# Patient Record
Sex: MALE | Race: BLACK OR AFRICAN AMERICAN | NOT HISPANIC OR LATINO | Employment: FULL TIME | URBAN - METROPOLITAN AREA
[De-identification: names, ages, dates, MRNs, and addresses within clinical notes are randomized per-mention and may not be internally consistent; named-entity substitution may affect disease eponyms.]

---

## 2020-02-11 ENCOUNTER — HOSPITAL ENCOUNTER (EMERGENCY)
Facility: HOSPITAL | Age: 40
Discharge: HOME | End: 2020-02-11
Attending: EMERGENCY MEDICINE | Admitting: EMERGENCY MEDICINE

## 2020-02-11 VITALS
DIASTOLIC BLOOD PRESSURE: 78 MMHG | HEIGHT: 68 IN | RESPIRATION RATE: 16 BRPM | TEMPERATURE: 98.7 F | HEART RATE: 100 BPM | OXYGEN SATURATION: 98 % | BODY MASS INDEX: 45.77 KG/M2 | WEIGHT: 302 LBS | SYSTOLIC BLOOD PRESSURE: 151 MMHG

## 2020-02-11 DIAGNOSIS — B02.9 HERPES ZOSTER WITHOUT COMPLICATION: Primary | ICD-10-CM

## 2020-02-11 PROCEDURE — 99282 EMERGENCY DEPT VISIT SF MDM: CPT

## 2020-02-11 RX ORDER — FAMCICLOVIR 500 MG/1
500 TABLET ORAL 3 TIMES DAILY
Qty: 21 TABLET | Refills: 0 | Status: SHIPPED | OUTPATIENT
Start: 2020-02-11 | End: 2021-01-05

## 2020-02-11 RX ORDER — PREDNISONE 20 MG/1
TABLET ORAL
Qty: 10 TABLET | Refills: 0 | Status: SHIPPED | OUTPATIENT
Start: 2020-02-11 | End: 2021-01-05

## 2020-02-11 RX ORDER — GABAPENTIN 300 MG/1
300 CAPSULE ORAL 3 TIMES DAILY
Qty: 30 CAPSULE | Refills: 0 | Status: SHIPPED | OUTPATIENT
Start: 2020-02-11 | End: 2021-01-05

## 2020-02-12 NOTE — ED PROVIDER NOTES
Subjective   Jose Hardin is a 39 y.o. male who presents to the ED with complaints of a rash for the past 2 days. He states that he noticed the rash on his left upper leg 2 days ago and it then spread up his side to his left flank and back. Mr. Hardin reports that a couple days before the rash appeared he had some leg and back pain in the same area as the rash. He states that movement makes the pain worse. He describes the rash as red, blistering, and painful. He describes the pain as a stinging sensation. He denies any associated fever, shortness of breath, or headache. Mr. Hardin denies any significant past medical history. He does not have a PCP. There are no other acute complaints at this time.      History provided by:  Patient  Rash   Location:  Leg and torso  Torso rash location:  Lower back  Leg rash location:  L upper leg  Quality: blistering, painful and redness    Pain details:     Quality:  Stinging    Severity:  Moderate    Onset quality:  Gradual    Duration:  2 days    Timing:  Constant    Progression:  Worsening  Severity:  Moderate  Onset quality:  Gradual  Duration:  2 days  Timing:  Constant  Progression:  Spreading  Chronicity:  New  Relieved by:  None tried  Worsened by:  Nothing  Ineffective treatments:  None tried  Associated symptoms: myalgias (left upper leg)    Associated symptoms: no fever, no headaches and no shortness of breath        Review of Systems   Constitutional: Negative for fever.   Respiratory: Negative for shortness of breath.    Musculoskeletal: Positive for back pain (low) and myalgias (left upper leg).   Skin: Positive for rash.   Neurological: Negative for headaches.   All other systems reviewed and are negative.      No past medical history on file.    No Known Allergies    No past surgical history on file.    No family history on file.    Social History     Socioeconomic History   • Marital status: Single     Spouse name: Not on file   • Number of  children: Not on file   • Years of education: Not on file   • Highest education level: Not on file         Objective   Physical Exam   Constitutional: He is oriented to person, place, and time. He appears well-developed and well-nourished.   HENT:   Head: Normocephalic and atraumatic.   Nose: Nose normal.   Eyes: Conjunctivae are normal. No scleral icterus.   Neck: Normal range of motion.   Pulmonary/Chest: Effort normal. No respiratory distress.   Musculoskeletal: Normal range of motion. He exhibits no deformity.   Neurological: He is alert and oriented to person, place, and time.   Skin: Skin is warm and dry. Rash noted.   There is a blistering red rash that almost completely fills the dermatome over his left thigh extending at the midline over his upper buttock. L3-L4 dermatome.   Psychiatric: He has a normal mood and affect. His behavior is normal.   Nursing note and vitals reviewed.      Procedures         ED Course  ED Course as of Feb 12 0152   Tue Feb 11, 2020 2012 I suspect the patient has shingles. I informed Mr. Hardin on treatment options and provided him discharge and follow up instructions. -DBT    [NP]   2015 NIKI query complete. Treatment plan to include limited course of prescribed  controlled substance. Risks including addiction, benefits, and alternatives presented to patient.     Reference: [id number]     [NP]   2016 I spoke with Mr. Garland about diagnosis.  I spoke with him about the possibility this could indicate immunocompromise possibly from HIV and advised him that he must find a primary care provider for further evaluation and treatment of this.  I talked to him about use of gabapentin.  I spoke with him about precautions such as avoiding exposure to pregnant or immunocompromised people.  He works from home so should be able to continue working    [DT]      ED Course User Index  [DT] Tae Aponte MD  [NP] Fatou Garcia     No results found for this or any previous visit  "(from the past 24 hour(s)).  Note: In addition to lab results from this visit, the labs listed above may include labs taken at another facility or during a different encounter within the last 24 hours. Please correlate lab times with ED admission and discharge times for further clarification of the services performed during this visit.    No orders to display     Vitals:    02/11/20 1716   BP: 151/78   BP Location: Left arm   Patient Position: Sitting   Pulse: 100   Resp: 16   Temp: 98.7 °F (37.1 °C)   TempSrc: Oral   SpO2: 98%   Weight: (!) 137 kg (302 lb)   Height: 172.7 cm (68\")     Medications - No data to display  ECG/EMG Results (last 24 hours)     ** No results found for the last 24 hours. **        No orders to display                       [place] Coma Scale Score: 15                            MDM    Final diagnoses:   Herpes zoster without complication       Documentation assistance provided by mike Garcia.  Information recorded by the scribe was done at my direction and has been verified and validated by me.     Fatou Garcia  02/11/20 2019       Tae Aponte MD  02/12/20 0152    "

## 2020-02-12 NOTE — DISCHARGE INSTRUCTIONS
Do not drive when you begin taking the gabapentin as it may make you sleepy.  Choose a primary care provider and arrange close follow-up with them for further evaluation.  Return if you have severe headache, shortness of breath, or any other concerns.  You may try using Zostrix which is available over-the-counter.  Monitor your blood pressure and report it to your primary care provider upon follow-up.

## 2021-01-05 ENCOUNTER — OFFICE VISIT (OUTPATIENT)
Dept: FAMILY MEDICINE CLINIC | Facility: CLINIC | Age: 41
End: 2021-01-05

## 2021-01-05 ENCOUNTER — LAB (OUTPATIENT)
Dept: LAB | Facility: HOSPITAL | Age: 41
End: 2021-01-05

## 2021-01-05 VITALS
HEIGHT: 68 IN | WEIGHT: 315 LBS | OXYGEN SATURATION: 99 % | SYSTOLIC BLOOD PRESSURE: 126 MMHG | HEART RATE: 80 BPM | BODY MASS INDEX: 47.74 KG/M2 | DIASTOLIC BLOOD PRESSURE: 84 MMHG

## 2021-01-05 DIAGNOSIS — Z00.00 PREVENTATIVE HEALTH CARE: ICD-10-CM

## 2021-01-05 DIAGNOSIS — G47.419 PRIMARY NARCOLEPSY WITHOUT CATAPLEXY: ICD-10-CM

## 2021-01-05 DIAGNOSIS — Z00.00 PREVENTATIVE HEALTH CARE: Primary | ICD-10-CM

## 2021-01-05 DIAGNOSIS — G47.33 OBSTRUCTIVE SLEEP APNEA: ICD-10-CM

## 2021-01-05 DIAGNOSIS — G40.909 SEIZURE DISORDER (HCC): ICD-10-CM

## 2021-01-05 LAB
BILIRUB UR QL STRIP: ABNORMAL
CLARITY UR: CLEAR
COLOR UR: ABNORMAL
GLUCOSE UR STRIP-MCNC: NEGATIVE MG/DL
HGB UR QL STRIP.AUTO: NEGATIVE
KETONES UR QL STRIP: ABNORMAL
LEUKOCYTE ESTERASE UR QL STRIP.AUTO: ABNORMAL
NITRITE UR QL STRIP: NEGATIVE
PH UR STRIP.AUTO: 7.5 [PH] (ref 5–8)
PROT UR QL STRIP: NEGATIVE
SP GR UR STRIP: 1.02 (ref 1–1.03)
UROBILINOGEN UR QL STRIP: ABNORMAL

## 2021-01-05 PROCEDURE — 99385 PREV VISIT NEW AGE 18-39: CPT | Performed by: INTERNAL MEDICINE

## 2021-01-05 PROCEDURE — 81001 URINALYSIS AUTO W/SCOPE: CPT

## 2021-01-05 PROCEDURE — 90686 IIV4 VACC NO PRSV 0.5 ML IM: CPT | Performed by: INTERNAL MEDICINE

## 2021-01-05 PROCEDURE — 80053 COMPREHEN METABOLIC PANEL: CPT

## 2021-01-05 PROCEDURE — 84439 ASSAY OF FREE THYROXINE: CPT

## 2021-01-05 PROCEDURE — 82306 VITAMIN D 25 HYDROXY: CPT

## 2021-01-05 PROCEDURE — 85025 COMPLETE CBC W/AUTO DIFF WBC: CPT

## 2021-01-05 PROCEDURE — 90471 IMMUNIZATION ADMIN: CPT | Performed by: INTERNAL MEDICINE

## 2021-01-05 PROCEDURE — 83036 HEMOGLOBIN GLYCOSYLATED A1C: CPT

## 2021-01-05 PROCEDURE — 80061 LIPID PANEL: CPT

## 2021-01-05 PROCEDURE — 84443 ASSAY THYROID STIM HORMONE: CPT

## 2021-01-05 NOTE — PROGRESS NOTES
Chief Complaint   Patient presents with   • Annual Exam     pt states he has had seizures on and off for 2 years but has been having one once every 2 weeks for the past 3 months   • Establish Care     Flu shot in office today.        HPI:  Jose Hardin is a 40 y.o. male who presents today for establish care and annual physical.  His main concern today is worsening seizure episodes.  He is seen several neurologists in the [place] area.  He has altered mental status states that usually occur in the morning lasting for several minutes at a time.  Patient reports he zones out, mostly talking gibberish, then returns to his normal self a few minutes later.  Most episodes occur after waking from either a nap or in the morning.  He has had one episode that occurred during a lecture in the day.    ROS:  Constitutional: no fevers, night sweats or unexplained weight loss  Eyes: no vision changes  ENT: no runny nose, ear pain, sore throat  Cardio: no chest pain, palpitations  Pulm: no shortness of breath, wheezing, or cough  GI: no abdominal pain or changes in bowel movements  : no difficulty urinating  MSK: no difficulty ambulating, no joint pain  Neuro: no weakness, dizziness or headache  Psych: no trouble sleeping  Endo: no change in appetite      Past Medical History:   Diagnosis Date   • Anxiety    • Seizures (CMS/HCC)     having seizures once every two weeks x 2 years      Family History   Problem Relation Age of Onset   • Hypertension Mother    • Mental illness Mother    • Kidney disease Mother    • Hypertension Father    • Kidney disease Father    • Cancer Father    • Heart disease Paternal Grandfather       Social History     Socioeconomic History   • Marital status: Single     Spouse name: Not on file   • Number of children: Not on file   • Years of education: Not on file   • Highest education level: Not on file   Tobacco Use   • Smoking status: Never Smoker   • Smokeless tobacco: Never Used   Substance and  Sexual Activity   • Alcohol use: Yes     Frequency: 2-3 times a week     Drinks per session: 1 or 2     Binge frequency: Never   • Drug use: Never   • Sexual activity: Defer      No Known Allergies   Immunization History   Administered Date(s) Administered   • Flulaval/Fluarix/Fluzone Quad 01/05/2021   • Tdap 09/02/2020        PE:  Vitals:    01/05/21 0930   BP: 126/84   Pulse: 80   SpO2: 99%      Body mass index is 48.17 kg/m².    Gen Appearance: NAD  HEENT: Normocephalic, PERRLA, no thyromegaly, trache midline  Heart: RRR, normal S1 and S2, no murmur  Lungs: CTA b/l, no wheezing, no crackles  Abdomen: Soft, non-tender, non-distended, no guarding and BSx4  MSK: Moves all extremities well, normal gait, no peripheral edema  Pulses: Palpable and equal b/l  Lymph nodes: No palpable lymphadenopathy   Neuro: No focal deficits      No current outpatient medications on file.     No current facility-administered medications for this visit.         Diagnoses and all orders for this visit:    1. Preventative health care (Primary)  -     CBC & Differential; Future  -     Comprehensive Metabolic Panel; Future  -     Hemoglobin A1c; Future  -     Lipid Panel; Future  -     TSH+Free T4; Future  -     Urinalysis With Culture If Indicated - Urine, Clean Catch; Future  -     Vitamin D 25 Hydroxy; Future  Counseled on healthy weight, nutrition, physical activity, cancer screening, and immunizations.  Recommend checking screening blood work.  Discussed obesity.  Referring to neurology for second opinion as well as sleep medicine to rule out narcolepsy.    2. Seizure disorder (CMS/HCC)    3. Primary narcolepsy without cataplexy    4. Obstructive sleep apnea    Other orders  -     Fluarix Quad >6 Months ([id number])         Return in about 1 year (around 1/5/2022) for Annual.     Please note that portions of this document were completed with a voice recognition program. Efforts were made to edit the dictations, but occasionally words are  mis-transcribed.

## 2021-01-06 LAB
25(OH)D3 SERPL-MCNC: 32.8 NG/ML (ref 30–100)
ALBUMIN SERPL-MCNC: 4.5 G/DL (ref 3.5–5.2)
ALBUMIN/GLOB SERPL: 1.3 G/DL
ALP SERPL-CCNC: 41 U/L (ref 39–117)
ALT SERPL W P-5'-P-CCNC: 19 U/L (ref 1–41)
ANION GAP SERPL CALCULATED.3IONS-SCNC: 8.5 MMOL/L (ref 5–15)
AST SERPL-CCNC: 29 U/L (ref 1–40)
BACTERIA UR QL AUTO: ABNORMAL /HPF
BASOPHILS # BLD AUTO: 0.05 10*3/MM3 (ref 0–0.2)
BASOPHILS NFR BLD AUTO: 1.5 % (ref 0–1.5)
BILIRUB SERPL-MCNC: 0.9 MG/DL (ref 0–1.2)
BUN SERPL-MCNC: 9 MG/DL (ref 6–20)
BUN/CREAT SERPL: 11.5 (ref 7–25)
CALCIUM SPEC-SCNC: 9.5 MG/DL (ref 8.6–10.5)
CHLORIDE SERPL-SCNC: 100 MMOL/L (ref 98–107)
CHOLEST SERPL-MCNC: 166 MG/DL (ref 0–200)
CO2 SERPL-SCNC: 28.5 MMOL/L (ref 22–29)
CREAT SERPL-MCNC: 0.78 MG/DL (ref 0.76–1.27)
DEPRECATED RDW RBC AUTO: 39.6 FL (ref 37–54)
EOSINOPHIL # BLD AUTO: 0.08 10*3/MM3 (ref 0–0.4)
EOSINOPHIL NFR BLD AUTO: 2.4 % (ref 0.3–6.2)
ERYTHROCYTE [DISTWIDTH] IN BLOOD BY AUTOMATED COUNT: 12 % (ref 12.3–15.4)
GFR SERPL CREATININE-BSD FRML MDRD: 135 ML/MIN/1.73
GLOBULIN UR ELPH-MCNC: 3.5 GM/DL
GLUCOSE SERPL-MCNC: 88 MG/DL (ref 65–99)
HBA1C MFR BLD: 4.6 % (ref 4.8–5.6)
HCT VFR BLD AUTO: 42.9 % (ref 37.5–51)
HDLC SERPL-MCNC: 85 MG/DL (ref 40–60)
HGB BLD-MCNC: 14.4 G/DL (ref 13–17.7)
HYALINE CASTS UR QL AUTO: ABNORMAL /LPF
IMM GRANULOCYTES # BLD AUTO: 0 10*3/MM3 (ref 0–0.05)
IMM GRANULOCYTES NFR BLD AUTO: 0 % (ref 0–0.5)
LDLC SERPL CALC-MCNC: 72 MG/DL (ref 0–100)
LDLC/HDLC SERPL: 0.86 {RATIO}
LYMPHOCYTES # BLD AUTO: 1.48 10*3/MM3 (ref 0.7–3.1)
LYMPHOCYTES NFR BLD AUTO: 44.8 % (ref 19.6–45.3)
MCH RBC QN AUTO: 30.6 PG (ref 26.6–33)
MCHC RBC AUTO-ENTMCNC: 33.6 G/DL (ref 31.5–35.7)
MCV RBC AUTO: 91.3 FL (ref 79–97)
MONOCYTES # BLD AUTO: 0.41 10*3/MM3 (ref 0.1–0.9)
MONOCYTES NFR BLD AUTO: 12.4 % (ref 5–12)
NEUTROPHILS NFR BLD AUTO: 1.28 10*3/MM3 (ref 1.7–7)
NEUTROPHILS NFR BLD AUTO: 38.9 % (ref 42.7–76)
NRBC BLD AUTO-RTO: 0 /100 WBC (ref 0–0.2)
PLATELET # BLD AUTO: 253 10*3/MM3 (ref 140–450)
PMV BLD AUTO: 9.6 FL (ref 6–12)
POTASSIUM SERPL-SCNC: 4.2 MMOL/L (ref 3.5–5.2)
PROT SERPL-MCNC: 8 G/DL (ref 6–8.5)
RBC # BLD AUTO: 4.7 10*6/MM3 (ref 4.14–5.8)
RBC # UR: ABNORMAL /HPF
REF LAB TEST METHOD: ABNORMAL
SODIUM SERPL-SCNC: 137 MMOL/L (ref 136–145)
SQUAMOUS #/AREA URNS HPF: ABNORMAL /HPF
T4 FREE SERPL-MCNC: 1.23 NG/DL (ref 0.93–1.7)
TRIGL SERPL-MCNC: 38 MG/DL (ref 0–150)
TSH SERPL DL<=0.05 MIU/L-ACNC: 1.64 UIU/ML (ref 0.27–4.2)
VLDLC SERPL-MCNC: 9 MG/DL (ref 5–40)
WBC # BLD AUTO: 3.3 10*3/MM3 (ref 3.4–10.8)
WBC UR QL AUTO: ABNORMAL /HPF

## 2021-01-26 DIAGNOSIS — Z11.3 SCREENING EXAMINATION FOR STD (SEXUALLY TRANSMITTED DISEASE): ICD-10-CM

## 2021-01-26 DIAGNOSIS — Z20.2 TRICHOMONAS EXPOSURE: Primary | ICD-10-CM

## 2021-01-26 RX ORDER — METRONIDAZOLE 500 MG/1
500 TABLET ORAL 2 TIMES DAILY
Qty: 14 TABLET | Refills: 0 | Status: SHIPPED | OUTPATIENT
Start: 2021-01-26 | End: 2021-02-02

## 2021-02-01 DIAGNOSIS — Z11.3 SCREENING EXAMINATION FOR STD (SEXUALLY TRANSMITTED DISEASE): ICD-10-CM

## 2021-12-09 ENCOUNTER — OFFICE VISIT (OUTPATIENT)
Dept: FAMILY MEDICINE CLINIC | Facility: CLINIC | Age: 41
End: 2021-12-09

## 2021-12-09 ENCOUNTER — HOSPITAL ENCOUNTER (OUTPATIENT)
Dept: GENERAL RADIOLOGY | Facility: HOSPITAL | Age: 41
Discharge: HOME | End: 2021-12-09
Admitting: INTERNAL MEDICINE

## 2021-12-09 VITALS
SYSTOLIC BLOOD PRESSURE: 120 MMHG | DIASTOLIC BLOOD PRESSURE: 78 MMHG | WEIGHT: 315 LBS | HEIGHT: 68 IN | BODY MASS INDEX: 47.74 KG/M2 | OXYGEN SATURATION: 97 % | HEART RATE: 87 BPM

## 2021-12-09 DIAGNOSIS — M25.562 CHRONIC PAIN OF LEFT KNEE: Primary | ICD-10-CM

## 2021-12-09 DIAGNOSIS — E66.01 CLASS 3 SEVERE OBESITY DUE TO EXCESS CALORIES WITHOUT SERIOUS COMORBIDITY WITH BODY MASS INDEX (BMI) OF 45.0 TO 49.9 IN ADULT (HCC): ICD-10-CM

## 2021-12-09 DIAGNOSIS — M25.562 CHRONIC PAIN OF LEFT KNEE: ICD-10-CM

## 2021-12-09 DIAGNOSIS — G89.29 CHRONIC PAIN OF LEFT KNEE: ICD-10-CM

## 2021-12-09 DIAGNOSIS — G89.29 CHRONIC PAIN OF LEFT KNEE: Primary | ICD-10-CM

## 2021-12-09 PROCEDURE — 73560 X-RAY EXAM OF KNEE 1 OR 2: CPT

## 2021-12-09 PROCEDURE — 99214 OFFICE O/P EST MOD 30 MIN: CPT | Performed by: INTERNAL MEDICINE

## 2021-12-09 RX ORDER — OXCARBAZEPINE 300 MG
1200 TABLET ORAL 2 TIMES DAILY
COMMUNITY
Start: 2021-10-17 | End: 2023-01-10

## 2021-12-10 PROBLEM — G89.29 CHRONIC PAIN OF LEFT KNEE: Status: ACTIVE | Noted: 2021-12-10

## 2021-12-10 PROBLEM — E66.813 CLASS 3 SEVERE OBESITY DUE TO EXCESS CALORIES WITHOUT SERIOUS COMORBIDITY WITH BODY MASS INDEX (BMI) OF 45.0 TO 49.9 IN ADULT: Status: ACTIVE | Noted: 2021-12-10

## 2021-12-10 PROBLEM — E66.01 CLASS 3 SEVERE OBESITY DUE TO EXCESS CALORIES WITHOUT SERIOUS COMORBIDITY WITH BODY MASS INDEX (BMI) OF 45.0 TO 49.9 IN ADULT (HCC): Status: ACTIVE | Noted: 2021-12-10

## 2021-12-10 PROBLEM — M25.562 CHRONIC PAIN OF LEFT KNEE: Status: ACTIVE | Noted: 2021-12-10

## 2021-12-10 NOTE — PROGRESS NOTES
Chief Complaint   Patient presents with   • Knee Pain     left side. x 3 months and worsening - no injury noted. hx of football injuries.        HPI:  Jose Hardin is a 41 y.o. male who presents today for acute on chronic left knee pain.  Worsening over the last 3 months.  No identifiable trauma or injury.  He has a history of several injuries while playing football.    ROS:  Constitutional: no fevers, night sweats or unexplained weight loss  Eyes: no vision changes  ENT: no runny nose, ear pain, sore throat  Cardio: no chest pain, palpitations  Pulm: no shortness of breath, wheezing, or cough  GI: no abdominal pain or changes in bowel movements  : no difficulty urinating  MSK: no difficulty ambulating, + joint pain  Neuro: no weakness, dizziness or headache  Psych: no trouble sleeping  Endo: no change in appetite      Past Medical History:   Diagnosis Date   • Anxiety    • Seizures (HCC)     having seizures once every two weeks x 2 years      Family History   Problem Relation Age of Onset   • Hypertension Mother    • Mental illness Mother    • Kidney disease Mother    • Hypertension Father    • Kidney disease Father    • Cancer Father    • Heart disease Paternal Grandfather       Social History     Socioeconomic History   • Marital status: Single   Tobacco Use   • Smoking status: Never Smoker   • Smokeless tobacco: Never Used   Substance and Sexual Activity   • Alcohol use: Yes   • Drug use: Never   • Sexual activity: Defer      No Known Allergies   Immunization History   Administered Date(s) Administered   • COVID-19 (PFIZER) 03/25/2021, 04/16/2021   • FluLaval/Fluarix/Fluzone >6 01/05/2021   • Tdap 09/02/2020        PE:  Vitals:    12/09/21 1151   BP: 120/78   Pulse: 87   SpO2: 97%      Body mass index is 48.35 kg/m².    Gen Appearance: NAD  HEENT: Normocephalic, PERRLA, no thyromegaly, trache midline  Heart: RRR, normal S1 and S2, no murmur  Lungs: CTA b/l, no wheezing, no crackles  Abdomen: Soft,  non-tender, non-distended, no guarding and BSx4  MSK: Moves all extremities well, normal gait, no peripheral edema  Pulses: Palpable and equal b/l  Lymph nodes: No palpable lymphadenopathy   Neuro: No focal deficits      Current Outpatient Medications   Medication Sig Dispense Refill   • OXcarbazepine (Trileptal) 300 MG tablet      • Diclofenac Sodium (VOLTAREN) 1 % gel gel Apply 4 g topically to the appropriate area as directed 4 (Four) Times a Day As Needed (knee pain). 100 g 1     No current facility-administered medications for this visit.        Diagnoses and all orders for this visit:    1. Chronic pain of left knee (Primary)  -     XR Knee 1 or 2 View Left; Future  -     Diclofenac Sodium (VOLTAREN) 1 % gel gel; Apply 4 g topically to the appropriate area as directed 4 (Four) Times a Day As Needed (knee pain).  Dispense: 100 g; Refill: 1  -     Ambulatory Referral to Orthopedic Surgery  Medial knee pain on the left.  Voltaren gel as needed.  Recommend establishing care with orthopedics.  Checking x-ray today.  2. Class 3 severe obesity due to excess calories without serious comorbidity with body mass index (BMI) of 45.0 to 49.9 in adult (HCC)  Encouraged weight loss.       No follow-ups on file.     Dictated Utilizing Dragon Dictation    Please note that portions of this note were completed with a voice recognition program.    Part of this note may be an electronic transcription/translation of spoken language to printed text using the Dragon Dictation System.

## 2021-12-23 ENCOUNTER — OFFICE VISIT (OUTPATIENT)
Dept: ORTHOPEDIC SURGERY | Facility: CLINIC | Age: 41
End: 2021-12-23

## 2021-12-23 VITALS
SYSTOLIC BLOOD PRESSURE: 132 MMHG | HEIGHT: 68 IN | DIASTOLIC BLOOD PRESSURE: 92 MMHG | WEIGHT: 315 LBS | BODY MASS INDEX: 47.74 KG/M2

## 2021-12-23 DIAGNOSIS — M25.562 CHRONIC PAIN OF LEFT KNEE: Primary | ICD-10-CM

## 2021-12-23 DIAGNOSIS — S84.12XS INJURY OF LEFT PERONEAL NERVE, SEQUELA: ICD-10-CM

## 2021-12-23 DIAGNOSIS — M17.12 PRIMARY OSTEOARTHRITIS OF LEFT KNEE: ICD-10-CM

## 2021-12-23 DIAGNOSIS — E66.01 CLASS 3 SEVERE OBESITY DUE TO EXCESS CALORIES WITHOUT SERIOUS COMORBIDITY WITH BODY MASS INDEX (BMI) OF 45.0 TO 49.9 IN ADULT (HCC): ICD-10-CM

## 2021-12-23 DIAGNOSIS — G89.29 CHRONIC PAIN OF LEFT KNEE: Primary | ICD-10-CM

## 2021-12-23 PROCEDURE — 99204 OFFICE O/P NEW MOD 45 MIN: CPT | Performed by: ORTHOPAEDIC SURGERY

## 2021-12-23 NOTE — PROGRESS NOTES
[facility] [place] [facility] Note        Subjective     Pain of the Left Knee      HPI    Jose Hardin is a 41 y.o. male who presents with new problem of: left knee pain.  Onset: atraumatic and gradual in nature. The issue has been ongoing for 3 month(s). Pain is a 7/10 on the pain scale. Pain is described as aching and throbbing. Associated symptoms include pain, swelling and giving way/buckling. The pain is worse with walking, standing, sitting, climbing stairs, sleeping, working and leisure; pain medication and/or NSAID improve the pain. Previous treatments have included: bracing and weight loss.    I have reviewed the following portions of the patient's history:History of Present Illness and review of systems.         Patient is here today at the request of Dr. Coates for evaluation of 3 months of worsening left knee pain.  Patient had a dog bite posterior laterally knee was initially concerned about infection.  He has pain anterior medially and occasional numbness and tingling in his foot..  Patient also has family history of arthritis in the knee.    Past Medical History:   Diagnosis Date   • Acromioclavicular separation Roughly 20 years ago    High school football injury   • Anxiety    • Knee sprain Over 20 years ago    Old injury   • Knee swelling Early Dec.    Recent X-Ray for this visit   • Seizures (HCC)     having seizures once every two weeks x 2 years      History reviewed. No pertinent surgical history.   Family History   Problem Relation Age of Onset   • Hypertension Mother    • Mental illness Mother    • Kidney disease Mother    • Hypertension Father    • Kidney disease Father    • Cancer Father    • Heart disease Paternal Grandfather      Social History     Socioeconomic History   • Marital status: Single   Tobacco Use   • Smoking status: Never Smoker   • Smokeless tobacco: Never Used   Substance and Sexual Activity   • Alcohol use: Yes     Alcohol/week: 4.0 standard drinks     Types:  "2 Cans of beer, 2 Standard drinks or equivalent per week   • Drug use: Never   • Sexual activity: Yes     Partners: Female     Birth control/protection: None      Current Outpatient Medications on File Prior to Visit   Medication Sig Dispense Refill   • Diclofenac Sodium (VOLTAREN) 1 % gel gel Apply 4 g topically to the appropriate area as directed 4 (Four) Times a Day As Needed (knee pain). 100 g 1   • OXcarbazepine (Trileptal) 300 MG tablet        No current facility-administered medications on file prior to visit.      No Known Allergies       Review of Systems   Constitutional: Negative.    HENT: Negative.    Eyes: Negative.    Respiratory: Negative.    Cardiovascular: Negative.    Gastrointestinal: Negative.    Endocrine: Negative.    Genitourinary: Negative.    Musculoskeletal: Positive for arthralgias.   Skin: Negative.    Allergic/Immunologic: Negative.    Neurological: Negative.    Hematological: Negative.    Psychiatric/Behavioral: Negative.         I reviewed the patient's chief complaint, history of present illness, review of systems, past medical history, surgical history, family history, social history, medications and allergy list.        Objective      Physical Exam  /92   Ht 172.7 cm (67.99\")   Wt (!) 144 kg (317 lb 7.4 oz)   BMI 48.28 kg/m²     Body mass index is 48.28 kg/m².    General  Mental Status - alert  General Appearance - cooperative, well groomed, not in acute distress  Orientation - Oriented X3  Build & Nutrition - well developed and well nourished  Posture - normal posture  Gait - normal gait       Ortho Exam  Peripheral Vascular:    Upper Extremity:   Inspection:  Left--no cyanotic nail beds Right--no cyanotic nail beds   Bilateral:  Pink nail beds with brisk capillary refill   Palpation:  Bilateral radial pulse normal    Musculoskeletal:  Global Assessment:  Overall assessment of Lower Extremity Muscle Strength and Tone:  Left quadriceps--5/5   Left hamstrings--5/5       Left " tibialis anterior--5/5  Left gastroc-soleus--5/5  Left EHL --5/5    Lower Extremity:  Knee/Patella:  No digital clubbing or cyanosis.    Examination of left knee reveals:  Normal deep tendon reflexes, coordination, strength, tone, sensation.  No known fractures or deformities.    Inspection and Palpation:  Left knee:  Tenderness:  Over the lateral joint line and moderate severity  Effusion:  1+  Crepitus:  Positive  Pulses:  2+  Ecchymosis:  None  Warmth:  None     ROM:  Left:  Extension:5    Flexion:120  Right:  Extension:5     Flexion:120    Instability:    Left:  Lachman Test:  Negative, Varus stress test negative, Valgus stress test negative    Deformities/Malalignments/Discrepancies:    Left:  Genu valgum    Functional Testing:  Kaleb's test:  Negative  Patella grind test:  Positive  Q-angle:  normal    Imaging/Studies  Imaging Results (Last 24 Hours)     ** No results found for the last 24 hours. **      XR Knee 1 or 2 View Left  Narrative: EXAMINATION: XR KNEE 1 OR 2 VW LEFT-      INDICATION: M25.562-Pain in left knee; G89.29-Other chronic pain.      COMPARISON: None.     FINDINGS: AP and lateral views of the left knee reveal at least moderate  DJD with joint space narrowing in the medial femorotibial compartments  and a slight tibia varus configuration of alignment with osteophytosis  and flattening of the lateral tibial plateau although no depressed or  displaced element for acute fracture is evident. No effusion.         Impression: Moderate DJD with joint space narrowing in the medial  femorotibial compartments and a slight tibia varus configuration of  alignment with osteophytosis and flattening of the lateral tibial  plateau although no depressed or displaced element for acute fracture is  evident.     D:  12/10/2021  E:  12/10/2021     This report was finalized on 12/10/2021 3:58 PM by Dr. Arnel Lomeli.     We reviewed images and report of the x-ray above.  Patient has moderate to early severe  lateral compartment degenerative changes.  More moderate patellofemoral degenerative changes.  Mild genu valgum.      Assessment    Assessment:  1. Chronic pain of left knee    2. Primary osteoarthritis of left knee    3. Class 3 severe obesity due to excess calories without serious comorbidity with body mass index (BMI) of 45.0 to 49.9 in adult (HCC)    4. Injury of left peroneal nerve, sequela        Plan:  1. Continue over-the-counter medication as needed for discomfort  2. Chronic left knee pain in a patient with lateral and patellofemoral compartment arthritis.  Patient is status post dog bite to the posterior lateral aspect of his knee which could have injured the peroneal nerve and cause some of the tingling in his foot from time to time.  The numbness and tingling could also be coming from his low back.  We will keep an eye on things.  He says he is currently okay with regards to treatment and we briefly discussed the possibility of injections down the road and weight control.  Check him back in 3 months we will see how he is doing overall.        Maikel Lam MD  12/23/21  12:40 EST      Dictated Utilizing Dragon Dictation.

## 2022-01-10 ENCOUNTER — TELEPHONE (OUTPATIENT)
Dept: ORTHOPEDIC SURGERY | Facility: CLINIC | Age: 42
End: 2022-01-10

## 2022-01-10 DIAGNOSIS — M17.12 PRIMARY OSTEOARTHRITIS OF LEFT KNEE: Primary | ICD-10-CM

## 2022-01-10 NOTE — TELEPHONE ENCOUNTER
----- Message from Jose Hardin sent at 1/8/2022 11:17 AM EST -----  Regarding: Knee/Leg Pain  Hi Dr. Hamilton,    Hope you're well. Since my initial visit with you my knee and leg (especially inner shin area) pain has been very severe where I can barely walk. I've been taking natural supplements along with Tylenol Arthritis, they're no longer doing the trick. I'm scheduled for a follow-up visit in March, but I was wondering if I can possibly receive a script for a steroid pill and if that would help the pain?     I had a colleague mention he had gout (sp) and it had similar symptoms and they received a steroid pill and that helped a great deal. Maybe I can also be scheduled for a follow-up visit prior to March for an injection, but I was hoping you can call in a steroid script in the meantime for some more immediate relief? Please let me know.    Thanks,    Will Wilfred

## 2022-01-10 NOTE — TELEPHONE ENCOUNTER
From Myrna:  I have placed a preauthorization for viscosupplementation series to the knee.  Once approved, he can follow-up with Dr. Lam to begin the series.     Sent message to patient via [employer].    Shanel Olson

## 2022-01-11 ENCOUNTER — OFFICE VISIT (OUTPATIENT)
Dept: ORTHOPEDIC SURGERY | Facility: CLINIC | Age: 42
End: 2022-01-11

## 2022-01-11 VITALS
BODY MASS INDEX: 47.74 KG/M2 | HEIGHT: 68 IN | WEIGHT: 315 LBS | DIASTOLIC BLOOD PRESSURE: 98 MMHG | SYSTOLIC BLOOD PRESSURE: 132 MMHG

## 2022-01-11 DIAGNOSIS — E66.01 CLASS 3 SEVERE OBESITY DUE TO EXCESS CALORIES WITHOUT SERIOUS COMORBIDITY WITH BODY MASS INDEX (BMI) OF 45.0 TO 49.9 IN ADULT: ICD-10-CM

## 2022-01-11 DIAGNOSIS — M17.12 PRIMARY OSTEOARTHRITIS OF LEFT KNEE: Primary | ICD-10-CM

## 2022-01-11 PROCEDURE — 99213 OFFICE O/P EST LOW 20 MIN: CPT | Performed by: PHYSICIAN ASSISTANT

## 2022-01-11 PROCEDURE — 20610 DRAIN/INJ JOINT/BURSA W/O US: CPT | Performed by: PHYSICIAN ASSISTANT

## 2022-01-11 RX ORDER — LIDOCAINE HYDROCHLORIDE 10 MG/ML
4 INJECTION, SOLUTION EPIDURAL; INFILTRATION; INTRACAUDAL; PERINEURAL
Status: COMPLETED | OUTPATIENT
Start: 2022-01-11 | End: 2022-01-11

## 2022-01-11 RX ORDER — NAPROXEN 500 MG/1
500 TABLET ORAL 2 TIMES DAILY
Qty: 60 TABLET | Refills: 2 | Status: SHIPPED | OUTPATIENT
Start: 2022-01-11 | End: 2022-03-22

## 2022-01-11 RX ORDER — TRIAMCINOLONE ACETONIDE 40 MG/ML
40 INJECTION, SUSPENSION INTRA-ARTICULAR; INTRAMUSCULAR
Status: COMPLETED | OUTPATIENT
Start: 2022-01-11 | End: 2022-01-11

## 2022-01-11 RX ADMIN — LIDOCAINE HYDROCHLORIDE 4 ML: 10 INJECTION, SOLUTION EPIDURAL; INFILTRATION; INTRACAUDAL; PERINEURAL at 13:29

## 2022-01-11 RX ADMIN — TRIAMCINOLONE ACETONIDE 40 MG: 40 INJECTION, SUSPENSION INTRA-ARTICULAR; INTRAMUSCULAR at 13:29

## 2022-01-11 NOTE — PROGRESS NOTES
Procedure   Large Joint Arthrocentesis: L knee  Date/Time: 1/11/2022 1:29 PM  Consent given by: patient  Site marked: site marked  Timeout: Immediately prior to procedure a time out was called to verify the correct patient, procedure, equipment, support staff and site/side marked as required   Supporting Documentation  Indications: pain   Procedure Details  Location: knee - L knee  Preparation: Patient was prepped and draped in the usual sterile fashion  Needle size: 22 G  Approach: anterolateral  Medications administered: 4 mL lidocaine PF 1% 1 %; 40 mg triamcinolone acetonide 40 MG/ML  Patient tolerance: patient tolerated the procedure well with no immediate complications

## 2022-01-11 NOTE — PROGRESS NOTES
"    [facility] [place] [facility] Note        Subjective     CC: Follow-up (2 weeks follow up Chronic pain of left knee )      HPI    Jose Hardin is a 41 y.o. male.  Patient was last seen by Dr. Lam 12/23/2021.  At that time his left knee was okay but since then he has noted increased pain to his knee.  He had contacted the clinic yesterday and a preauthorization was placed for viscosupplementation series.  Then he recontact the clinic again this morning asking for assistance regarding the pain that he is having.  He was brought in for a one-time corticosteroid injection to the left knee.    Overall, patient's symptoms are worsening.    ROS:    Constiutional:Pt denies fever, chills, nausea, or vomiting.  MSK:as above        Objective      Past Medical History  Past Medical History:   Diagnosis Date   • Acromioclavicular separation Roughly 20 years ago    High school football injury   • Anxiety    • Knee sprain Over 20 years ago    Old injury   • Knee swelling Early Dec.    Recent X-Ray for this visit   • Seizures ([facility])     having seizures once every two weeks x 2 years         Physical Exam  /98   Ht 172.7 cm (67.99\")   Wt (!) 144 kg (317 lb 7.4 oz)   BMI 48.28 kg/m²     Body mass index is 48.28 kg/m².    Patient is well nourished and well developed.        Ortho Exam  Left knee  Skin: Grossly intact without any redness or warmth.  Trace effusion noted.  Motor/sensory: Grossly intact L2-S1.      Imaging/Labs/EMG Reviewed:  No new imaging today.      Assessment:  1. Primary osteoarthritis of left knee    2. Class 3 severe obesity due to excess calories without serious comorbidity with body mass index (BMI) of 45.0 to 49.9 in adult ([facility])        Plan:  Left knee osteoarthritis--offered and accepted a one-time corticosteroid injection to his left knee.  Injection was given today.  Earlier today prescribed naproxen.  He will pick that up this afternoon.  Obesity--patient understands with his " BMI of 48.28 he needs to keep working on portion and diet control along with low to nonimpact exercise to help assist with weight loss.  If it anytime he wants a referral to weight management or bariatric surgery he just needs to contact the clinic and I will be happy to place that referral.  Yesterday I did place preauthorization for viscosupplementation series.  If it is approved in our clinic contact him to get those started he needs to wait until 2/15/2022 to start those injections.  If not improved we did discuss self-pay option.  For now until we know if he has been approved for the viscosupplementation he will keep his follow-up appointment with Dr. Lam 3/24/2022.  Questions and concerns answered.    After discussing the risks, benefits, indications of injection, the patient gave consent to proceed.  His left knee was confirmed as the correct joint to be injected with a timeout.  It was then prepped using Hibiclens and injected with a mixture of 4 cc of 1% plain lidocaine and 1 cc of Kenalog (40 mg per mL), without any resistance through the anterior lateral approach, patient in seated position.  Area was cleaned, hemostasis was achieved and a Band-Aid was applied over the injection site.  The patient tolerated procedure well.  I instructed the patient on signs and symptoms of infection.  They should report to the emergency department or return to clinic if any of these develop, for further evaluation and treatment.  Recommended modifying activity for the next 48 hours to include rest, ice, elevation and oral pain medication as needed.        Myrna Manzano PA-C  01/11/22  14:54 EST      Dictated Utilizing Dragon Dictation.

## 2022-01-29 ENCOUNTER — APPOINTMENT (OUTPATIENT)
Dept: GENERAL RADIOLOGY | Facility: HOSPITAL | Age: 42
End: 2022-01-29

## 2022-01-29 ENCOUNTER — APPOINTMENT (OUTPATIENT)
Dept: CT IMAGING | Facility: HOSPITAL | Age: 42
End: 2022-01-29

## 2022-01-29 ENCOUNTER — HOSPITAL ENCOUNTER (OUTPATIENT)
Facility: HOSPITAL | Age: 42
Setting detail: OBSERVATION
Discharge: HOME | End: 2022-01-31
Attending: EMERGENCY MEDICINE | Admitting: INTERNAL MEDICINE

## 2022-01-29 DIAGNOSIS — J98.59 MEDIASTINAL MASS: Primary | ICD-10-CM

## 2022-01-29 DIAGNOSIS — R77.8 ELEVATED TROPONIN: ICD-10-CM

## 2022-01-29 DIAGNOSIS — R07.9 NONSPECIFIC CHEST PAIN: ICD-10-CM

## 2022-01-29 PROBLEM — Z87.898 HISTORY OF SEIZURES: Status: ACTIVE | Noted: 2022-01-29

## 2022-01-29 LAB
ALBUMIN SERPL-MCNC: 4.2 G/DL (ref 3.5–5.2)
ALBUMIN/GLOB SERPL: 1.3 G/DL
ALP SERPL-CCNC: 41 U/L (ref 39–117)
ALT SERPL W P-5'-P-CCNC: 23 U/L (ref 1–41)
ANION GAP SERPL CALCULATED.3IONS-SCNC: 9 MMOL/L (ref 5–15)
AST SERPL-CCNC: 31 U/L (ref 1–40)
BASOPHILS # BLD AUTO: 0.05 10*3/MM3 (ref 0–0.2)
BASOPHILS NFR BLD AUTO: 1 % (ref 0–1.5)
BILIRUB SERPL-MCNC: 0.4 MG/DL (ref 0–1.2)
BUN SERPL-MCNC: 10 MG/DL (ref 6–20)
BUN/CREAT SERPL: 12 (ref 7–25)
CALCIUM SPEC-SCNC: 9.1 MG/DL (ref 8.6–10.5)
CHLORIDE SERPL-SCNC: 97 MMOL/L (ref 98–107)
CO2 SERPL-SCNC: 27 MMOL/L (ref 22–29)
CREAT SERPL-MCNC: 0.83 MG/DL (ref 0.76–1.27)
CRP SERPL-MCNC: 11.43 MG/DL (ref 0–0.5)
DEPRECATED RDW RBC AUTO: 40.8 FL (ref 37–54)
EOSINOPHIL # BLD AUTO: 0.5 10*3/MM3 (ref 0–0.4)
EOSINOPHIL NFR BLD AUTO: 9.9 % (ref 0.3–6.2)
ERYTHROCYTE [DISTWIDTH] IN BLOOD BY AUTOMATED COUNT: 12.2 % (ref 12.3–15.4)
ERYTHROCYTE [SEDIMENTATION RATE] IN BLOOD: 63 MM/HR (ref 0–15)
FLUAV RNA RESP QL NAA+PROBE: NOT DETECTED
FLUBV RNA RESP QL NAA+PROBE: NOT DETECTED
GFR SERPL CREATININE-BSD FRML MDRD: 125 ML/MIN/1.73
GLOBULIN UR ELPH-MCNC: 3.2 GM/DL
GLUCOSE SERPL-MCNC: 90 MG/DL (ref 65–99)
HCT VFR BLD AUTO: 40.9 % (ref 37.5–51)
HGB BLD-MCNC: 14 G/DL (ref 13–17.7)
HOLD SPECIMEN: NORMAL
IMM GRANULOCYTES # BLD AUTO: 0.01 10*3/MM3 (ref 0–0.05)
IMM GRANULOCYTES NFR BLD AUTO: 0.2 % (ref 0–0.5)
LIPASE SERPL-CCNC: 51 U/L (ref 13–60)
LYMPHOCYTES # BLD AUTO: 1.06 10*3/MM3 (ref 0.7–3.1)
LYMPHOCYTES NFR BLD AUTO: 21 % (ref 19.6–45.3)
MCH RBC QN AUTO: 31.2 PG (ref 26.6–33)
MCHC RBC AUTO-ENTMCNC: 34.2 G/DL (ref 31.5–35.7)
MCV RBC AUTO: 91.1 FL (ref 79–97)
MONOCYTES # BLD AUTO: 0.9 10*3/MM3 (ref 0.1–0.9)
MONOCYTES NFR BLD AUTO: 17.9 % (ref 5–12)
NEUTROPHILS NFR BLD AUTO: 2.52 10*3/MM3 (ref 1.7–7)
NEUTROPHILS NFR BLD AUTO: 50 % (ref 42.7–76)
NRBC BLD AUTO-RTO: 0 /100 WBC (ref 0–0.2)
NT-PROBNP SERPL-MCNC: 14.4 PG/ML (ref 0–450)
PLATELET # BLD AUTO: 229 10*3/MM3 (ref 140–450)
PMV BLD AUTO: 9.6 FL (ref 6–12)
POTASSIUM SERPL-SCNC: 4.2 MMOL/L (ref 3.5–5.2)
PROT SERPL-MCNC: 7.4 G/DL (ref 6–8.5)
QT INTERVAL: 354 MS
QTC INTERVAL: 433 MS
RBC # BLD AUTO: 4.49 10*6/MM3 (ref 4.14–5.8)
SARS-COV-2 RNA RESP QL NAA+PROBE: NOT DETECTED
SODIUM SERPL-SCNC: 133 MMOL/L (ref 136–145)
TROPONIN T SERPL-MCNC: 0.04 NG/ML (ref 0–0.03)
TROPONIN T SERPL-MCNC: 0.04 NG/ML (ref 0–0.03)
TROPONIN T SERPL-MCNC: 0.05 NG/ML (ref 0–0.03)
WBC NRBC COR # BLD: 5.04 10*3/MM3 (ref 3.4–10.8)
WHOLE BLOOD HOLD SPECIMEN: NORMAL
WHOLE BLOOD HOLD SPECIMEN: NORMAL

## 2022-01-29 PROCEDURE — 0 IOPAMIDOL PER 1 ML: Performed by: EMERGENCY MEDICINE

## 2022-01-29 PROCEDURE — 25010000002 METHYLPREDNISOLONE PER 40 MG: Performed by: EMERGENCY MEDICINE

## 2022-01-29 PROCEDURE — 99220 PR INITIAL OBSERVATION CARE/DAY 70 MINUTES: CPT | Performed by: PHYSICIAN ASSISTANT

## 2022-01-29 PROCEDURE — 86140 C-REACTIVE PROTEIN: CPT | Performed by: EMERGENCY MEDICINE

## 2022-01-29 PROCEDURE — 87636 SARSCOV2 & INF A&B AMP PRB: CPT | Performed by: EMERGENCY MEDICINE

## 2022-01-29 PROCEDURE — 83690 ASSAY OF LIPASE: CPT | Performed by: EMERGENCY MEDICINE

## 2022-01-29 PROCEDURE — C9803 HOPD COVID-19 SPEC COLLECT: HCPCS

## 2022-01-29 PROCEDURE — G0378 HOSPITAL OBSERVATION PER HR: HCPCS

## 2022-01-29 PROCEDURE — 85652 RBC SED RATE AUTOMATED: CPT | Performed by: EMERGENCY MEDICINE

## 2022-01-29 PROCEDURE — 80053 COMPREHEN METABOLIC PANEL: CPT | Performed by: EMERGENCY MEDICINE

## 2022-01-29 PROCEDURE — 96375 TX/PRO/DX INJ NEW DRUG ADDON: CPT

## 2022-01-29 PROCEDURE — 71045 X-RAY EXAM CHEST 1 VIEW: CPT

## 2022-01-29 PROCEDURE — 84484 ASSAY OF TROPONIN QUANT: CPT | Performed by: PHYSICIAN ASSISTANT

## 2022-01-29 PROCEDURE — 93005 ELECTROCARDIOGRAM TRACING: CPT

## 2022-01-29 PROCEDURE — 71275 CT ANGIOGRAPHY CHEST: CPT

## 2022-01-29 PROCEDURE — 84484 ASSAY OF TROPONIN QUANT: CPT | Performed by: EMERGENCY MEDICINE

## 2022-01-29 PROCEDURE — 96374 THER/PROPH/DIAG INJ IV PUSH: CPT

## 2022-01-29 PROCEDURE — 85025 COMPLETE CBC W/AUTO DIFF WBC: CPT | Performed by: EMERGENCY MEDICINE

## 2022-01-29 PROCEDURE — 83880 ASSAY OF NATRIURETIC PEPTIDE: CPT | Performed by: EMERGENCY MEDICINE

## 2022-01-29 PROCEDURE — 93005 ELECTROCARDIOGRAM TRACING: CPT | Performed by: EMERGENCY MEDICINE

## 2022-01-29 PROCEDURE — 99285 EMERGENCY DEPT VISIT HI MDM: CPT

## 2022-01-29 PROCEDURE — 25010000002 KETOROLAC TROMETHAMINE PER 15 MG: Performed by: EMERGENCY MEDICINE

## 2022-01-29 RX ORDER — CHOLECALCIFEROL (VITAMIN D3) 125 MCG
5 CAPSULE ORAL NIGHTLY PRN
Status: DISCONTINUED | OUTPATIENT
Start: 2022-01-29 | End: 2022-01-31 | Stop reason: HOSPADM

## 2022-01-29 RX ORDER — SODIUM CHLORIDE 0.9 % (FLUSH) 0.9 %
10 SYRINGE (ML) INJECTION EVERY 12 HOURS SCHEDULED
Status: DISCONTINUED | OUTPATIENT
Start: 2022-01-29 | End: 2022-01-31 | Stop reason: HOSPADM

## 2022-01-29 RX ORDER — ONDANSETRON 2 MG/ML
4 INJECTION INTRAMUSCULAR; INTRAVENOUS EVERY 6 HOURS PRN
Status: DISCONTINUED | OUTPATIENT
Start: 2022-01-29 | End: 2022-01-31 | Stop reason: HOSPADM

## 2022-01-29 RX ORDER — KETOROLAC TROMETHAMINE 15 MG/ML
15 INJECTION, SOLUTION INTRAMUSCULAR; INTRAVENOUS ONCE
Status: COMPLETED | OUTPATIENT
Start: 2022-01-29 | End: 2022-01-29

## 2022-01-29 RX ORDER — ASPIRIN 81 MG/1
324 TABLET, CHEWABLE ORAL ONCE
Status: COMPLETED | OUTPATIENT
Start: 2022-01-29 | End: 2022-01-29

## 2022-01-29 RX ORDER — SODIUM CHLORIDE 0.9 % (FLUSH) 0.9 %
10 SYRINGE (ML) INJECTION AS NEEDED
Status: DISCONTINUED | OUTPATIENT
Start: 2022-01-29 | End: 2022-01-31 | Stop reason: HOSPADM

## 2022-01-29 RX ORDER — METHYLPREDNISOLONE SODIUM SUCCINATE 40 MG/ML
80 INJECTION, POWDER, LYOPHILIZED, FOR SOLUTION INTRAMUSCULAR; INTRAVENOUS ONCE
Status: COMPLETED | OUTPATIENT
Start: 2022-01-29 | End: 2022-01-29

## 2022-01-29 RX ORDER — HYDROCODONE BITARTRATE AND ACETAMINOPHEN 5; 325 MG/1; MG/1
1 TABLET ORAL EVERY 6 HOURS PRN
Status: DISCONTINUED | OUTPATIENT
Start: 2022-01-29 | End: 2022-01-31 | Stop reason: HOSPADM

## 2022-01-29 RX ORDER — OXCARBAZEPINE 300 MG/1
300 TABLET, FILM COATED ORAL EVERY 12 HOURS SCHEDULED
Status: DISCONTINUED | OUTPATIENT
Start: 2022-01-29 | End: 2022-01-29

## 2022-01-29 RX ORDER — ACETAMINOPHEN 325 MG/1
650 TABLET ORAL EVERY 4 HOURS PRN
Status: DISCONTINUED | OUTPATIENT
Start: 2022-01-29 | End: 2022-01-31 | Stop reason: HOSPADM

## 2022-01-29 RX ADMIN — OXCARBAZEPINE 1050 MG: 300 TABLET, FILM COATED ORAL at 22:57

## 2022-01-29 RX ADMIN — KETOROLAC TROMETHAMINE 15 MG: 15 INJECTION, SOLUTION INTRAMUSCULAR; INTRAVENOUS at 19:28

## 2022-01-29 RX ADMIN — METHYLPREDNISOLONE SODIUM SUCCINATE 80 MG: 40 INJECTION, POWDER, FOR SOLUTION INTRAMUSCULAR; INTRAVENOUS at 19:28

## 2022-01-29 RX ADMIN — IOPAMIDOL 85 ML: 755 INJECTION, SOLUTION INTRAVENOUS at 16:58

## 2022-01-29 RX ADMIN — HYDROCODONE BITARTRATE AND ACETAMINOPHEN 1 TABLET: 5; 325 TABLET ORAL at 22:58

## 2022-01-29 RX ADMIN — SODIUM CHLORIDE, PRESERVATIVE FREE 10 ML: 5 INJECTION INTRAVENOUS at 22:58

## 2022-01-29 RX ADMIN — ASPIRIN 324 MG: 81 TABLET, CHEWABLE ORAL at 13:54

## 2022-01-30 ENCOUNTER — APPOINTMENT (OUTPATIENT)
Dept: CARDIOLOGY | Facility: HOSPITAL | Age: 42
End: 2022-01-30

## 2022-01-30 ENCOUNTER — APPOINTMENT (OUTPATIENT)
Dept: CT IMAGING | Facility: HOSPITAL | Age: 42
End: 2022-01-30

## 2022-01-30 LAB
ALPHA-FETOPROTEIN: 2.14 NG/ML (ref 0–8.3)
ANION GAP SERPL CALCULATED.3IONS-SCNC: 10 MMOL/L (ref 5–15)
ASCENDING AORTA: 3 CM
BH CV ECHO MEAS - AO MAX PG (FULL): 4.2 MMHG
BH CV ECHO MEAS - AO MAX PG: 6.3 MMHG
BH CV ECHO MEAS - AO MEAN PG (FULL): 2.1 MMHG
BH CV ECHO MEAS - AO MEAN PG: 3.5 MMHG
BH CV ECHO MEAS - AO ROOT AREA (BSA CORRECTED): 1.2
BH CV ECHO MEAS - AO ROOT AREA: 6.8 CM^2
BH CV ECHO MEAS - AO ROOT DIAM: 2.9 CM
BH CV ECHO MEAS - AO V2 MAX: 126 CM/SEC
BH CV ECHO MEAS - AO V2 MEAN: 86.7 CM/SEC
BH CV ECHO MEAS - AO V2 VTI: 25.9 CM
BH CV ECHO MEAS - ASC AORTA: 3 CM
BH CV ECHO MEAS - AVA(I,A): 2 CM^2
BH CV ECHO MEAS - AVA(I,D): 2 CM^2
BH CV ECHO MEAS - AVA(V,A): 1.8 CM^2
BH CV ECHO MEAS - AVA(V,D): 1.8 CM^2
BH CV ECHO MEAS - BSA(HAYCOCK): 2.8 M^2
BH CV ECHO MEAS - BSA: 2.6 M^2
BH CV ECHO MEAS - BZI_BMI: 51.2 KILOGRAMS/M^2
BH CV ECHO MEAS - BZI_METRIC_HEIGHT: 172.7 CM
BH CV ECHO MEAS - BZI_METRIC_WEIGHT: 152.9 KG
BH CV ECHO MEAS - EDV(CUBED): 99.9 ML
BH CV ECHO MEAS - EDV(MOD-SP2): 142 ML
BH CV ECHO MEAS - EDV(MOD-SP4): 166 ML
BH CV ECHO MEAS - EDV(TEICH): 99.3 ML
BH CV ECHO MEAS - EF(CUBED): 72.6 %
BH CV ECHO MEAS - EF(MOD-BP): 63 %
BH CV ECHO MEAS - EF(MOD-SP2): 61.3 %
BH CV ECHO MEAS - EF(MOD-SP4): 65.7 %
BH CV ECHO MEAS - EF(TEICH): 64.4 %
BH CV ECHO MEAS - ESV(CUBED): 27.3 ML
BH CV ECHO MEAS - ESV(MOD-SP2): 55 ML
BH CV ECHO MEAS - ESV(MOD-SP4): 57 ML
BH CV ECHO MEAS - ESV(TEICH): 35.4 ML
BH CV ECHO MEAS - FS: 35.1 %
BH CV ECHO MEAS - IVS/LVPW: 0.83
BH CV ECHO MEAS - IVSD: 0.89 CM
BH CV ECHO MEAS - LA DIMENSION: 3 CM
BH CV ECHO MEAS - LA/AO: 1
BH CV ECHO MEAS - LAD MAJOR: 6.3 CM
BH CV ECHO MEAS - LAT PEAK E' VEL: 13.3 CM/SEC
BH CV ECHO MEAS - LATERAL E/E' RATIO: 7
BH CV ECHO MEAS - LV DIASTOLIC VOL/BSA (35-75): 65.1 ML/M^2
BH CV ECHO MEAS - LV IVRT: 0.06 SEC
BH CV ECHO MEAS - LV MASS(C)D: 157 GRAMS
BH CV ECHO MEAS - LV MASS(C)DI: 61.5 GRAMS/M^2
BH CV ECHO MEAS - LV MAX PG: 2.2 MMHG
BH CV ECHO MEAS - LV MEAN PG: 1.3 MMHG
BH CV ECHO MEAS - LV SYSTOLIC VOL/BSA (12-30): 22.3 ML/M^2
BH CV ECHO MEAS - LV V1 MAX: 73.4 CM/SEC
BH CV ECHO MEAS - LV V1 MEAN: 54.1 CM/SEC
BH CV ECHO MEAS - LV V1 VTI: 16.3 CM
BH CV ECHO MEAS - LVIDD: 4.6 CM
BH CV ECHO MEAS - LVIDS: 3 CM
BH CV ECHO MEAS - LVLD AP2: 9.7 CM
BH CV ECHO MEAS - LVLD AP4: 10.3 CM
BH CV ECHO MEAS - LVLS AP2: 9.1 CM
BH CV ECHO MEAS - LVLS AP4: 8.3 CM
BH CV ECHO MEAS - LVOT AREA (M): 3.1 CM^2
BH CV ECHO MEAS - LVOT AREA: 3.1 CM^2
BH CV ECHO MEAS - LVOT DIAM: 2 CM
BH CV ECHO MEAS - LVPWD: 1.1 CM
BH CV ECHO MEAS - MED PEAK E' VEL: 11.6 CM/SEC
BH CV ECHO MEAS - MEDIAL E/E' RATIO: 8
BH CV ECHO MEAS - MV A MAX VEL: 66.6 CM/SEC
BH CV ECHO MEAS - MV DEC SLOPE: 372.1 CM/SEC^2
BH CV ECHO MEAS - MV DEC TIME: 0.18 SEC
BH CV ECHO MEAS - MV E MAX VEL: 94.3 CM/SEC
BH CV ECHO MEAS - MV E/A: 1.4
BH CV ECHO MEAS - MV P1/2T MAX VEL: 102.5 CM/SEC
BH CV ECHO MEAS - MV P1/2T: 80.7 MSEC
BH CV ECHO MEAS - MVA P1/2T LCG: 2.1 CM^2
BH CV ECHO MEAS - MVA(P1/2T): 2.7 CM^2
BH CV ECHO MEAS - PA ACC SLOPE: 488.8 CM/SEC^2
BH CV ECHO MEAS - PA ACC TIME: 0.1 SEC
BH CV ECHO MEAS - PA PR(ACCEL): 32.7 MMHG
BH CV ECHO MEAS - RAP SYSTOLE: 8 MMHG
BH CV ECHO MEAS - RVDD: 4 CM
BH CV ECHO MEAS - RVSP: 18 MMHG
BH CV ECHO MEAS - SI(AO): 69 ML/M^2
BH CV ECHO MEAS - SI(CUBED): 28.4 ML/M^2
BH CV ECHO MEAS - SI(LVOT): 19.8 ML/M^2
BH CV ECHO MEAS - SI(MOD-SP2): 34.1 ML/M^2
BH CV ECHO MEAS - SI(MOD-SP4): 42.7 ML/M^2
BH CV ECHO MEAS - SI(TEICH): 25.1 ML/M^2
BH CV ECHO MEAS - SV(AO): 176.1 ML
BH CV ECHO MEAS - SV(CUBED): 72.5 ML
BH CV ECHO MEAS - SV(LVOT): 50.5 ML
BH CV ECHO MEAS - SV(MOD-SP2): 87 ML
BH CV ECHO MEAS - SV(MOD-SP4): 109 ML
BH CV ECHO MEAS - SV(TEICH): 63.9 ML
BH CV ECHO MEAS - TAPSE (>1.6): 2.2 CM
BH CV ECHO MEAS - TR MAX PG: 10 MMHG
BH CV ECHO MEAS - TR MAX VEL: 160.7 CM/SEC
BH CV ECHO MEASUREMENTS AVERAGE E/E' RATIO: 7.57
BH CV VAS BP LEFT ARM: NORMAL MMHG
BH CV XLRA - RV BASE: 4.4 CM
BH CV XLRA - RV LENGTH: 8.9 CM
BH CV XLRA - RV MID: 3.4 CM
BH CV XLRA - TDI S': 13.2 CM/SEC
BUN SERPL-MCNC: 13 MG/DL (ref 6–20)
BUN/CREAT SERPL: 17.8 (ref 7–25)
CALCIUM SPEC-SCNC: 9 MG/DL (ref 8.6–10.5)
CHLORIDE SERPL-SCNC: 96 MMOL/L (ref 98–107)
CHOLEST SERPL-MCNC: 152 MG/DL (ref 0–200)
CO2 SERPL-SCNC: 25 MMOL/L (ref 22–29)
CREAT SERPL-MCNC: 0.73 MG/DL (ref 0.76–1.27)
DEPRECATED RDW RBC AUTO: 39.8 FL (ref 37–54)
ERYTHROCYTE [DISTWIDTH] IN BLOOD BY AUTOMATED COUNT: 12.2 % (ref 12.3–15.4)
GFR SERPL CREATININE-BSD FRML MDRD: 145 ML/MIN/1.73
GLUCOSE SERPL-MCNC: 114 MG/DL (ref 65–99)
HBA1C MFR BLD: 4.9 % (ref 4.8–5.6)
HCT VFR BLD AUTO: 36.4 % (ref 37.5–51)
HDLC SERPL-MCNC: 78 MG/DL (ref 40–60)
HGB BLD-MCNC: 12.7 G/DL (ref 13–17.7)
LDH SERPL-CCNC: 373 U/L (ref 135–225)
LDLC SERPL CALC-MCNC: 67 MG/DL (ref 0–100)
LDLC/HDLC SERPL: 0.88 {RATIO}
LV EF 2D ECHO EST: 65 %
MCH RBC QN AUTO: 31.1 PG (ref 26.6–33)
MCHC RBC AUTO-ENTMCNC: 34.9 G/DL (ref 31.5–35.7)
MCV RBC AUTO: 89 FL (ref 79–97)
PLATELET # BLD AUTO: 242 10*3/MM3 (ref 140–450)
PMV BLD AUTO: 9.7 FL (ref 6–12)
POTASSIUM SERPL-SCNC: 4.4 MMOL/L (ref 3.5–5.2)
RBC # BLD AUTO: 4.09 10*6/MM3 (ref 4.14–5.8)
SODIUM SERPL-SCNC: 131 MMOL/L (ref 136–145)
TRIGL SERPL-MCNC: 26 MG/DL (ref 0–150)
TSH SERPL DL<=0.05 MIU/L-ACNC: 0.47 UIU/ML (ref 0.27–4.2)
VLDLC SERPL-MCNC: 7 MG/DL (ref 5–40)
WBC NRBC COR # BLD: 5.64 10*3/MM3 (ref 3.4–10.8)

## 2022-01-30 PROCEDURE — G0378 HOSPITAL OBSERVATION PER HR: HCPCS

## 2022-01-30 PROCEDURE — 25010000002 SULFUR HEXAFLUORIDE MICROSPH 60.7-25 MG RECONSTITUTED SUSPENSION: Performed by: HOSPITALIST

## 2022-01-30 PROCEDURE — 80061 LIPID PANEL: CPT | Performed by: PHYSICIAN ASSISTANT

## 2022-01-30 PROCEDURE — 80048 BASIC METABOLIC PNL TOTAL CA: CPT | Performed by: PHYSICIAN ASSISTANT

## 2022-01-30 PROCEDURE — 84702 CHORIONIC GONADOTROPIN TEST: CPT | Performed by: INTERNAL MEDICINE

## 2022-01-30 PROCEDURE — 93306 TTE W/DOPPLER COMPLETE: CPT | Performed by: INTERNAL MEDICINE

## 2022-01-30 PROCEDURE — 99215 OFFICE O/P EST HI 40 MIN: CPT | Performed by: INTERNAL MEDICINE

## 2022-01-30 PROCEDURE — 25010000002 IOPAMIDOL 61 % SOLUTION: Performed by: INTERNAL MEDICINE

## 2022-01-30 PROCEDURE — 93010 ELECTROCARDIOGRAM REPORT: CPT | Performed by: INTERNAL MEDICINE

## 2022-01-30 PROCEDURE — 85027 COMPLETE CBC AUTOMATED: CPT | Performed by: PHYSICIAN ASSISTANT

## 2022-01-30 PROCEDURE — 93005 ELECTROCARDIOGRAM TRACING: CPT | Performed by: PHYSICIAN ASSISTANT

## 2022-01-30 PROCEDURE — 83615 LACTATE (LD) (LDH) ENZYME: CPT | Performed by: INTERNAL MEDICINE

## 2022-01-30 PROCEDURE — 74178 CT ABD&PLV WO CNTR FLWD CNTR: CPT

## 2022-01-30 PROCEDURE — 82105 ALPHA-FETOPROTEIN SERUM: CPT | Performed by: INTERNAL MEDICINE

## 2022-01-30 PROCEDURE — 99226 PR SBSQ OBSERVATION CARE/DAY 35 MINUTES: CPT | Performed by: INTERNAL MEDICINE

## 2022-01-30 PROCEDURE — 99243 OFF/OP CNSLTJ NEW/EST LOW 30: CPT | Performed by: INTERNAL MEDICINE

## 2022-01-30 PROCEDURE — 93306 TTE W/DOPPLER COMPLETE: CPT

## 2022-01-30 PROCEDURE — 84443 ASSAY THYROID STIM HORMONE: CPT | Performed by: PHYSICIAN ASSISTANT

## 2022-01-30 PROCEDURE — 83036 HEMOGLOBIN GLYCOSYLATED A1C: CPT | Performed by: PHYSICIAN ASSISTANT

## 2022-01-30 RX ORDER — NAPROXEN 250 MG/1
500 TABLET ORAL 2 TIMES DAILY WITH MEALS
Status: DISCONTINUED | OUTPATIENT
Start: 2022-01-30 | End: 2022-01-31 | Stop reason: HOSPADM

## 2022-01-30 RX ADMIN — OXCARBAZEPINE 1050 MG: 300 TABLET, FILM COATED ORAL at 09:20

## 2022-01-30 RX ADMIN — NAPROXEN 500 MG: 250 TABLET ORAL at 13:00

## 2022-01-30 RX ADMIN — HYDROCODONE BITARTRATE AND ACETAMINOPHEN 1 TABLET: 5; 325 TABLET ORAL at 21:31

## 2022-01-30 RX ADMIN — SODIUM CHLORIDE, PRESERVATIVE FREE 10 ML: 5 INJECTION INTRAVENOUS at 21:31

## 2022-01-30 RX ADMIN — OXCARBAZEPINE 1050 MG: 300 TABLET, FILM COATED ORAL at 21:30

## 2022-01-30 RX ADMIN — SULFUR HEXAFLUORIDE 2 ML: KIT at 08:30

## 2022-01-30 RX ADMIN — IOPAMIDOL 100 ML: 612 INJECTION, SOLUTION INTRAVENOUS at 17:13

## 2022-01-30 RX ADMIN — HYDROCODONE BITARTRATE AND ACETAMINOPHEN 1 TABLET: 5; 325 TABLET ORAL at 09:20

## 2022-01-30 RX ADMIN — NAPROXEN 500 MG: 250 TABLET ORAL at 17:29

## 2022-01-31 ENCOUNTER — READMISSION MANAGEMENT (OUTPATIENT)
Dept: CALL CENTER | Facility: HOSPITAL | Age: 42
End: 2022-01-31

## 2022-01-31 ENCOUNTER — TELEPHONE (OUTPATIENT)
Dept: CARDIOLOGY | Facility: CLINIC | Age: 42
End: 2022-01-31

## 2022-01-31 ENCOUNTER — APPOINTMENT (OUTPATIENT)
Dept: CARDIOLOGY | Facility: HOSPITAL | Age: 42
End: 2022-01-31

## 2022-01-31 ENCOUNTER — PREP FOR SURGERY (OUTPATIENT)
Dept: OTHER | Facility: HOSPITAL | Age: 42
End: 2022-01-31

## 2022-01-31 VITALS
DIASTOLIC BLOOD PRESSURE: 82 MMHG | SYSTOLIC BLOOD PRESSURE: 132 MMHG | RESPIRATION RATE: 17 BRPM | WEIGHT: 315 LBS | HEIGHT: 68 IN | BODY MASS INDEX: 47.74 KG/M2 | TEMPERATURE: 98.1 F | HEART RATE: 67 BPM | OXYGEN SATURATION: 99 %

## 2022-01-31 DIAGNOSIS — R07.9 CHEST PAIN, UNSPECIFIED TYPE: Primary | ICD-10-CM

## 2022-01-31 DIAGNOSIS — J98.59 MEDIASTINAL MASS: Primary | ICD-10-CM

## 2022-01-31 LAB
ALBUMIN SERPL-MCNC: 3.8 G/DL (ref 3.5–5.2)
ALBUMIN/GLOB SERPL: 1.4 G/DL
ALP SERPL-CCNC: 37 U/L (ref 39–117)
ALT SERPL W P-5'-P-CCNC: 22 U/L (ref 1–41)
ANION GAP SERPL CALCULATED.3IONS-SCNC: 8 MMOL/L (ref 5–15)
APTT PPP: 37.6 SECONDS (ref 22–39)
AST SERPL-CCNC: 25 U/L (ref 1–40)
BILIRUB SERPL-MCNC: 0.3 MG/DL (ref 0–1.2)
BUN SERPL-MCNC: 13 MG/DL (ref 6–20)
BUN/CREAT SERPL: 16.7 (ref 7–25)
CALCIUM SPEC-SCNC: 8.6 MG/DL (ref 8.6–10.5)
CHLORIDE SERPL-SCNC: 96 MMOL/L (ref 98–107)
CO2 SERPL-SCNC: 27 MMOL/L (ref 22–29)
CREAT SERPL-MCNC: 0.78 MG/DL (ref 0.76–1.27)
DEPRECATED RDW RBC AUTO: 39.3 FL (ref 37–54)
ERYTHROCYTE [DISTWIDTH] IN BLOOD BY AUTOMATED COUNT: 12 % (ref 12.3–15.4)
GFR SERPL CREATININE-BSD FRML MDRD: 134 ML/MIN/1.73
GLOBULIN UR ELPH-MCNC: 2.7 GM/DL
GLUCOSE SERPL-MCNC: 91 MG/DL (ref 65–99)
HCT VFR BLD AUTO: 34.6 % (ref 37.5–51)
HGB BLD-MCNC: 11.9 G/DL (ref 13–17.7)
INR PPP: 1.02 (ref 0.85–1.16)
MCH RBC QN AUTO: 30.7 PG (ref 26.6–33)
MCHC RBC AUTO-ENTMCNC: 34.4 G/DL (ref 31.5–35.7)
MCV RBC AUTO: 89.4 FL (ref 79–97)
PLATELET # BLD AUTO: 232 10*3/MM3 (ref 140–450)
PMV BLD AUTO: 9.5 FL (ref 6–12)
POTASSIUM SERPL-SCNC: 4.2 MMOL/L (ref 3.5–5.2)
PROT SERPL-MCNC: 6.5 G/DL (ref 6–8.5)
PROTHROMBIN TIME: 13.1 SECONDS (ref 11.4–14.4)
QT INTERVAL: 384 MS
QTC INTERVAL: 440 MS
RBC # BLD AUTO: 3.87 10*6/MM3 (ref 4.14–5.8)
SODIUM SERPL-SCNC: 131 MMOL/L (ref 136–145)
WBC NRBC COR # BLD: 3.82 10*3/MM3 (ref 3.4–10.8)

## 2022-01-31 PROCEDURE — 85027 COMPLETE CBC AUTOMATED: CPT | Performed by: INTERNAL MEDICINE

## 2022-01-31 PROCEDURE — 25010000002 INFLUENZA VAC SPLIT QUAD 0.5 ML SUSPENSION PREFILLED SYRINGE: Performed by: HOSPITALIST

## 2022-01-31 PROCEDURE — 90686 IIV4 VACC NO PRSV 0.5 ML IM: CPT | Performed by: HOSPITALIST

## 2022-01-31 PROCEDURE — 80053 COMPREHEN METABOLIC PANEL: CPT | Performed by: INTERNAL MEDICINE

## 2022-01-31 PROCEDURE — G0378 HOSPITAL OBSERVATION PER HR: HCPCS

## 2022-01-31 PROCEDURE — 85730 THROMBOPLASTIN TIME PARTIAL: CPT | Performed by: INTERNAL MEDICINE

## 2022-01-31 PROCEDURE — 85610 PROTHROMBIN TIME: CPT | Performed by: INTERNAL MEDICINE

## 2022-01-31 PROCEDURE — 99217 PR OBSERVATION CARE DISCHARGE MANAGEMENT: CPT | Performed by: INTERNAL MEDICINE

## 2022-01-31 PROCEDURE — G0008 ADMIN INFLUENZA VIRUS VAC: HCPCS | Performed by: HOSPITALIST

## 2022-01-31 RX ORDER — PANTOPRAZOLE SODIUM 40 MG/1
40 TABLET, DELAYED RELEASE ORAL
Qty: 30 TABLET | Refills: 0 | Status: SHIPPED | OUTPATIENT
Start: 2022-01-31 | End: 2022-03-22

## 2022-01-31 RX ORDER — PANTOPRAZOLE SODIUM 40 MG/1
40 TABLET, DELAYED RELEASE ORAL
Status: DISCONTINUED | OUTPATIENT
Start: 2022-01-31 | End: 2022-01-31 | Stop reason: HOSPADM

## 2022-01-31 RX ADMIN — PANTOPRAZOLE SODIUM 40 MG: 40 TABLET, DELAYED RELEASE ORAL at 10:24

## 2022-01-31 RX ADMIN — SODIUM CHLORIDE, PRESERVATIVE FREE 10 ML: 5 INJECTION INTRAVENOUS at 10:24

## 2022-01-31 RX ADMIN — NAPROXEN 500 MG: 250 TABLET ORAL at 09:43

## 2022-01-31 RX ADMIN — INFLUENZA VIRUS VACCINE 0.5 ML: 15; 15; 15; 15 SUSPENSION INTRAMUSCULAR at 12:10

## 2022-01-31 RX ADMIN — OXCARBAZEPINE 1050 MG: 300 TABLET, FILM COATED ORAL at 09:45

## 2022-01-31 NOTE — OUTREACH NOTE
Prep Survey      Responses   [facility] facility patient discharged from? [facility]   Is LACE score < 7 ? Yes   Emergency Room discharge w/ pulse ox? No   Eligibility [facility]   Date of Admission 01/29/22   Date of Discharge 01/31/22   Discharge Disposition Home or Self Care   Discharge diagnosis atypical chest pain, mediastinal mass   Does the patient have one of the following disease processes/diagnoses(primary or secondary)? Other   Does the patient have Home health ordered? No   Is there a DME ordered? No   Prep survey completed? Yes          Holley Quintana RN

## 2022-01-31 NOTE — TELEPHONE ENCOUNTER
----- Message from Bob Martinez MD sent at 1/30/2022 10:11 AM EST -----  Patient was in the hospital for chest pain and troponin elevation, please schedule him for a cardiac PET scan ASA and give him an appointment to follow-up with me 4-6 weeks after the PET scan.

## 2022-02-01 ENCOUNTER — HOSPITAL ENCOUNTER (OUTPATIENT)
Dept: CT IMAGING | Facility: HOSPITAL | Age: 42
Discharge: HOME | End: 2022-02-01
Admitting: INTERNAL MEDICINE

## 2022-02-01 ENCOUNTER — TRANSITIONAL CARE MANAGEMENT TELEPHONE ENCOUNTER (OUTPATIENT)
Dept: CALL CENTER | Facility: HOSPITAL | Age: 42
End: 2022-02-01

## 2022-02-01 VITALS
HEART RATE: 84 BPM | SYSTOLIC BLOOD PRESSURE: 132 MMHG | OXYGEN SATURATION: 98 % | DIASTOLIC BLOOD PRESSURE: 74 MMHG | TEMPERATURE: 97.5 F | RESPIRATION RATE: 20 BRPM

## 2022-02-01 DIAGNOSIS — J98.59 MEDIASTINAL MASS: ICD-10-CM

## 2022-02-01 LAB — HCG INTACT+B SERPL-ACNC: <1 MIU/ML (ref 0–3)

## 2022-02-01 PROCEDURE — 88341 IMHCHEM/IMCYTCHM EA ADD ANTB: CPT | Performed by: INTERNAL MEDICINE

## 2022-02-01 PROCEDURE — 25010000002 FENTANYL CITRATE (PF) 50 MCG/ML SOLUTION: Performed by: RADIOLOGY

## 2022-02-01 PROCEDURE — 0 LIDOCAINE 1 % SOLUTION: Performed by: RADIOLOGY

## 2022-02-01 PROCEDURE — 25010000002 MIDAZOLAM PER 1 MG: Performed by: RADIOLOGY

## 2022-02-01 PROCEDURE — 88307 TISSUE EXAM BY PATHOLOGIST: CPT | Performed by: INTERNAL MEDICINE

## 2022-02-01 PROCEDURE — 88342 IMHCHEM/IMCYTCHM 1ST ANTB: CPT | Performed by: INTERNAL MEDICINE

## 2022-02-01 PROCEDURE — 88360 TUMOR IMMUNOHISTOCHEM/MANUAL: CPT | Performed by: INTERNAL MEDICINE

## 2022-02-01 RX ORDER — MIDAZOLAM HYDROCHLORIDE 1 MG/ML
INJECTION INTRAMUSCULAR; INTRAVENOUS
Status: COMPLETED | OUTPATIENT
Start: 2022-02-01 | End: 2022-02-01

## 2022-02-01 RX ORDER — LIDOCAINE HYDROCHLORIDE 10 MG/ML
10 INJECTION, SOLUTION INFILTRATION; PERINEURAL ONCE
Status: COMPLETED | OUTPATIENT
Start: 2022-02-01 | End: 2022-02-01

## 2022-02-01 RX ORDER — SODIUM CHLORIDE 0.9 % (FLUSH) 0.9 %
10 SYRINGE (ML) INJECTION AS NEEDED
Status: DISCONTINUED | OUTPATIENT
Start: 2022-02-01 | End: 2022-02-03 | Stop reason: HOSPADM

## 2022-02-01 RX ORDER — SODIUM CHLORIDE 0.9 % (FLUSH) 0.9 %
3 SYRINGE (ML) INJECTION EVERY 12 HOURS SCHEDULED
Status: DISCONTINUED | OUTPATIENT
Start: 2022-02-01 | End: 2022-02-03 | Stop reason: HOSPADM

## 2022-02-01 RX ORDER — MIDAZOLAM HYDROCHLORIDE 1 MG/ML
INJECTION INTRAMUSCULAR; INTRAVENOUS
Status: DISPENSED
Start: 2022-02-01 | End: 2022-02-01

## 2022-02-01 RX ORDER — FENTANYL CITRATE 50 UG/ML
INJECTION, SOLUTION INTRAMUSCULAR; INTRAVENOUS
Status: COMPLETED | OUTPATIENT
Start: 2022-02-01 | End: 2022-02-01

## 2022-02-01 RX ORDER — FENTANYL CITRATE 50 UG/ML
INJECTION, SOLUTION INTRAMUSCULAR; INTRAVENOUS
Status: DISPENSED
Start: 2022-02-01 | End: 2022-02-01

## 2022-02-01 RX ORDER — HYDROCODONE BITARTRATE AND ACETAMINOPHEN 5; 325 MG/1; MG/1
1 TABLET ORAL EVERY 4 HOURS PRN
Status: DISCONTINUED | OUTPATIENT
Start: 2022-02-01 | End: 2022-02-03 | Stop reason: HOSPADM

## 2022-02-01 RX ADMIN — LIDOCAINE HYDROCHLORIDE 10 ML: 10 INJECTION, SOLUTION INFILTRATION; PERINEURAL at 12:22

## 2022-02-01 RX ADMIN — MIDAZOLAM HYDROCHLORIDE 1 MG: 1 INJECTION, SOLUTION INTRAMUSCULAR; INTRAVENOUS at 12:05

## 2022-02-01 RX ADMIN — FENTANYL CITRATE 50 MCG: 50 INJECTION, SOLUTION INTRAMUSCULAR; INTRAVENOUS at 12:05

## 2022-02-01 NOTE — OUTREACH NOTE
Call Center TCM Note      Responses   [facility] [place], operated by [facility] patient discharged from? [place]   Does the patient have one of the following disease processes/diagnoses(primary or secondary)? Other   TCM attempt successful? Yes   Discharge diagnosis atypical chest pain, mediastinal mass   Meds reviewed with patient/caregiver? Yes   Is the patient having any side effects they believe may be caused by any medication additions or changes? No   Does the patient have all medications ordered at discharge? Yes   Is the patient taking all medications as directed (includes completed medication regime)? Yes   Does the patient have a primary care provider?  Yes   Does the patient have an appointment with their PCP within 7 days of discharge? Yes   Comments regarding PCP TCM FWP with PCP Dr Coates is 02/03/2022. Pt completed lung bx today for mass noted on scans while admitted.    Has the patient kept scheduled appointments due by today? N/A   Has home health visited the patient within 72 hours of discharge? N/A   Psychosocial issues? No   Did the patient receive a copy of their discharge instructions? Yes   Nursing interventions Reviewed instructions with patient   What is the patient's perception of their health status since discharge? Improving   Is the patient/caregiver able to teach back signs and symptoms related to disease process for when to call PCP? Yes   Is the patient/caregiver able to teach back signs and symptoms related to disease process for when to call 911? Yes   Is the patient/caregiver able to teach back the hierarchy of who to call/visit for symptoms/problems? PCP, Specialist, Home health nurse, Urgent Care, ED, 911 Yes   If the patient is a current smoker, are they able to teach back resources for cessation? Not a smoker   TCM call completed? Yes   Wrap up additional comments Pt doing ok, no issues since home. New med Protonix in place. No questions at this time. TCM FWP with PCP Dr Coates is 02/03/2022. Pt  completed lung bx today for mass noted on scans while admitted.           Estefany Esparza MA    2/1/2022, 13:17 EST

## 2022-02-01 NOTE — NURSING NOTE
Image guided right lung biopsy performed by Jarett Coe MD. Four samples obtained and sent to the lab for testing. Patient was sedated for 20 minutes, and was given 1 mg Versed and 50 mcg fentanyl. DLP 1108. Patient tolerated well. Patient placed right side down with bed rest orders. Report called the nurse.

## 2022-02-01 NOTE — PRE-PROCEDURE NOTE
[facility]   Vascular Interventional Radiology  History & Physicial    Patient Name:Jose Hardin    : 1980    MRN: [id number]    Primary Care Physician: Giovanny Coates DO    Referring Physician: Marian Wiggins MD     Date of admission: 2022    Subjective     Reason for Consult: Biopsy Mediastinal Mass    History of Present Illness     Jose Hardin is a 41 y.o. male referred to IR as noted above.      Active Hospital Problems:  There are no active hospital problems to display for this patient.      Personal History     Past Medical History:   Diagnosis Date   • Acromioclavicular separation Roughly 20 years ago    High school football injury   • Anxiety    • Knee sprain Over 20 years ago    Old injury   • Knee swelling Early Dec.    Recent X-Ray for this visit   • Seizures ([facility])     having seizures once every two weeks x 2 years       History reviewed. No pertinent surgical history.    Family History: His family history includes Cancer in his father; Heart disease in his paternal grandfather; Hypertension in his father and mother; Kidney disease in his father and mother; Mental illness in his mother.     Social History: He  reports that he has never smoked. He has never used smokeless tobacco. He reports current alcohol use of about 4.0 standard drinks of alcohol per week. He reports that he does not use drugs.    Home Medications:  Diclofenac Sodium, OXcarbazepine, PHARMACY MEDS TO BED CONSULT, naproxen, and pantoprazole    Current Medications:  •  fentaNYL citrate (PF)  •  midazolam  •  sodium chloride  •  sodium chloride     Allergies:  He has No Known Allergies.    Review of Systems    Objective     Visit Vitals  /86 (BP Location: Right arm)   Pulse 85   Temp 97.5 °F (36.4 °C) (Temporal)   Resp 16   SpO2 96%        Physical Exam    A&Ox3. Able to communicate  No Apparent Distress  Average physique      Result Review      I have personally reviewed the  results from the time of this admission to 2/1/2022 11:45 EST and agree with these findings.  [x]  Laboratory  []  Microbiology  [x]  Radiology  []  EKG/Telemetry   []  Cardiology/Vascular   []  Pathology  []  Old records  []  Other:    Most notable findings include: As noted:    Results from last 7 days   Lab Units 01/31/22  0454 01/30/22  0420 01/29/22  1400   INR  1.02  --   --    APTT seconds 37.6  --   --    HEMOGLOBIN g/dL 11.9* 12.7* 14.0   HEMATOCRIT % 34.6* 36.4* 40.9   PLATELETS 10*3/mm3 232 242 229       Estimated Creatinine Clearance: 183.4 mL/min (by C-G formula based on SCr of 0.78 mg/dL).   Creatinine   Date Value Ref Range Status   01/31/2022 0.78 0.76 - 1.27 mg/dL Final   01/30/2022 0.73 (L) 0.76 - 1.27 mg/dL Final   01/29/2022 0.83 0.76 - 1.27 mg/dL Final       COVID19   Date Value Ref Range Status   01/29/2022 Not Detected Not Detected - Ref. Range Final          Assessment / Plan     Jose Hardin is a 41 y.o. male referred to the IR service with above problem.    Plan:   As above.    Jarett oCe MD   Vascular Interventional Radiology  02/01/22   11:45 AM EST

## 2022-02-02 ENCOUNTER — TELEPHONE (OUTPATIENT)
Dept: INFUSION THERAPY | Facility: HOSPITAL | Age: 42
End: 2022-02-02

## 2022-02-02 LAB
QT INTERVAL: 366 MS
QTC INTERVAL: 435 MS

## 2022-02-04 DIAGNOSIS — D49.89 THYMOMA: Primary | ICD-10-CM

## 2022-02-04 LAB
CYTO UR: NORMAL
LAB AP CASE REPORT: NORMAL
LAB AP CLINICAL INFORMATION: NORMAL
LAB AP DIAGNOSIS COMMENT: NORMAL
LAB AP SPECIAL STAINS: NORMAL
PATH REPORT.FINAL DX SPEC: NORMAL
PATH REPORT.GROSS SPEC: NORMAL

## 2022-02-07 ENCOUNTER — OFFICE VISIT (OUTPATIENT)
Dept: ONCOLOGY | Facility: CLINIC | Age: 42
End: 2022-02-07

## 2022-02-07 VITALS
RESPIRATION RATE: 18 BRPM | SYSTOLIC BLOOD PRESSURE: 145 MMHG | OXYGEN SATURATION: 98 % | WEIGHT: 315 LBS | DIASTOLIC BLOOD PRESSURE: 86 MMHG | HEIGHT: 68 IN | BODY MASS INDEX: 47.74 KG/M2 | HEART RATE: 83 BPM | TEMPERATURE: 98.4 F

## 2022-02-07 DIAGNOSIS — D49.89 THYMOMA: Primary | ICD-10-CM

## 2022-02-07 PROCEDURE — 99214 OFFICE O/P EST MOD 30 MIN: CPT | Performed by: INTERNAL MEDICINE

## 2022-02-07 NOTE — PROGRESS NOTES
DATE OF VISIT: 2/7/2022    REASON FOR VISIT: Followup for thymoma     HISTORY OF PRESENT ILLNESS: The patient is a very pleasant 41 y.o. 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 The  patient is here today for scheduled follow-up visit.    SUBJECTIVE: The patient has been doing fairly well. he was able to tolerate  his treatment without any serious side effects. he denied any fever or  chills, no night sweats, denied any headaches    PAST MEDICAL HISTORY/SOCIAL HISTORY/FAMILY HISTORY: Reviewed by me and unchanged from my documentation done on 02/07/22.    Review of Systems   Constitutional: Negative for activity change, appetite change, chills, fatigue, fever and unexpected weight change.   HENT: Negative for hearing loss, mouth sores, nosebleeds, sore throat and trouble swallowing.    Eyes: Negative for visual disturbance.   Respiratory: Negative for cough, chest tightness, shortness of breath and wheezing.    Cardiovascular: Negative for chest pain, palpitations and leg swelling.   Gastrointestinal: Negative for abdominal distention, abdominal pain, blood in stool, constipation, diarrhea, nausea, rectal pain and vomiting.   Endocrine: Negative for cold intolerance and heat intolerance.   Genitourinary: Negative for difficulty urinating, dysuria, frequency and urgency.   Musculoskeletal: Negative for arthralgias, back pain, gait problem, joint swelling and myalgias.   Skin: Negative for rash.   Neurological: Negative for dizziness, tremors, syncope, weakness, light-headedness, numbness and headaches.   Hematological: Negative for adenopathy. Does not bruise/bleed easily.   Psychiatric/Behavioral: Negative for confusion, sleep disturbance and suicidal ideas. The patient is not nervous/anxious.          Current Outpatient Medications:   •  Diclofenac Sodium (VOLTAREN) 1 % gel gel, Apply 4 g topically to the appropriate area as directed 4 (Four) Times a Day As Needed (knee  "pain)., Disp: 100 g, Rfl: 1  •  naproxen (NAPROSYN) 500 MG tablet, Take 1 tablet by mouth 2 (Two) Times a Day., Disp: 60 tablet, Rfl: 2  •  OXcarbazepine (Trileptal) 300 MG tablet, 1,050 mg 2 (Two) Times a Day., Disp: , Rfl:   •  pantoprazole (PROTONIX) 40 MG EC tablet, Take 1 tablet by mouth Every Morning., Disp: 30 tablet, Rfl: 0  •  PHARMACY MEDS TO BED CONSULT, Daily., Disp: , Rfl:     PHYSICAL EXAMINATION:   /86   Pulse 83   Temp 98.4 °F (36.9 °C) (Temporal)   Resp 18   Ht 172.7 cm (67.99\")   Wt (!) 155 kg (341 lb)   SpO2 98%   BMI 51.86 kg/m²    Pain Score    02/07/22 1248   PainSc:   5        ECOG score: 0            ECOG Performance Status: 1 - Symptomatic but completely ambulatory  General Appearance:  alert, cooperative, no apparent distress and appears stated age   Neurologic/Psychiatric: A&O x 3, gait steady, appropriate affect, strength 5/5 in all muscle groups   HEENT:  Normocephalic, without obvious abnormality, mucous membranes moist   Neck: Supple, symmetrical, trachea midline, no adenopathy;  No thyromegaly, masses, or tenderness   Lungs:   Clear to auscultation bilaterally; respirations regular, even, and unlabored bilaterally   Heart:  Regular rate and rhythm, no murmurs appreciated   Abdomen:   Soft, non-tender, non-distended and no organomegaly   Lymph nodes: No cervical, supraclavicular, inguinal or axillary adenopathy noted   Extremities: Normal, atraumatic; no clubbing, cyanosis, or edema    Skin: No rashes, ulcers, or suspicious lesions noted     Hospital Outpatient Visit on 02/01/2022   Component Date Value Ref Range Status   • Case Report 02/01/2022    Final                    Value:Surgical Pathology Report                         Case: [id number]                                  Authorizing Provider:  Marian Wiggins MD         Collected:           02/01/2022 12:16 PM          Ordering Location:     [facility]   Received:            02/01/2022 12:54 PM        "                          CT                                                                           Pathologist:           Dez Serrano MD                                                            Specimen:    Mediastinum, right mediastinal mass                                                       • Clinical Information 02/01/2022    Final                    Value:This result contains rich text formatting which cannot be displayed here.   • Final Diagnosis 02/01/2022    Final                    Value:This result contains rich text formatting which cannot be displayed here.   • Comment 02/01/2022    Final                    Value:This result contains rich text formatting which cannot be displayed here.   • Gross Description 02/01/2022    Final                    Value:This result contains rich text formatting which cannot be displayed here.   • Special Stains 02/01/2022    Final                    Value:This result contains rich text formatting which cannot be displayed here.   • Microscopic Description 02/01/2022    Final                    Value:This result contains rich text formatting which cannot be displayed here.   No results displayed because visit has over 200 results.           Adult Transthoracic Echo Complete W/ Cont if Necessary Per Protocol    Result Date: 1/30/2022  Narrative: · Mild right atrial and right ventricular enlargement. · Normal left ventricular systolic function, estimated EF 65%. · Trace mitral regurgitation. · Trace tricuspid regurgitation with normal RVSP.      CT Abdomen Pelvis With & Without Contrast    Result Date: 1/31/2022  Narrative: EXAMINATION: CT ABDOMEN PELVIS W WO CONTRAST-  INDICATION: mass in chest, staging; J98.59-Other diseases of mediastinum, not elsewhere classified; R07.9-Chest pain, unspecified; R77.8-Other specified abnormalities of plasma proteins  TECHNIQUE: Axial CT of the abdomen and pelvis performed without and with IV contrast, with multiplanar  reconstruction  The radiation dose reduction device was turned on for each scan per the ALARA (As Low as Reasonably Achievable) protocol.  COMPARISON: Images correlated with CT of the chest performed one day prior  FINDINGS: The lung bases are grossly clear. Evaluation of the body wall soft tissues demonstrates no acute finding. Evaluation of the osseous structures demonstrates no evidence of fracture or suspicious osseous lesion. The liver, spleen, pancreas and bilateral adrenal glands demonstrate homogeneous enhancement without evidence of suspicious focal lesion. The kidneys demonstrate symmetric nephrogram and contrast excretion without evidence of hydronephrosis or contour deforming mass. Normal gallbladder. Small and large bowel loops are nondilated. Moderate colonic stool burden is present. The appendix is normal. No free fluid or pneumoperitoneum. No suspicious mesenteric nodularity or retroperitoneal lymphadenopathy. Nonaneurysmal abdominal aorta. The pelvic viscera are unremarkable. Redemonstrated and partially imaged heterogeneous mass noted within the partially imaged anterior mediastinum.      Impression: No acute findings or evidence of metastatic involvement in the abdomen and pelvis.  Moderate colonic stool burden is present without evidence of mechanical obstruction.  Please see separately reported CT chest for description of partially imaged heterogeneous large mediastinal mass.   This report was finalized on 1/31/2022 8:33 AM by Jose Post.      XR Chest 1 View    Result Date: 1/29/2022  Narrative: EXAMINATION: XR CHEST 1 VW-  INDICATION: Chest Pain triage protocol  COMPARISON: NONE  FINDINGS: There are no previous studies for comparison. There is elevation of the right hemidiaphragm and there appears to be a sharply defined 10 cm right perihilar/midlung mass. This would be an unusual pattern for lobar collapse. Lungs otherwise appear clear. The heart shadow itself is normal in size and the  vasculature appears normal.       Impression: Approximately 10 cm right midlung mass and elevation of the right hemidiaphragm. CT scan evaluation is suggested.    This report was finalized on 1/29/2022 2:55 PM by Dr. Norberto Martinez MD.      CT Angiogram Chest    Result Date: 1/29/2022  Narrative: EXAMINATION: CT ANGIOGRAM CHEST-  INDICATION: Mass.  Question PE  TECHNIQUE: CTA of the chest (PE protocol)  COMPARISON: Same-day chest x-ray  FINDINGS:  Normal appearance of the pulmonary arteries without evidence of pulmonary embolism. Unremarkable appearance of the cardiac chambers. No pericardial effusion. Unremarkable appearance of the thoracic aorta.  Corresponding to the mass noted on today's chest x-ray is a large homogeneous anterior mediastinal mass with lobulated margins measuring approximately 7.1 x 8.7 x 8.8 cm. There is no evidence of a cystic or calcific component of this mass. This does not appear to invade the adjacent pleura, sternum, or anterior right ribs. This exerts minimal if any mass effect on the adjacent right atrium. There are no discrete associated separate pleural soft tissue lesions. This appears separate from the thyroid gland. A thin vessel is seen entering the posterior aspect of this mass (series 4 image 45). There is no evidence of separate mediastinal adenopathy.  The trachea and mainstem bronchi are patent. Mild dependent bibasilar atelectasis. Otherwise the lungs are clear without acute infectious or inflammatory process. No evidence of lung mass or suspicious nodule. No pneumothorax. No pleural effusion. Unremarkable appearance of the partially imaged upper abdomen. No acute or suspicious bony findings.       Impression:  No evidence of pulmonary embolism.  7.1 x 8.7 x 8.8 cm homogeneous lobulated circumscribed soft tissue mass of the anterior mediastinum. No evidence of pleural/chest wall or pericardial invasion. This appears separate from the thyroid gland. The appearance is nonspecific  but differential considerations include lymphoma and thymoma or less likely teratoma. No definite evidence of separate mediastinal or hilar adenopathy or separate discrete pleural soft tissue lesions. This lesion will likely need biopsy and recommend referral to appropriate service. Further evaluation with PET/CT scan may be of value.   This report was finalized on 1/29/2022 5:40 PM by Saulo Leger MD.      CT Biopsy Lung Mediastinum    Result Date: 2/1/2022  Narrative: CT BIOPSY LUNG MEDIASTINUM-                                                         History: Right mediastinal mass; J98.59-Other diseases of mediastinum, not elsewhere classified    : Jarett Coe MD.                                                                            Modality: Computed tomography  DOSE REDUCTION: The examination was performed according to departmental dose-optimization program which includes automated exposure control, adjustment of the mA and/or kV according to patient size and/or use of iterative reconstruction technique.  Radiation Dose:  1108 mGy-cm.                                                                                                   SEDATION: Moderate sedation was administered. 1 Milligram of Versed and 50 micrograms of fentanyl IV was used for moderate sedation monitored under my direction. Total intra service time of sedation was minutes. The patient's vital signs were monitored throughout the procedure and recorded in the patient's medical record by the nurse.  Anesthesia: Lidocaine local infiltration.  Estimated blood loss:  < 5 cc.          Technique: A thorough discussion of the risks, benefits, and alternatives of the procedure, and if applicable, moderate sedation was carried out with the patient or the patient's next of kin. Any questions were answered. They verbalized understanding. A written informed consent was then signed.   A timeout was performed prior to starting the  procedure.  The procedure room personnel used personal protective equipment. The operators used sterile gowns and gloves in addition. The surgical site was prepped with chlorhexidine gluconate and draped in the maximal sterile fashion.  The patient was positioned supine on the CT table. A preliminary CT was performed to assess the target and determine a safe access site. It showed the anterior mediastinal mass. An anterior intercostal access site was selected. A repeat limited CT was performed with a radiopaque marker in place to determine the exact access site, distance and angle to the target. The access site was sterilely prepped with chlorhexidine gluconate and draped. After local anesthesia infiltration, a dermatotomy was performed.  Using aseptic precautions, under CT guidance, the target lesion was accessed with a 17-gauge guide.  Using 18-gauge coaxial biopsy device, multiple core biopsies of the target tissue were performed. The specimen was submitted in formalin for further processing.  At the end of the procedure, the needle guide was withdrawn and an aseptic dressing applied. The patient tolerated the procedure well. After recovery, the patient was discharged from the department in stable condition.           Complications: None immediate.  Specimen: 4 cores                                                                         Impression: Impression:                                                              Successful CT guided core biopsy of an anterior mediastinal mass as described above.  Thank you for the opportunity to assist in the care of your patient.  This report was finalized on 2/1/2022 12:32 PM by Jarett Coe MD.        ASSESSMENT: The patient is a very pleasant 41 y.o. male  with thymoma    PROBLEM LIST:  1.  Thymoma:  A. fib presented with shortness of breath and chest tightness  B.  CT chest done January 29 2022 revealed 8.8 cm mass right anterior mediastinum  C.  Diagnosed after  CT-guided biopsy done February 1, 2022  2.  Obesity  3.  Heartburn    PLAN:  1.  I did go over the biopsy result with the patient.  I explained to him its most consistent with thymoma.  There was no enough tissue and thymic carcinoma could not be ruled out.  2.  I discussed the case with Dr. Fu from cardiothoracic surgery to coordinate patient care.  I will refer the patient Dr. Fu for surgical resection per  3.  The patient follow with me in 1 month to go over the final pathology report.  If the final pathology confirmed thymoma he will not require adjuvant treatment unless patient has evidence of positive margins.      Marian Wiggins MD  2/7/2022

## 2022-02-10 ENCOUNTER — HOSPITAL ENCOUNTER (OUTPATIENT)
Dept: CARDIOLOGY | Facility: HOSPITAL | Age: 42
Discharge: HOME | End: 2022-02-10
Admitting: INTERNAL MEDICINE

## 2022-02-10 VITALS
BODY MASS INDEX: 47.74 KG/M2 | OXYGEN SATURATION: 100 % | HEART RATE: 105 BPM | WEIGHT: 315 LBS | HEIGHT: 68 IN | SYSTOLIC BLOOD PRESSURE: 139 MMHG | DIASTOLIC BLOOD PRESSURE: 89 MMHG

## 2022-02-10 DIAGNOSIS — R07.9 CHEST PAIN, UNSPECIFIED TYPE: ICD-10-CM

## 2022-02-10 LAB
BH CV REST NUCLEAR ISOTOPE DOSE: 21.3 MCI
BH CV STRESS BP STAGE 1: NORMAL
BH CV STRESS BP STAGE 3: NORMAL
BH CV STRESS COMMENTS STAGE 1: NORMAL
BH CV STRESS DOSE REGADENOSON STAGE 1: 0.4
BH CV STRESS DURATION MIN STAGE 1: 1
BH CV STRESS DURATION MIN STAGE 2: 1
BH CV STRESS DURATION MIN STAGE 3: 1
BH CV STRESS DURATION MIN STAGE 4: 1
BH CV STRESS DURATION SEC STAGE 1: 0
BH CV STRESS DURATION SEC STAGE 2: 0
BH CV STRESS DURATION SEC STAGE 3: 0
BH CV STRESS DURATION SEC STAGE 4: 0
BH CV STRESS HR STAGE 1: 113
BH CV STRESS HR STAGE 2: 107
BH CV STRESS HR STAGE 3: 105
BH CV STRESS HR STAGE 4: 100
BH CV STRESS NUCLEAR ISOTOPE DOSE: 21.5 MCI
BH CV STRESS O2 STAGE 1: 98
BH CV STRESS O2 STAGE 2: 100
BH CV STRESS O2 STAGE 3: 100
BH CV STRESS O2 STAGE 4: 100
BH CV STRESS PROTOCOL 1: NORMAL
BH CV STRESS RECOVERY BP: NORMAL MMHG
BH CV STRESS RECOVERY HR: 97 BPM
BH CV STRESS RECOVERY O2: 99 %
BH CV STRESS STAGE 1: 1
BH CV STRESS STAGE 2: 2
BH CV STRESS STAGE 3: 3
BH CV STRESS STAGE 4: 4
LV EF NUC BP: 61 %
MAXIMAL PREDICTED HEART RATE: 181 BPM
PERCENT MAX PREDICTED HR: 64.09 %
STRESS BASELINE BP: NORMAL MMHG
STRESS BASELINE HR: 105 BPM
STRESS O2 SAT REST: 100 %
STRESS PERCENT HR: 75 %
STRESS POST ESTIMATED WORKLOAD: 1 METS
STRESS POST EXERCISE DUR MIN: 4 MIN
STRESS POST EXERCISE DUR SEC: 0 SEC
STRESS POST O2 SAT PEAK: 100 %
STRESS POST PEAK BP: NORMAL MMHG
STRESS POST PEAK HR: 116 BPM
STRESS TARGET HR: 154 BPM

## 2022-02-10 PROCEDURE — 78431 MYOCRD IMG PET RST&STRS CT: CPT | Performed by: INTERNAL MEDICINE

## 2022-02-10 PROCEDURE — 25010000002 REGADENOSON 0.4 MG/5ML SOLUTION: Performed by: INTERNAL MEDICINE

## 2022-02-10 PROCEDURE — 78431 MYOCRD IMG PET RST&STRS CT: CPT

## 2022-02-10 PROCEDURE — 0 RUBIDIUM CHLORIDE: Performed by: INTERNAL MEDICINE

## 2022-02-10 PROCEDURE — A9555 RB82 RUBIDIUM: HCPCS | Performed by: INTERNAL MEDICINE

## 2022-02-10 PROCEDURE — 93018 CV STRESS TEST I&R ONLY: CPT | Performed by: INTERNAL MEDICINE

## 2022-02-10 PROCEDURE — 93017 CV STRESS TEST TRACING ONLY: CPT

## 2022-02-10 RX ORDER — ASPIRIN 81 MG/1
81 TABLET, CHEWABLE ORAL DAILY
COMMUNITY
End: 2022-04-08

## 2022-02-10 RX ADMIN — RUBIDIUM CHLORIDE RB-82 1 DOSE: 150 INJECTION, SOLUTION INTRAVENOUS at 10:35

## 2022-02-10 RX ADMIN — REGADENOSON 0.4 MG: 0.08 INJECTION, SOLUTION INTRAVENOUS at 10:44

## 2022-02-10 RX ADMIN — RUBIDIUM CHLORIDE RB-82 1 DOSE: 150 INJECTION, SOLUTION INTRAVENOUS at 10:46

## 2022-02-11 ENCOUNTER — APPOINTMENT (OUTPATIENT)
Dept: PREADMISSION TESTING | Facility: HOSPITAL | Age: 42
End: 2022-02-11

## 2022-02-14 ENCOUNTER — HOSPITAL ENCOUNTER (OUTPATIENT)
Dept: CT IMAGING | Facility: HOSPITAL | Age: 42
End: 2022-02-14

## 2022-02-15 ENCOUNTER — OFFICE VISIT (OUTPATIENT)
Dept: CARDIAC SURGERY | Facility: CLINIC | Age: 42
End: 2022-02-15

## 2022-02-15 VITALS
BODY MASS INDEX: 47.74 KG/M2 | DIASTOLIC BLOOD PRESSURE: 76 MMHG | WEIGHT: 315 LBS | OXYGEN SATURATION: 98 % | HEART RATE: 103 BPM | HEIGHT: 68 IN | TEMPERATURE: 98.6 F | SYSTOLIC BLOOD PRESSURE: 120 MMHG

## 2022-02-15 DIAGNOSIS — J98.59 MEDIASTINAL MASS: Primary | ICD-10-CM

## 2022-02-15 DIAGNOSIS — C37 THYMIC CARCINOMA: ICD-10-CM

## 2022-02-15 PROCEDURE — 99204 OFFICE O/P NEW MOD 45 MIN: CPT | Performed by: THORACIC SURGERY (CARDIOTHORACIC VASCULAR SURGERY)

## 2022-02-15 NOTE — PROGRESS NOTES
02/15/2022  Patient Information  [address]    [facility] [id number]   1980  'PCP/Referring Physician'  Giovanny Coates DO  [phone]  Marian Wiggins MD  [phone]  Chief Complaint   Patient presents with   • Consult     n/p per Dr. Wiggins for thymoma. Pt states that he is still having SOB, some pain in the upper left back that is sporatic, with the back having the most pain. Some fatigue as well.        History of Present Illness: 41-year-old -American male with a history of seizure disorder and e-cigarette use who presents with a newly diagnosed thymic neoplasm.  Three weeks ago, the patient presented to the emergency department with chest and back pain.  The patient awoke with significant pain that persisted for several days.  This has slowly spontaneously improved with time.  The patient does note some shortness of breath with exertion.  He denies cough, hoarseness, weakness or double vision.      Patient Active Problem List   Diagnosis   • Class 3 severe obesity due to excess calories without serious comorbidity with body mass index (BMI) of 45.0 to 49.9 in adult (HCC)   • Chronic pain of left knee   • Mediastinal mass   • History of seizures   • Chest pain   • Thymoma     Past Medical History:   Diagnosis Date   • Acromioclavicular separation Roughly 20 years ago    High school football injury   • Anxiety    • Knee sprain Over 20 years ago    Old injury   • Knee swelling Early Dec.    Recent X-Ray for this visit   • Seizures (HCC)     having seizures once every two weeks x 2 years     History reviewed. No pertinent surgical history.    Current Outpatient Medications:   •  aspirin 81 MG chewable tablet, Chew 81 mg Daily., Disp: , Rfl:   •  OXcarbazepine (Trileptal) 300 MG tablet, 1,050 mg 2 (Two) Times a Day., Disp: , Rfl:   •  Diclofenac Sodium (VOLTAREN) 1 %  gel gel, Apply 4 g topically to the appropriate area as directed 4 (Four) Times a Day As Needed (knee pain)., Disp: 100 g, Rfl: 1  •  naproxen (NAPROSYN) 500 MG tablet, Take 1 tablet by mouth 2 (Two) Times a Day., Disp: 60 tablet, Rfl: 2  •  pantoprazole (PROTONIX) 40 MG EC tablet, Take 1 tablet by mouth Every Morning., Disp: 30 tablet, Rfl: 0  •  PHARMACY MEDS TO BED CONSULT, Daily., Disp: , Rfl:   No Known Allergies  Social History     Socioeconomic History   • Marital status: Single   Tobacco Use   • Smoking status: Never Smoker   • Smokeless tobacco: Never Used   Vaping Use   • Vaping Use: Some days   • Substances: CBD   • Devices: Pre-filled or refillable cartridge   Substance and Sexual Activity   • Alcohol use: Yes     Alcohol/week: 4.0 standard drinks     Types: 2 Cans of beer, 2 Standard drinks or equivalent per week   • Drug use: Never   • Sexual activity: Yes     Partners: Female     Birth control/protection: None     Family History   Problem Relation Age of Onset   • Hypertension Mother    • Mental illness Mother    • Kidney disease Mother    • Hypertension Father    • Kidney disease Father    • Cancer Father    • Heart disease Paternal Grandfather      Review of Systems   Constitutional: Positive for malaise/fatigue. Negative for chills, decreased appetite, fever, weight gain and weight loss.   HENT: Negative for hearing loss.    Cardiovascular: Positive for chest pain (sternal pain ) and dyspnea on exertion. Negative for claudication, cyanosis, irregular heartbeat, leg swelling, near-syncope, orthopnea, palpitations, paroxysmal nocturnal dyspnea and syncope.   Endocrine: Negative for polydipsia, polyphagia and polyuria.   Hematologic/Lymphatic: Negative for adenopathy. Bruises/bleeds easily.   Musculoskeletal: Positive for arthritis and joint pain (left knee). Negative for falls, gout, joint swelling, muscle weakness and myalgias.   Gastrointestinal: Negative for abdominal pain, anorexia, dysphagia,  "heartburn, nausea and vomiting.   Genitourinary: Negative for dysuria and hematuria.   Neurological: Negative for dizziness, focal weakness, headaches, loss of balance, numbness, seizures, vertigo and weakness.   Psychiatric/Behavioral: Negative for altered mental status, depression, substance abuse and suicidal ideas. The patient has insomnia and is nervous/anxious.    Allergic/Immunologic: Negative for HIV exposure and persistent infections.     Vitals:    02/15/22 1225   BP: 120/76   Pulse: 103   Temp: 98.6 °F (37 °C)   SpO2: 98%   Weight: (!) 160 kg (352 lb 3.2 oz)   Height: 172.7 cm (68\")      Physical Exam  Vitals reviewed.   Constitutional:       General: He is not in acute distress.     Appearance: Normal appearance.      Comments: -American male who appears stated age   HENT:      Head: Normocephalic and atraumatic.      Nose: Nose normal.   Eyes:      General: No scleral icterus.  Cardiovascular:      Rate and Rhythm: Normal rate and regular rhythm.      Heart sounds: No murmur heard.  No friction rub. No gallop.    Pulmonary:      Effort: Pulmonary effort is normal. No respiratory distress.      Breath sounds: Normal breath sounds. No rhonchi.   Chest:   Breasts:      Right: No axillary adenopathy or supraclavicular adenopathy.      Left: No axillary adenopathy or supraclavicular adenopathy.       Abdominal:      General: There is no distension.      Palpations: Abdomen is soft. There is no mass.      Tenderness: There is no abdominal tenderness. There is no guarding or rebound.   Musculoskeletal:         General: No deformity.      Cervical back: Neck supple.      Right lower leg: No edema.      Left lower leg: No edema.   Lymphadenopathy:      Cervical: No cervical adenopathy.      Upper Body:      Right upper body: No supraclavicular or axillary adenopathy.      Left upper body: No supraclavicular or axillary adenopathy.   Skin:     General: Skin is warm and dry.      Coloration: Skin is not " jaundiced.   Neurological:      Mental Status: He is alert and oriented to person, place, and time.      Gait: Gait normal.   Psychiatric:         Mood and Affect: Mood normal.         Behavior: Behavior normal.         Thought Content: Thought content normal.         Judgment: Judgment normal.         The ROS, past medical history, surgical history, family history, social history and vitals were reviewed by myself and corrected as needed.      Labs/Imaging:  -Mediastinal core biopsies performed 2/1/2022, consistent with spindle cell lesion favoring thymic neoplasm.  -CTA of the chest performed 1/29/2022, personally reviewed, demonstrates a 8.8 x 8.7 x 7.1 cm lobulated anterior mediastinal mass.  This appears separate from the thyroid.  No evidence of mediastinal lymphadenopathy.    Assessment/Plan:   41-year-old -American male with a history of seizure disorder and e-cigarette use who presents with a newly diagnosed thymic neoplasm.  I discussed with the patient performing a thymectomy via sternotomy.  The risks and benefits of surgery were discussed with the patient including pain, bleeding, infection, phrenic nerve injury, shortness of breath, myocardial infarction and death.  The patient understood these risks and wished to proceed with surgery.  A preoperative sniff test will be obtained to ensure adequate diaphragm function given his shortness of breath.    Patient Active Problem List   Diagnosis   • Class 3 severe obesity due to excess calories without serious comorbidity with body mass index (BMI) of 45.0 to 49.9 in adult (HCC)   • Chronic pain of left knee   • Mediastinal mass   • History of seizures   • Chest pain   • Thymoma

## 2022-02-17 ENCOUNTER — PREP FOR SURGERY (OUTPATIENT)
Dept: OTHER | Facility: HOSPITAL | Age: 42
End: 2022-02-17

## 2022-02-17 DIAGNOSIS — C37 THYMIC CARCINOMA: Primary | ICD-10-CM

## 2022-02-17 RX ORDER — CHLORHEXIDINE GLUCONATE 500 MG/1
1 CLOTH TOPICAL EVERY 12 HOURS PRN
Status: CANCELLED | OUTPATIENT
Start: 2022-02-21

## 2022-02-21 ENCOUNTER — HOSPITAL ENCOUNTER (OUTPATIENT)
Dept: PULMONOLOGY | Facility: HOSPITAL | Age: 42
Discharge: HOME | End: 2022-02-21

## 2022-02-21 ENCOUNTER — HOSPITAL ENCOUNTER (OUTPATIENT)
Dept: GENERAL RADIOLOGY | Facility: HOSPITAL | Age: 42
Discharge: HOME | End: 2022-02-21

## 2022-02-21 ENCOUNTER — PRE-ADMISSION TESTING (OUTPATIENT)
Dept: PREADMISSION TESTING | Facility: HOSPITAL | Age: 42
End: 2022-02-21

## 2022-02-21 VITALS — WEIGHT: 315 LBS | OXYGEN SATURATION: 97 % | HEIGHT: 68 IN | BODY MASS INDEX: 47.74 KG/M2

## 2022-02-21 DIAGNOSIS — C37 THYMIC CARCINOMA: ICD-10-CM

## 2022-02-21 LAB
ABO GROUP BLD: NORMAL
ALBUMIN SERPL-MCNC: 4.3 G/DL (ref 3.5–5.2)
ALP SERPL-CCNC: 45 U/L (ref 39–117)
ALT SERPL W P-5'-P-CCNC: 30 U/L (ref 1–41)
ANION GAP SERPL CALCULATED.3IONS-SCNC: 8 MMOL/L (ref 5–15)
AST SERPL-CCNC: 29 U/L (ref 1–40)
BASOPHILS # BLD AUTO: 0.04 10*3/MM3 (ref 0–0.2)
BASOPHILS NFR BLD AUTO: 0.9 % (ref 0–1.5)
BILIRUB CONJ SERPL-MCNC: <0.2 MG/DL (ref 0–0.3)
BILIRUB INDIRECT SERPL-MCNC: NORMAL MG/DL
BILIRUB SERPL-MCNC: 0.4 MG/DL (ref 0–1.2)
BLD GP AB SCN SERPL QL: NEGATIVE
BUN SERPL-MCNC: 12 MG/DL (ref 6–20)
BUN/CREAT SERPL: 13 (ref 7–25)
CALCIUM SPEC-SCNC: 9.4 MG/DL (ref 8.6–10.5)
CHLORIDE SERPL-SCNC: 101 MMOL/L (ref 98–107)
CO2 SERPL-SCNC: 28 MMOL/L (ref 22–29)
CREAT SERPL-MCNC: 0.92 MG/DL (ref 0.76–1.27)
DEPRECATED RDW RBC AUTO: 43.2 FL (ref 37–54)
EOSINOPHIL # BLD AUTO: 0.9 10*3/MM3 (ref 0–0.4)
EOSINOPHIL NFR BLD AUTO: 20.9 % (ref 0.3–6.2)
ERYTHROCYTE [DISTWIDTH] IN BLOOD BY AUTOMATED COUNT: 13.1 % (ref 12.3–15.4)
FLUAV RNA RESP QL NAA+PROBE: NOT DETECTED
FLUBV RNA RESP QL NAA+PROBE: NOT DETECTED
GFR SERPL CREATININE-BSD FRML MDRD: 111 ML/MIN/1.73
GLUCOSE SERPL-MCNC: 97 MG/DL (ref 65–99)
HBA1C MFR BLD: 4.7 % (ref 4.8–5.6)
HCT VFR BLD AUTO: 36.1 % (ref 37.5–51)
HGB BLD-MCNC: 12.1 G/DL (ref 13–17.7)
IMM GRANULOCYTES # BLD AUTO: 0.02 10*3/MM3 (ref 0–0.05)
IMM GRANULOCYTES NFR BLD AUTO: 0.5 % (ref 0–0.5)
INR PPP: 1.03 (ref 0.85–1.16)
LYMPHOCYTES # BLD AUTO: 1.34 10*3/MM3 (ref 0.7–3.1)
LYMPHOCYTES NFR BLD AUTO: 31.2 % (ref 19.6–45.3)
MCH RBC QN AUTO: 30.5 PG (ref 26.6–33)
MCHC RBC AUTO-ENTMCNC: 33.5 G/DL (ref 31.5–35.7)
MCV RBC AUTO: 90.9 FL (ref 79–97)
MONOCYTES # BLD AUTO: 0.44 10*3/MM3 (ref 0.1–0.9)
MONOCYTES NFR BLD AUTO: 10.2 % (ref 5–12)
NEUTROPHILS NFR BLD AUTO: 1.56 10*3/MM3 (ref 1.7–7)
NEUTROPHILS NFR BLD AUTO: 36.3 % (ref 42.7–76)
NRBC BLD AUTO-RTO: 0 /100 WBC (ref 0–0.2)
PLATELET # BLD AUTO: 266 10*3/MM3 (ref 140–450)
PMV BLD AUTO: 9 FL (ref 6–12)
POTASSIUM SERPL-SCNC: 4.1 MMOL/L (ref 3.5–5.2)
PROT SERPL-MCNC: 6.9 G/DL (ref 6–8.5)
PROTHROMBIN TIME: 13.2 SECONDS (ref 11.4–14.4)
RBC # BLD AUTO: 3.97 10*6/MM3 (ref 4.14–5.8)
RH BLD: POSITIVE
SARS-COV-2 RNA RESP QL NAA+PROBE: NOT DETECTED
SODIUM SERPL-SCNC: 137 MMOL/L (ref 136–145)
T&S EXPIRATION DATE: NORMAL
WBC NRBC COR # BLD: 4.3 10*3/MM3 (ref 3.4–10.8)

## 2022-02-21 PROCEDURE — 85025 COMPLETE CBC W/AUTO DIFF WBC: CPT

## 2022-02-21 PROCEDURE — 94010 BREATHING CAPACITY TEST: CPT | Performed by: INTERNAL MEDICINE

## 2022-02-21 PROCEDURE — 80076 HEPATIC FUNCTION PANEL: CPT

## 2022-02-21 PROCEDURE — 83036 HEMOGLOBIN GLYCOSYLATED A1C: CPT

## 2022-02-21 PROCEDURE — 86901 BLOOD TYPING SEROLOGIC RH(D): CPT

## 2022-02-21 PROCEDURE — 87636 SARSCOV2 & INF A&B AMP PRB: CPT

## 2022-02-21 PROCEDURE — 80048 BASIC METABOLIC PNL TOTAL CA: CPT

## 2022-02-21 PROCEDURE — 86850 RBC ANTIBODY SCREEN: CPT

## 2022-02-21 PROCEDURE — 86923 COMPATIBILITY TEST ELECTRIC: CPT

## 2022-02-21 PROCEDURE — 71046 X-RAY EXAM CHEST 2 VIEWS: CPT

## 2022-02-21 PROCEDURE — C9803 HOPD COVID-19 SPEC COLLECT: HCPCS

## 2022-02-21 PROCEDURE — 94010 BREATHING CAPACITY TEST: CPT

## 2022-02-21 PROCEDURE — 86900 BLOOD TYPING SEROLOGIC ABO: CPT

## 2022-02-21 PROCEDURE — 85610 PROTHROMBIN TIME: CPT

## 2022-02-21 PROCEDURE — 36415 COLL VENOUS BLD VENIPUNCTURE: CPT

## 2022-02-21 RX ORDER — CHLORHEXIDINE GLUCONATE 500 MG/1
1 CLOTH TOPICAL EVERY 12 HOURS PRN
Status: DISCONTINUED | OUTPATIENT
Start: 2022-02-21 | End: 2022-02-24

## 2022-02-21 RX ORDER — OXCARBAZEPINE 300 MG/1
TABLET, FILM COATED ORAL
Status: ON HOLD | COMMUNITY
End: 2022-02-24

## 2022-02-21 NOTE — PAT
An arrival time for procedure was not given during PAT visit. If patient had any questions or concerns about their arrival time, they were instructed to contact their surgeon/physician.  Additionally, if the patient referred to an arrival time that was acquired from their my chart account, patient was encouraged to verify that time with their surgeon/physician.  NO arrival times given in Pre Admission Testing Department.    Patient to apply Chlorhexadine wipes  to surgical area (as instructed) the night before procedure and the AM of procedure. Wipes provided.    Blood bank bracelet applied to patient during Pre Admission Testing visit.  Patient instructed not to remove from arm until after procedure and they are discharged from the hospital.  Explained to patient that they may shower and get the bracelet wet, but not to immerse under water for longer periods (bathing, swimming, hand dishwashing, etc).  Patient verbalized understanding.    BACTROBAN given to patient with instructions to apply in nostrils the evening before surgery. Pt verbalized understanding.    Patient directed to Radiology Department for CXR after Pre Admission Testing Appointment. Patient also directed to go directly to 4th floor for PFT immediately after CXR completed, verbalized understanding.    COVID (Jodi) test performed in PAT.    EKG from 1/30/22 in [facility] and on chart.

## 2022-02-22 ENCOUNTER — ANESTHESIA EVENT (OUTPATIENT)
Dept: PERIOP | Facility: HOSPITAL | Age: 42
End: 2022-02-22

## 2022-02-22 RX ORDER — FAMOTIDINE 10 MG/ML
20 INJECTION, SOLUTION INTRAVENOUS ONCE
Status: CANCELLED | OUTPATIENT
Start: 2022-02-22 | End: 2022-02-22

## 2022-02-23 ENCOUNTER — ANESTHESIA (OUTPATIENT)
Dept: PERIOP | Facility: HOSPITAL | Age: 42
End: 2022-02-23

## 2022-02-23 ENCOUNTER — APPOINTMENT (OUTPATIENT)
Dept: GENERAL RADIOLOGY | Facility: HOSPITAL | Age: 42
End: 2022-02-23

## 2022-02-23 ENCOUNTER — ANESTHESIA EVENT CONVERTED (OUTPATIENT)
Dept: ANESTHESIOLOGY | Facility: HOSPITAL | Age: 42
End: 2022-02-23

## 2022-02-23 ENCOUNTER — HOSPITAL ENCOUNTER (INPATIENT)
Facility: HOSPITAL | Age: 42
LOS: 5 days | Discharge: HOME HEALTH | End: 2022-02-28
Attending: THORACIC SURGERY (CARDIOTHORACIC VASCULAR SURGERY) | Admitting: THORACIC SURGERY (CARDIOTHORACIC VASCULAR SURGERY)

## 2022-02-23 DIAGNOSIS — C37 THYMIC CARCINOMA: ICD-10-CM

## 2022-02-23 DIAGNOSIS — J98.59 MEDIASTINAL MASS: ICD-10-CM

## 2022-02-23 DIAGNOSIS — D49.89 THYMOMA: Primary | ICD-10-CM

## 2022-02-23 PROBLEM — F10.90 ALCOHOL USE: Chronic | Status: ACTIVE | Noted: 2022-02-23

## 2022-02-23 PROBLEM — F10.90 ALCOHOL USE: Status: ACTIVE | Noted: 2022-02-23

## 2022-02-23 PROBLEM — Z78.9 ALCOHOL USE: Chronic | Status: ACTIVE | Noted: 2022-02-23

## 2022-02-23 PROBLEM — Z87.898 HISTORY OF SEIZURES: Chronic | Status: ACTIVE | Noted: 2022-01-29

## 2022-02-23 PROBLEM — Z78.9 ELECTRONIC CIGARETTE USE: Chronic | Status: ACTIVE | Noted: 2022-02-23

## 2022-02-23 PROBLEM — Z78.9 ALCOHOL USE: Status: ACTIVE | Noted: 2022-02-23

## 2022-02-23 PROBLEM — E66.813 CLASS 3 SEVERE OBESITY IN ADULT: Chronic | Status: ACTIVE | Noted: 2021-12-10

## 2022-02-23 PROBLEM — Z78.9 ELECTRONIC CIGARETTE USE: Status: ACTIVE | Noted: 2022-02-23

## 2022-02-23 PROBLEM — E66.01 CLASS 3 SEVERE OBESITY IN ADULT (HCC): Chronic | Status: ACTIVE | Noted: 2021-12-10

## 2022-02-23 LAB
ABO GROUP BLD: NORMAL
ANION GAP SERPL CALCULATED.3IONS-SCNC: 11 MMOL/L (ref 5–15)
BUN SERPL-MCNC: 15 MG/DL (ref 6–20)
BUN/CREAT SERPL: 15.5 (ref 7–25)
CALCIUM SPEC-SCNC: 8.7 MG/DL (ref 8.6–10.5)
CHLORIDE SERPL-SCNC: 98 MMOL/L (ref 98–107)
CO2 SERPL-SCNC: 22 MMOL/L (ref 22–29)
CREAT SERPL-MCNC: 0.97 MG/DL (ref 0.76–1.27)
DEPRECATED RDW RBC AUTO: 43.3 FL (ref 37–54)
ERYTHROCYTE [DISTWIDTH] IN BLOOD BY AUTOMATED COUNT: 12.9 % (ref 12.3–15.4)
GFR SERPL CREATININE-BSD FRML MDRD: 104 ML/MIN/1.73
GLUCOSE SERPL-MCNC: 168 MG/DL (ref 65–99)
HCT VFR BLD AUTO: 41.1 % (ref 37.5–51)
HGB BLD-MCNC: 13.7 G/DL (ref 13–17.7)
MAGNESIUM SERPL-MCNC: 1.9 MG/DL (ref 1.6–2.6)
MCH RBC QN AUTO: 30.4 PG (ref 26.6–33)
MCHC RBC AUTO-ENTMCNC: 33.3 G/DL (ref 31.5–35.7)
MCV RBC AUTO: 91.3 FL (ref 79–97)
PLATELET # BLD AUTO: 315 10*3/MM3 (ref 140–450)
PMV BLD AUTO: 9.2 FL (ref 6–12)
POTASSIUM SERPL-SCNC: 5.2 MMOL/L (ref 3.5–5.2)
RBC # BLD AUTO: 4.5 10*6/MM3 (ref 4.14–5.8)
RH BLD: POSITIVE
SODIUM SERPL-SCNC: 131 MMOL/L (ref 136–145)
WBC NRBC COR # BLD: 15.01 10*3/MM3 (ref 3.4–10.8)

## 2022-02-23 PROCEDURE — 88184 FLOWCYTOMETRY/ TC 1 MARKER: CPT

## 2022-02-23 PROCEDURE — 25010000002 ONDANSETRON PER 1 MG: Performed by: NURSE ANESTHETIST, CERTIFIED REGISTERED

## 2022-02-23 PROCEDURE — 88185 FLOWCYTOMETRY/TC ADD-ON: CPT

## 2022-02-23 PROCEDURE — 0 CEFUROXIME SODIUM 1.5 G RECONSTITUTED SOLUTION

## 2022-02-23 PROCEDURE — 25010000002 HYDROMORPHONE PER 4 MG: Performed by: NURSE ANESTHETIST, CERTIFIED REGISTERED

## 2022-02-23 PROCEDURE — 88342 IMHCHEM/IMCYTCHM 1ST ANTB: CPT | Performed by: THORACIC SURGERY (CARDIOTHORACIC VASCULAR SURGERY)

## 2022-02-23 PROCEDURE — 0 CEFUROXIME SODIUM 1.5 G RECONSTITUTED SOLUTION: Performed by: PHYSICIAN ASSISTANT

## 2022-02-23 PROCEDURE — 86901 BLOOD TYPING SEROLOGIC RH(D): CPT

## 2022-02-23 PROCEDURE — 71045 X-RAY EXAM CHEST 1 VIEW: CPT

## 2022-02-23 PROCEDURE — 88307 TISSUE EXAM BY PATHOLOGIST: CPT | Performed by: THORACIC SURGERY (CARDIOTHORACIC VASCULAR SURGERY)

## 2022-02-23 PROCEDURE — 76000 FLUOROSCOPY <1 HR PHYS/QHP: CPT

## 2022-02-23 PROCEDURE — 25010000002 HYDROMORPHONE 1 MG/ML SOLUTION

## 2022-02-23 PROCEDURE — 83735 ASSAY OF MAGNESIUM: CPT | Performed by: THORACIC SURGERY (CARDIOTHORACIC VASCULAR SURGERY)

## 2022-02-23 PROCEDURE — 88360 TUMOR IMMUNOHISTOCHEM/MANUAL: CPT | Performed by: THORACIC SURGERY (CARDIOTHORACIC VASCULAR SURGERY)

## 2022-02-23 PROCEDURE — 25010000002 MIDAZOLAM PER 1 MG: Performed by: NURSE ANESTHETIST, CERTIFIED REGISTERED

## 2022-02-23 PROCEDURE — 25010000002 PROPOFOL 10 MG/ML EMULSION: Performed by: NURSE ANESTHETIST, CERTIFIED REGISTERED

## 2022-02-23 PROCEDURE — 88305 TISSUE EXAM BY PATHOLOGIST: CPT | Performed by: THORACIC SURGERY (CARDIOTHORACIC VASCULAR SURGERY)

## 2022-02-23 PROCEDURE — 85027 COMPLETE CBC AUTOMATED: CPT | Performed by: THORACIC SURGERY (CARDIOTHORACIC VASCULAR SURGERY)

## 2022-02-23 PROCEDURE — 88331 PATH CONSLTJ SURG 1 BLK 1SPC: CPT | Performed by: PATHOLOGY

## 2022-02-23 PROCEDURE — 88341 IMHCHEM/IMCYTCHM EA ADD ANTB: CPT | Performed by: THORACIC SURGERY (CARDIOTHORACIC VASCULAR SURGERY)

## 2022-02-23 PROCEDURE — 07TM0ZZ RESECTION OF THYMUS, OPEN APPROACH: ICD-10-PCS | Performed by: THORACIC SURGERY (CARDIOTHORACIC VASCULAR SURGERY)

## 2022-02-23 PROCEDURE — C1751 CATH, INF, PER/CENT/MIDLINE: HCPCS | Performed by: ANESTHESIOLOGY

## 2022-02-23 PROCEDURE — 25010000002 MORPHINE PER 10 MG: Performed by: THORACIC SURGERY (CARDIOTHORACIC VASCULAR SURGERY)

## 2022-02-23 PROCEDURE — 99233 SBSQ HOSP IP/OBS HIGH 50: CPT | Performed by: INTERNAL MEDICINE

## 2022-02-23 PROCEDURE — 25010000002 FENTANYL CITRATE (PF) 50 MCG/ML SOLUTION: Performed by: NURSE ANESTHETIST, CERTIFIED REGISTERED

## 2022-02-23 PROCEDURE — 86900 BLOOD TYPING SEROLOGIC ABO: CPT

## 2022-02-23 PROCEDURE — 25010000002 DEXAMETHASONE PER 1 MG: Performed by: NURSE ANESTHETIST, CERTIFIED REGISTERED

## 2022-02-23 PROCEDURE — 60522 REMOVAL OF THYMUS GLAND: CPT | Performed by: THORACIC SURGERY (CARDIOTHORACIC VASCULAR SURGERY)

## 2022-02-23 PROCEDURE — 80048 BASIC METABOLIC PNL TOTAL CA: CPT | Performed by: THORACIC SURGERY (CARDIOTHORACIC VASCULAR SURGERY)

## 2022-02-23 RX ORDER — HYDROMORPHONE HYDROCHLORIDE 1 MG/ML
0.5 INJECTION, SOLUTION INTRAMUSCULAR; INTRAVENOUS; SUBCUTANEOUS
Status: DISCONTINUED | OUTPATIENT
Start: 2022-02-23 | End: 2022-02-23 | Stop reason: HOSPADM

## 2022-02-23 RX ORDER — SODIUM CHLORIDE 0.9 % (FLUSH) 0.9 %
3-10 SYRINGE (ML) INJECTION AS NEEDED
Status: DISCONTINUED | OUTPATIENT
Start: 2022-02-23 | End: 2022-02-23 | Stop reason: HOSPADM

## 2022-02-23 RX ORDER — SODIUM CHLORIDE 9 MG/ML
INJECTION, SOLUTION INTRAVENOUS CONTINUOUS PRN
Status: DISCONTINUED | OUTPATIENT
Start: 2022-02-23 | End: 2022-02-23 | Stop reason: SURG

## 2022-02-23 RX ORDER — MEPERIDINE HYDROCHLORIDE 25 MG/ML
12.5 INJECTION INTRAMUSCULAR; INTRAVENOUS; SUBCUTANEOUS
Status: DISCONTINUED | OUTPATIENT
Start: 2022-02-23 | End: 2022-02-23 | Stop reason: HOSPADM

## 2022-02-23 RX ORDER — PROMETHAZINE HYDROCHLORIDE 25 MG/1
25 TABLET ORAL ONCE AS NEEDED
Status: DISCONTINUED | OUTPATIENT
Start: 2022-02-23 | End: 2022-02-23 | Stop reason: HOSPADM

## 2022-02-23 RX ORDER — GABAPENTIN 100 MG/1
100 CAPSULE ORAL 3 TIMES DAILY
Status: DISCONTINUED | OUTPATIENT
Start: 2022-02-23 | End: 2022-02-23

## 2022-02-23 RX ORDER — ASPIRIN 81 MG/1
81 TABLET, CHEWABLE ORAL DAILY
Status: DISCONTINUED | OUTPATIENT
Start: 2022-02-24 | End: 2022-02-28 | Stop reason: HOSPADM

## 2022-02-23 RX ORDER — OXYCODONE HYDROCHLORIDE 5 MG/1
10 TABLET ORAL EVERY 6 HOURS PRN
Status: DISCONTINUED | OUTPATIENT
Start: 2022-02-23 | End: 2022-02-24

## 2022-02-23 RX ORDER — MAGNESIUM HYDROXIDE 1200 MG/15ML
LIQUID ORAL AS NEEDED
Status: DISCONTINUED | OUTPATIENT
Start: 2022-02-23 | End: 2022-02-23 | Stop reason: HOSPADM

## 2022-02-23 RX ORDER — HYDROCODONE BITARTRATE AND ACETAMINOPHEN 5; 325 MG/1; MG/1
1 TABLET ORAL ONCE AS NEEDED
Status: DISCONTINUED | OUTPATIENT
Start: 2022-02-23 | End: 2022-02-23 | Stop reason: HOSPADM

## 2022-02-23 RX ORDER — ONDANSETRON 2 MG/ML
4 INJECTION INTRAMUSCULAR; INTRAVENOUS EVERY 6 HOURS PRN
Status: DISCONTINUED | OUTPATIENT
Start: 2022-02-23 | End: 2022-02-28 | Stop reason: HOSPADM

## 2022-02-23 RX ORDER — SODIUM CHLORIDE 9 MG/ML
30 INJECTION, SOLUTION INTRAVENOUS CONTINUOUS
Status: DISCONTINUED | OUTPATIENT
Start: 2022-02-23 | End: 2022-02-24

## 2022-02-23 RX ORDER — MORPHINE SULFATE 4 MG/ML
2 INJECTION, SOLUTION INTRAMUSCULAR; INTRAVENOUS
Status: DISCONTINUED | OUTPATIENT
Start: 2022-02-23 | End: 2022-02-24

## 2022-02-23 RX ORDER — ONDANSETRON 4 MG/1
4 TABLET, FILM COATED ORAL EVERY 6 HOURS PRN
Status: DISCONTINUED | OUTPATIENT
Start: 2022-02-23 | End: 2022-02-28 | Stop reason: HOSPADM

## 2022-02-23 RX ORDER — MIDAZOLAM HYDROCHLORIDE 1 MG/ML
1 INJECTION INTRAMUSCULAR; INTRAVENOUS
Status: DISCONTINUED | OUTPATIENT
Start: 2022-02-23 | End: 2022-02-23 | Stop reason: HOSPADM

## 2022-02-23 RX ORDER — SODIUM CHLORIDE, SODIUM LACTATE, POTASSIUM CHLORIDE, CALCIUM CHLORIDE 600; 310; 30; 20 MG/100ML; MG/100ML; MG/100ML; MG/100ML
9 INJECTION, SOLUTION INTRAVENOUS CONTINUOUS
Status: DISCONTINUED | OUTPATIENT
Start: 2022-02-23 | End: 2022-02-23

## 2022-02-23 RX ORDER — ONDANSETRON 2 MG/ML
4 INJECTION INTRAMUSCULAR; INTRAVENOUS ONCE AS NEEDED
Status: DISCONTINUED | OUTPATIENT
Start: 2022-02-23 | End: 2022-02-23 | Stop reason: HOSPADM

## 2022-02-23 RX ORDER — SODIUM CHLORIDE 0.9 % (FLUSH) 0.9 %
10 SYRINGE (ML) INJECTION EVERY 12 HOURS SCHEDULED
Status: DISCONTINUED | OUTPATIENT
Start: 2022-02-23 | End: 2022-02-23 | Stop reason: HOSPADM

## 2022-02-23 RX ORDER — SODIUM CHLORIDE 0.9 % (FLUSH) 0.9 %
3 SYRINGE (ML) INJECTION EVERY 12 HOURS SCHEDULED
Status: DISCONTINUED | OUTPATIENT
Start: 2022-02-23 | End: 2022-02-23 | Stop reason: HOSPADM

## 2022-02-23 RX ORDER — CYCLOBENZAPRINE HCL 10 MG
10 TABLET ORAL 3 TIMES DAILY PRN
Status: DISCONTINUED | OUTPATIENT
Start: 2022-02-23 | End: 2022-02-28 | Stop reason: HOSPADM

## 2022-02-23 RX ORDER — MIDAZOLAM HYDROCHLORIDE 1 MG/ML
INJECTION INTRAMUSCULAR; INTRAVENOUS AS NEEDED
Status: DISCONTINUED | OUTPATIENT
Start: 2022-02-23 | End: 2022-02-23 | Stop reason: SURG

## 2022-02-23 RX ORDER — ROCURONIUM BROMIDE 10 MG/ML
INJECTION, SOLUTION INTRAVENOUS AS NEEDED
Status: DISCONTINUED | OUTPATIENT
Start: 2022-02-23 | End: 2022-02-23 | Stop reason: SURG

## 2022-02-23 RX ORDER — DROPERIDOL 2.5 MG/ML
0.62 INJECTION, SOLUTION INTRAMUSCULAR; INTRAVENOUS AS NEEDED
Status: DISCONTINUED | OUTPATIENT
Start: 2022-02-23 | End: 2022-02-23 | Stop reason: HOSPADM

## 2022-02-23 RX ORDER — FENTANYL CITRATE 50 UG/ML
50 INJECTION, SOLUTION INTRAMUSCULAR; INTRAVENOUS
Status: DISCONTINUED | OUTPATIENT
Start: 2022-02-23 | End: 2022-02-23 | Stop reason: HOSPADM

## 2022-02-23 RX ORDER — FAMOTIDINE 20 MG/1
20 TABLET, FILM COATED ORAL ONCE
Status: COMPLETED | OUTPATIENT
Start: 2022-02-23 | End: 2022-02-23

## 2022-02-23 RX ORDER — HYDROCODONE BITARTRATE AND ACETAMINOPHEN 7.5; 325 MG/1; MG/1
1 TABLET ORAL EVERY 6 HOURS PRN
Status: DISCONTINUED | OUTPATIENT
Start: 2022-02-23 | End: 2022-02-23

## 2022-02-23 RX ORDER — FAMOTIDINE 20 MG/1
20 TABLET, FILM COATED ORAL
Status: DISCONTINUED | OUTPATIENT
Start: 2022-02-24 | End: 2022-02-28 | Stop reason: HOSPADM

## 2022-02-23 RX ORDER — LIDOCAINE HYDROCHLORIDE 10 MG/ML
0.5 INJECTION, SOLUTION EPIDURAL; INFILTRATION; INTRACAUDAL; PERINEURAL ONCE AS NEEDED
Status: COMPLETED | OUTPATIENT
Start: 2022-02-23 | End: 2022-02-23

## 2022-02-23 RX ORDER — LIDOCAINE HYDROCHLORIDE 10 MG/ML
INJECTION, SOLUTION EPIDURAL; INFILTRATION; INTRACAUDAL; PERINEURAL AS NEEDED
Status: DISCONTINUED | OUTPATIENT
Start: 2022-02-23 | End: 2022-02-23 | Stop reason: SURG

## 2022-02-23 RX ORDER — DEXAMETHASONE SODIUM PHOSPHATE 4 MG/ML
INJECTION, SOLUTION INTRA-ARTICULAR; INTRALESIONAL; INTRAMUSCULAR; INTRAVENOUS; SOFT TISSUE AS NEEDED
Status: DISCONTINUED | OUTPATIENT
Start: 2022-02-23 | End: 2022-02-23 | Stop reason: SURG

## 2022-02-23 RX ORDER — PROMETHAZINE HYDROCHLORIDE 25 MG/1
25 SUPPOSITORY RECTAL ONCE AS NEEDED
Status: DISCONTINUED | OUTPATIENT
Start: 2022-02-23 | End: 2022-02-23 | Stop reason: HOSPADM

## 2022-02-23 RX ORDER — IPRATROPIUM BROMIDE AND ALBUTEROL SULFATE 2.5; .5 MG/3ML; MG/3ML
3 SOLUTION RESPIRATORY (INHALATION) ONCE AS NEEDED
Status: DISCONTINUED | OUTPATIENT
Start: 2022-02-23 | End: 2022-02-23 | Stop reason: HOSPADM

## 2022-02-23 RX ORDER — NALOXONE HCL 0.4 MG/ML
0.4 VIAL (ML) INJECTION AS NEEDED
Status: DISCONTINUED | OUTPATIENT
Start: 2022-02-23 | End: 2022-02-23 | Stop reason: HOSPADM

## 2022-02-23 RX ORDER — OXCARBAZEPINE 300 MG/1
1200 TABLET, FILM COATED ORAL 2 TIMES DAILY
Status: DISCONTINUED | OUTPATIENT
Start: 2022-02-23 | End: 2022-02-28 | Stop reason: HOSPADM

## 2022-02-23 RX ORDER — PROPOFOL 10 MG/ML
VIAL (ML) INTRAVENOUS AS NEEDED
Status: DISCONTINUED | OUTPATIENT
Start: 2022-02-23 | End: 2022-02-23 | Stop reason: SURG

## 2022-02-23 RX ORDER — FENTANYL CITRATE 50 UG/ML
INJECTION, SOLUTION INTRAMUSCULAR; INTRAVENOUS AS NEEDED
Status: DISCONTINUED | OUTPATIENT
Start: 2022-02-23 | End: 2022-02-23 | Stop reason: SURG

## 2022-02-23 RX ORDER — LABETALOL HYDROCHLORIDE 5 MG/ML
5 INJECTION, SOLUTION INTRAVENOUS
Status: DISCONTINUED | OUTPATIENT
Start: 2022-02-23 | End: 2022-02-23 | Stop reason: HOSPADM

## 2022-02-23 RX ORDER — ACETAMINOPHEN 500 MG
1000 TABLET ORAL EVERY 8 HOURS SCHEDULED
Status: DISCONTINUED | OUTPATIENT
Start: 2022-02-23 | End: 2022-02-28 | Stop reason: HOSPADM

## 2022-02-23 RX ORDER — ONDANSETRON 2 MG/ML
INJECTION INTRAMUSCULAR; INTRAVENOUS AS NEEDED
Status: DISCONTINUED | OUTPATIENT
Start: 2022-02-23 | End: 2022-02-23 | Stop reason: SURG

## 2022-02-23 RX ORDER — SODIUM CHLORIDE 0.9 % (FLUSH) 0.9 %
10 SYRINGE (ML) INJECTION AS NEEDED
Status: DISCONTINUED | OUTPATIENT
Start: 2022-02-23 | End: 2022-02-23 | Stop reason: HOSPADM

## 2022-02-23 RX ORDER — DROPERIDOL 2.5 MG/ML
0.62 INJECTION, SOLUTION INTRAMUSCULAR; INTRAVENOUS ONCE AS NEEDED
Status: DISCONTINUED | OUTPATIENT
Start: 2022-02-23 | End: 2022-02-23 | Stop reason: HOSPADM

## 2022-02-23 RX ADMIN — OXYCODONE HYDROCHLORIDE 10 MG: 5 TABLET ORAL at 23:06

## 2022-02-23 RX ADMIN — HYDROMORPHONE HYDROCHLORIDE 0.5 MG: 1 INJECTION, SOLUTION INTRAMUSCULAR; INTRAVENOUS; SUBCUTANEOUS at 14:43

## 2022-02-23 RX ADMIN — PROPOFOL 300 MG: 10 INJECTION, EMULSION INTRAVENOUS at 10:17

## 2022-02-23 RX ADMIN — NICARDIPINE HYDROCHLORIDE 10 MG/HR: 0.1 INJECTION, SOLUTION INTRAVENOUS at 11:04

## 2022-02-23 RX ADMIN — ROCURONIUM BROMIDE 40 MG: 10 INJECTION, SOLUTION INTRAVENOUS at 10:42

## 2022-02-23 RX ADMIN — CYCLOBENZAPRINE 10 MG: 10 TABLET, FILM COATED ORAL at 17:20

## 2022-02-23 RX ADMIN — OXYCODONE HYDROCHLORIDE 10 MG: 5 TABLET ORAL at 16:47

## 2022-02-23 RX ADMIN — ACETAMINOPHEN 1000 MG: 500 TABLET, FILM COATED ORAL at 16:41

## 2022-02-23 RX ADMIN — METOPROLOL TARTRATE 5 MG: 5 INJECTION INTRAVENOUS at 11:14

## 2022-02-23 RX ADMIN — FENTANYL CITRATE 150 MCG: 50 INJECTION, SOLUTION INTRAMUSCULAR; INTRAVENOUS at 10:32

## 2022-02-23 RX ADMIN — SODIUM CHLORIDE 30 ML/HR: 9 INJECTION, SOLUTION INTRAVENOUS at 16:31

## 2022-02-23 RX ADMIN — FENTANYL CITRATE 100 MCG: 50 INJECTION, SOLUTION INTRAMUSCULAR; INTRAVENOUS at 10:17

## 2022-02-23 RX ADMIN — LIDOCAINE HYDROCHLORIDE 50 MG: 10 INJECTION, SOLUTION EPIDURAL; INFILTRATION; INTRACAUDAL; PERINEURAL at 10:17

## 2022-02-23 RX ADMIN — FENTANYL CITRATE 50 MCG: 50 INJECTION, SOLUTION INTRAMUSCULAR; INTRAVENOUS at 12:37

## 2022-02-23 RX ADMIN — SODIUM CHLORIDE, POTASSIUM CHLORIDE, SODIUM LACTATE AND CALCIUM CHLORIDE 9 ML/HR: 600; 310; 30; 20 INJECTION, SOLUTION INTRAVENOUS at 09:04

## 2022-02-23 RX ADMIN — FAMOTIDINE 20 MG: 20 TABLET ORAL at 09:04

## 2022-02-23 RX ADMIN — LIDOCAINE HYDROCHLORIDE 0.5 ML: 10 INJECTION, SOLUTION EPIDURAL; INFILTRATION; INTRACAUDAL; PERINEURAL at 09:04

## 2022-02-23 RX ADMIN — DEXAMETHASONE SODIUM PHOSPHATE 8 MG: 4 INJECTION, SOLUTION INTRA-ARTICULAR; INTRALESIONAL; INTRAMUSCULAR; INTRAVENOUS; SOFT TISSUE at 10:28

## 2022-02-23 RX ADMIN — OXCARBAZEPINE 1200 MG: 300 TABLET, FILM COATED ORAL at 21:55

## 2022-02-23 RX ADMIN — HYDROMORPHONE HYDROCHLORIDE 0.5 MG: 1 INJECTION, SOLUTION INTRAMUSCULAR; INTRAVENOUS; SUBCUTANEOUS at 14:59

## 2022-02-23 RX ADMIN — FENTANYL CITRATE 100 MCG: 50 INJECTION, SOLUTION INTRAMUSCULAR; INTRAVENOUS at 10:59

## 2022-02-23 RX ADMIN — ONDANSETRON 4 MG: 2 INJECTION INTRAMUSCULAR; INTRAVENOUS at 12:50

## 2022-02-23 RX ADMIN — MIDAZOLAM HYDROCHLORIDE 2 MG: 1 INJECTION, SOLUTION INTRAMUSCULAR; INTRAVENOUS at 10:17

## 2022-02-23 RX ADMIN — SUGAMMADEX 400 MG: 100 INJECTION, SOLUTION INTRAVENOUS at 13:58

## 2022-02-23 RX ADMIN — MORPHINE SULFATE 2 MG: 4 INJECTION, SOLUTION INTRAMUSCULAR; INTRAVENOUS at 20:19

## 2022-02-23 RX ADMIN — SODIUM CHLORIDE 1.5 G: 900 INJECTION INTRAVENOUS at 16:46

## 2022-02-23 RX ADMIN — ROCURONIUM BROMIDE 60 MG: 10 INJECTION, SOLUTION INTRAVENOUS at 10:17

## 2022-02-23 RX ADMIN — FENTANYL CITRATE 100 MCG: 50 INJECTION, SOLUTION INTRAMUSCULAR; INTRAVENOUS at 14:18

## 2022-02-23 RX ADMIN — MORPHINE SULFATE 2 MG: 4 INJECTION, SOLUTION INTRAMUSCULAR; INTRAVENOUS at 16:42

## 2022-02-23 RX ADMIN — MORPHINE SULFATE 2 MG: 4 INJECTION, SOLUTION INTRAMUSCULAR; INTRAVENOUS at 18:54

## 2022-02-23 RX ADMIN — FENTANYL CITRATE 100 MCG: 50 INJECTION, SOLUTION INTRAMUSCULAR; INTRAVENOUS at 11:08

## 2022-02-23 RX ADMIN — SODIUM CHLORIDE: 9 INJECTION, SOLUTION INTRAVENOUS at 10:26

## 2022-02-23 NOTE — ANESTHESIA PROCEDURE NOTES
Airway  Urgency: elective    Date/Time: 2/23/2022 10:18 AM  Airway not difficult    General Information and Staff    Patient location during procedure: OR  CRNA: Margo Perez CRNA    Indications and Patient Condition  Indications for airway management: airway protection    Preoxygenated: yes  MILS not maintained throughout  Mask difficulty assessment: 2 - vent by mask + OA or adjuvant +/- NMBA    Final Airway Details  Final airway type: endotracheal airway      Successful airway: ETT (Taperguard Evac)  Cuffed: yes   Successful intubation technique: video laryngoscopy  Facilitating devices/methods: intubating stylet  Endotracheal tube insertion site: oral  Blade: Stark  Blade size: 4  ETT size (mm): 8.0  Cormack-Lehane Classification: grade IIa - partial view of glottis  Placement verified by: chest auscultation and capnometry   Measured from: lips  ETT/EBT  to lips (cm): 23  Number of attempts at approach: 1  Assessment: lips, teeth, and gum same as pre-op and atraumatic intubation    Additional Comments  Negative epigastric sounds, Breath sound equal bilaterally with symmetric chest rise and fall

## 2022-02-23 NOTE — ANESTHESIA PROCEDURE NOTES
Central Line      Patient reassessed immediately prior to procedure    Patient location during procedure: OR  Indications: vascular access  Staff  Anesthesiologist: Miley Corbin MD  Preanesthetic Checklist  Completed: patient identified, IV checked, site marked, risks and benefits discussed, surgical consent, monitors and equipment checked, pre-op evaluation and timeout performed  Central Line Prep  Sterile Tech:cap, gloves, gown, mask and sterile barriers  Prep: chloraprep  Patient monitoring: blood pressure monitoring, continuous pulse oximetry and EKG  Central Line Procedure  Laterality:right  Location:internal jugular  Catheter Type:MAC  Catheter Size:9 Fr  Guidance:ultrasound guided  PROCEDURE NOTE/ULTRASOUND INTERPRETATION.  Using ultrasound guidance the potential vascular sites for insertion of the catheter were visualized to determine the patency of the vessel to be used for vascular access.  After selecting the appropriate site for insertion, the needle was visualized under ultrasound being inserted into the internal jugular vein, followed by ultrasound confirmation of wire and catheter placement. There were no abnormalities seen on ultrasound; an image was taken; and the patient tolerated the procedure with no complications. Images: still images obtained, printed/placed on chart  Assessment  Post procedure:biopatch applied, line sutured, occlusive dressing applied and secured with tape  Assessement:blood return through all ports, free fluid flow, chest x-ray ordered and Timothy Test  Complications:no  Patient Tolerance:patient tolerated the procedure well with no apparent complications

## 2022-02-23 NOTE — ANESTHESIA POSTPROCEDURE EVALUATION
Patient: Jose Hardin    Procedure Summary     Date: 02/23/22 Room / Location:  [facility]  /  [facility]    Anesthesia Start: 1013 Anesthesia Stop: 1429    Procedure: STERNOTOMY WITH THYMECTOMY (N/A Chest) Diagnosis:       Mediastinal mass      (Mediastinal mass [J98.59])    Surgeons: Jose Fu MD Provider: Miley Corbin MD    Anesthesia Type: general ASA Status: 3          Anesthesia Type: general    Vitals  Vitals Value Taken Time   /77 02/23/22 1429   Temp 97 °F (36.1 °C) 02/23/22 1429   Pulse 97 02/23/22 1429   Resp 22 02/23/22 1429   SpO2 95 % 02/23/22 1429           Post Anesthesia Care and Evaluation    Patient location during evaluation: PACU  Patient participation: complete - patient participated  Level of consciousness: awake and alert  Pain management: adequate  Airway patency: patent  Anesthetic complications: No anesthetic complications  PONV Status: none  Cardiovascular status: hemodynamically stable and acceptable  Respiratory status: nonlabored ventilation, acceptable and nasal cannula  Hydration status: acceptable

## 2022-02-23 NOTE — ANESTHESIA PROCEDURE NOTES
Arterial Line      Patient reassessed immediately prior to procedure    Patient location during procedure: OR  Start time: 2/23/2022 10:10 AM   Line placed for hemodynamic monitoring, ABGs/Labs/ISTAT and MD/Surgeon request.  Performed By   Anesthesiologist: Miley Corbin MD  Preanesthetic Checklist  Completed: patient identified, IV checked, site marked, risks and benefits discussed, surgical consent, monitors and equipment checked, pre-op evaluation and timeout performed  Arterial Line Prep   Sterile Tech: cap, gloves, mask and sterile barriers  Prep: ChloraPrep  Patient monitoring: blood pressure monitoring, continuous pulse oximetry and EKG  Arterial Line Procedure   Laterality:right  Location:  radial artery  Catheter size: 20 G   Guidance: landmark technique and palpation technique  Number of attempts: 1  Successful placement: yes  Post Assessment   Dressing Type: biopatch applied, line sutured, occlusive dressing applied, secured with tape and wrist guard applied.   Complications no  Circ/Move/Sens Assessment: normal.   Patient Tolerance: patient tolerated the procedure well with no apparent complications

## 2022-02-23 NOTE — ADDENDUM NOTE
Addendum  created 02/23/22 1526 by Shagufta Mcgee CRNA    Child order released for a procedure order, Clinical Note Signed, Intraprocedure Blocks edited, Pend clinical note

## 2022-02-24 ENCOUNTER — APPOINTMENT (OUTPATIENT)
Dept: GENERAL RADIOLOGY | Facility: HOSPITAL | Age: 42
End: 2022-02-24

## 2022-02-24 LAB
ANION GAP SERPL CALCULATED.3IONS-SCNC: 9 MMOL/L (ref 5–15)
BASOPHILS # BLD AUTO: 0.02 10*3/MM3 (ref 0–0.2)
BASOPHILS NFR BLD AUTO: 0.2 % (ref 0–1.5)
BUN SERPL-MCNC: 12 MG/DL (ref 6–20)
BUN/CREAT SERPL: 15.4 (ref 7–25)
CALCIUM SPEC-SCNC: 8.5 MG/DL (ref 8.6–10.5)
CHLORIDE SERPL-SCNC: 99 MMOL/L (ref 98–107)
CO2 SERPL-SCNC: 25 MMOL/L (ref 22–29)
CREAT SERPL-MCNC: 0.78 MG/DL (ref 0.76–1.27)
DEPRECATED RDW RBC AUTO: 43.1 FL (ref 37–54)
EOSINOPHIL # BLD AUTO: 0 10*3/MM3 (ref 0–0.4)
EOSINOPHIL NFR BLD AUTO: 0 % (ref 0.3–6.2)
ERYTHROCYTE [DISTWIDTH] IN BLOOD BY AUTOMATED COUNT: 12.9 % (ref 12.3–15.4)
GFR SERPL CREATININE-BSD FRML MDRD: 134 ML/MIN/1.73
GLUCOSE SERPL-MCNC: 132 MG/DL (ref 65–99)
HCT VFR BLD AUTO: 36.1 % (ref 37.5–51)
HGB BLD-MCNC: 12.1 G/DL (ref 13–17.7)
IMM GRANULOCYTES # BLD AUTO: 0.04 10*3/MM3 (ref 0–0.05)
IMM GRANULOCYTES NFR BLD AUTO: 0.4 % (ref 0–0.5)
LYMPHOCYTES # BLD AUTO: 0.85 10*3/MM3 (ref 0.7–3.1)
LYMPHOCYTES NFR BLD AUTO: 7.4 % (ref 19.6–45.3)
MAGNESIUM SERPL-MCNC: 2 MG/DL (ref 1.6–2.6)
MCH RBC QN AUTO: 30.7 PG (ref 26.6–33)
MCHC RBC AUTO-ENTMCNC: 33.5 G/DL (ref 31.5–35.7)
MCV RBC AUTO: 91.6 FL (ref 79–97)
MONOCYTES # BLD AUTO: 1.5 10*3/MM3 (ref 0.1–0.9)
MONOCYTES NFR BLD AUTO: 13.1 % (ref 5–12)
NEUTROPHILS NFR BLD AUTO: 78.9 % (ref 42.7–76)
NEUTROPHILS NFR BLD AUTO: 9.01 10*3/MM3 (ref 1.7–7)
NRBC BLD AUTO-RTO: 0 /100 WBC (ref 0–0.2)
PHOSPHATE SERPL-MCNC: 3.3 MG/DL (ref 2.5–4.5)
PLATELET # BLD AUTO: 274 10*3/MM3 (ref 140–450)
PMV BLD AUTO: 9.6 FL (ref 6–12)
POTASSIUM SERPL-SCNC: 4.4 MMOL/L (ref 3.5–5.2)
RBC # BLD AUTO: 3.94 10*6/MM3 (ref 4.14–5.8)
SODIUM SERPL-SCNC: 133 MMOL/L (ref 136–145)
WBC NRBC COR # BLD: 11.42 10*3/MM3 (ref 3.4–10.8)

## 2022-02-24 PROCEDURE — 94799 UNLISTED PULMONARY SVC/PX: CPT

## 2022-02-24 PROCEDURE — 25010000002 MORPHINE PER 10 MG: Performed by: INTERNAL MEDICINE

## 2022-02-24 PROCEDURE — 80048 BASIC METABOLIC PNL TOTAL CA: CPT | Performed by: PHYSICIAN ASSISTANT

## 2022-02-24 PROCEDURE — 83735 ASSAY OF MAGNESIUM: CPT

## 2022-02-24 PROCEDURE — 99233 SBSQ HOSP IP/OBS HIGH 50: CPT | Performed by: INTERNAL MEDICINE

## 2022-02-24 PROCEDURE — 25010000002 KETOROLAC TROMETHAMINE PER 15 MG: Performed by: THORACIC SURGERY (CARDIOTHORACIC VASCULAR SURGERY)

## 2022-02-24 PROCEDURE — 25010000002 MORPHINE PER 10 MG: Performed by: THORACIC SURGERY (CARDIOTHORACIC VASCULAR SURGERY)

## 2022-02-24 PROCEDURE — 25010000002 KETOROLAC TROMETHAMINE PER 15 MG: Performed by: INTERNAL MEDICINE

## 2022-02-24 PROCEDURE — 99024 POSTOP FOLLOW-UP VISIT: CPT | Performed by: STUDENT IN AN ORGANIZED HEALTH CARE EDUCATION/TRAINING PROGRAM

## 2022-02-24 PROCEDURE — 0 CEFUROXIME SODIUM 1.5 G RECONSTITUTED SOLUTION: Performed by: PHYSICIAN ASSISTANT

## 2022-02-24 PROCEDURE — 71045 X-RAY EXAM CHEST 1 VIEW: CPT

## 2022-02-24 PROCEDURE — 85025 COMPLETE CBC W/AUTO DIFF WBC: CPT | Performed by: PHYSICIAN ASSISTANT

## 2022-02-24 PROCEDURE — 84100 ASSAY OF PHOSPHORUS: CPT

## 2022-02-24 RX ORDER — OXYCODONE HYDROCHLORIDE 5 MG/1
10 TABLET ORAL EVERY 4 HOURS PRN
Status: DISCONTINUED | OUTPATIENT
Start: 2022-02-24 | End: 2022-02-28 | Stop reason: HOSPADM

## 2022-02-24 RX ORDER — MORPHINE SULFATE 4 MG/ML
2 INJECTION, SOLUTION INTRAMUSCULAR; INTRAVENOUS
Status: DISCONTINUED | OUTPATIENT
Start: 2022-02-24 | End: 2022-02-28 | Stop reason: HOSPADM

## 2022-02-24 RX ORDER — NICOTINE 21 MG/24HR
1 PATCH, TRANSDERMAL 24 HOURS TRANSDERMAL
Status: DISCONTINUED | OUTPATIENT
Start: 2022-02-24 | End: 2022-02-28 | Stop reason: HOSPADM

## 2022-02-24 RX ORDER — AMOXICILLIN 250 MG
2 CAPSULE ORAL 2 TIMES DAILY
Status: DISCONTINUED | OUTPATIENT
Start: 2022-02-24 | End: 2022-02-28 | Stop reason: HOSPADM

## 2022-02-24 RX ORDER — BISACODYL 5 MG/1
5 TABLET, DELAYED RELEASE ORAL DAILY PRN
Status: DISCONTINUED | OUTPATIENT
Start: 2022-02-24 | End: 2022-02-28 | Stop reason: HOSPADM

## 2022-02-24 RX ORDER — SODIUM CHLORIDE, SODIUM LACTATE, POTASSIUM CHLORIDE, CALCIUM CHLORIDE 600; 310; 30; 20 MG/100ML; MG/100ML; MG/100ML; MG/100ML
100 INJECTION, SOLUTION INTRAVENOUS CONTINUOUS
Status: ACTIVE | OUTPATIENT
Start: 2022-02-24 | End: 2022-02-24

## 2022-02-24 RX ORDER — HYDRALAZINE HYDROCHLORIDE 10 MG/1
10 TABLET, FILM COATED ORAL ONCE
Status: COMPLETED | OUTPATIENT
Start: 2022-02-24 | End: 2022-02-24

## 2022-02-24 RX ORDER — BISACODYL 10 MG
10 SUPPOSITORY, RECTAL RECTAL DAILY PRN
Status: DISCONTINUED | OUTPATIENT
Start: 2022-02-24 | End: 2022-02-28 | Stop reason: HOSPADM

## 2022-02-24 RX ORDER — KETOROLAC TROMETHAMINE 30 MG/ML
30 INJECTION, SOLUTION INTRAMUSCULAR; INTRAVENOUS ONCE AS NEEDED
Status: COMPLETED | OUTPATIENT
Start: 2022-02-24 | End: 2022-02-24

## 2022-02-24 RX ORDER — KETOROLAC TROMETHAMINE 30 MG/ML
30 INJECTION, SOLUTION INTRAMUSCULAR; INTRAVENOUS EVERY 8 HOURS PRN
Status: DISCONTINUED | OUTPATIENT
Start: 2022-02-24 | End: 2022-02-25

## 2022-02-24 RX ORDER — POLYETHYLENE GLYCOL 3350 17 G/17G
17 POWDER, FOR SOLUTION ORAL DAILY PRN
Status: DISCONTINUED | OUTPATIENT
Start: 2022-02-24 | End: 2022-02-28 | Stop reason: HOSPADM

## 2022-02-24 RX ADMIN — OXYCODONE HYDROCHLORIDE 10 MG: 5 TABLET ORAL at 05:16

## 2022-02-24 RX ADMIN — MORPHINE SULFATE 2 MG: 4 INJECTION, SOLUTION INTRAMUSCULAR; INTRAVENOUS at 08:23

## 2022-02-24 RX ADMIN — MORPHINE SULFATE 2 MG: 4 INJECTION, SOLUTION INTRAMUSCULAR; INTRAVENOUS at 05:16

## 2022-02-24 RX ADMIN — OXYCODONE HYDROCHLORIDE 10 MG: 5 TABLET ORAL at 11:19

## 2022-02-24 RX ADMIN — KETOROLAC TROMETHAMINE 30 MG: 30 INJECTION, SOLUTION INTRAMUSCULAR at 15:03

## 2022-02-24 RX ADMIN — MORPHINE SULFATE 2 MG: 4 INJECTION, SOLUTION INTRAMUSCULAR; INTRAVENOUS at 11:19

## 2022-02-24 RX ADMIN — KETOROLAC TROMETHAMINE 30 MG: 30 INJECTION, SOLUTION INTRAMUSCULAR at 08:24

## 2022-02-24 RX ADMIN — OXCARBAZEPINE 1200 MG: 300 TABLET, FILM COATED ORAL at 08:23

## 2022-02-24 RX ADMIN — ASPIRIN 81 MG 81 MG: 81 TABLET ORAL at 08:24

## 2022-02-24 RX ADMIN — MORPHINE SULFATE 2 MG: 4 INJECTION, SOLUTION INTRAMUSCULAR; INTRAVENOUS at 03:10

## 2022-02-24 RX ADMIN — ACETAMINOPHEN 1000 MG: 500 TABLET, FILM COATED ORAL at 22:23

## 2022-02-24 RX ADMIN — SENNOSIDES AND DOCUSATE SODIUM 2 TABLET: 50; 8.6 TABLET ORAL at 20:27

## 2022-02-24 RX ADMIN — SODIUM CHLORIDE, POTASSIUM CHLORIDE, SODIUM LACTATE AND CALCIUM CHLORIDE 100 ML/HR: 600; 310; 30; 20 INJECTION, SOLUTION INTRAVENOUS at 11:22

## 2022-02-24 RX ADMIN — HYDRALAZINE HYDROCHLORIDE 10 MG: 10 TABLET ORAL at 01:02

## 2022-02-24 RX ADMIN — CYCLOBENZAPRINE 10 MG: 10 TABLET, FILM COATED ORAL at 18:35

## 2022-02-24 RX ADMIN — ACETAMINOPHEN 1000 MG: 500 TABLET, FILM COATED ORAL at 15:03

## 2022-02-24 RX ADMIN — OXCARBAZEPINE 1200 MG: 300 TABLET, FILM COATED ORAL at 20:27

## 2022-02-24 RX ADMIN — MORPHINE SULFATE 2 MG: 4 INJECTION, SOLUTION INTRAMUSCULAR; INTRAVENOUS at 06:24

## 2022-02-24 RX ADMIN — OXYCODONE HYDROCHLORIDE 10 MG: 5 TABLET ORAL at 15:04

## 2022-02-24 RX ADMIN — OXYCODONE HYDROCHLORIDE 10 MG: 5 TABLET ORAL at 20:27

## 2022-02-24 RX ADMIN — FAMOTIDINE 20 MG: 20 TABLET, FILM COATED ORAL at 08:24

## 2022-02-24 RX ADMIN — MORPHINE SULFATE 2 MG: 4 INJECTION, SOLUTION INTRAMUSCULAR; INTRAVENOUS at 18:35

## 2022-02-24 RX ADMIN — ACETAMINOPHEN 1000 MG: 500 TABLET, FILM COATED ORAL at 05:16

## 2022-02-24 RX ADMIN — CYCLOBENZAPRINE 10 MG: 10 TABLET, FILM COATED ORAL at 08:24

## 2022-02-24 RX ADMIN — SODIUM CHLORIDE 1.5 G: 900 INJECTION INTRAVENOUS at 01:02

## 2022-02-24 RX ADMIN — FAMOTIDINE 20 MG: 20 TABLET, FILM COATED ORAL at 18:35

## 2022-02-25 ENCOUNTER — APPOINTMENT (OUTPATIENT)
Dept: GENERAL RADIOLOGY | Facility: HOSPITAL | Age: 42
End: 2022-02-25

## 2022-02-25 PROBLEM — E87.1 HYPONATREMIA: Status: ACTIVE | Noted: 2022-02-25

## 2022-02-25 PROBLEM — R79.89 ELEVATED SERUM CREATININE: Status: ACTIVE | Noted: 2022-02-25

## 2022-02-25 LAB
ANION GAP SERPL CALCULATED.3IONS-SCNC: 9 MMOL/L (ref 5–15)
BH BB BLOOD EXPIRATION DATE: NORMAL
BH BB BLOOD EXPIRATION DATE: NORMAL
BH BB BLOOD TYPE BARCODE: 5100
BH BB BLOOD TYPE BARCODE: 5100
BH BB DISPENSE STATUS: NORMAL
BH BB DISPENSE STATUS: NORMAL
BH BB PRODUCT CODE: NORMAL
BH BB PRODUCT CODE: NORMAL
BH BB UNIT NUMBER: NORMAL
BH BB UNIT NUMBER: NORMAL
BUN SERPL-MCNC: 23 MG/DL (ref 6–20)
BUN/CREAT SERPL: 17.7 (ref 7–25)
CALCIUM SPEC-SCNC: 9 MG/DL (ref 8.6–10.5)
CHLORIDE SERPL-SCNC: 93 MMOL/L (ref 98–107)
CO2 SERPL-SCNC: 25 MMOL/L (ref 22–29)
CREAT SERPL-MCNC: 1.3 MG/DL (ref 0.76–1.27)
CREAT UR-MCNC: 374 MG/DL
CROSSMATCH INTERPRETATION: NORMAL
CROSSMATCH INTERPRETATION: NORMAL
DEPRECATED RDW RBC AUTO: 45.7 FL (ref 37–54)
ERYTHROCYTE [DISTWIDTH] IN BLOOD BY AUTOMATED COUNT: 13.2 % (ref 12.3–15.4)
GFR SERPL CREATININE-BSD FRML MDRD: 74 ML/MIN/1.73
GLUCOSE SERPL-MCNC: 142 MG/DL (ref 65–99)
HCT VFR BLD AUTO: 34.2 % (ref 37.5–51)
HGB BLD-MCNC: 11.5 G/DL (ref 13–17.7)
MAGNESIUM SERPL-MCNC: 2.6 MG/DL (ref 1.6–2.6)
MCH RBC QN AUTO: 31.6 PG (ref 26.6–33)
MCHC RBC AUTO-ENTMCNC: 33.6 G/DL (ref 31.5–35.7)
MCV RBC AUTO: 94 FL (ref 79–97)
OSMOLALITY SERPL: 273 MOSM/KG (ref 275–295)
OSMOLALITY UR: 904 MOSM/KG (ref 300–1100)
PLATELET # BLD AUTO: 229 10*3/MM3 (ref 140–450)
PMV BLD AUTO: 9.7 FL (ref 6–12)
POTASSIUM SERPL-SCNC: 4.7 MMOL/L (ref 3.5–5.2)
RBC # BLD AUTO: 3.64 10*6/MM3 (ref 4.14–5.8)
SODIUM SERPL-SCNC: 127 MMOL/L (ref 136–145)
SODIUM UR-SCNC: <20 MMOL/L
UNIT  ABO: NORMAL
UNIT  ABO: NORMAL
UNIT  RH: NORMAL
UNIT  RH: NORMAL
UUN 24H UR-MCNC: 1484 MG/DL
WBC NRBC COR # BLD: 7.54 10*3/MM3 (ref 3.4–10.8)

## 2022-02-25 PROCEDURE — 84540 ASSAY OF URINE/UREA-N: CPT | Performed by: INTERNAL MEDICINE

## 2022-02-25 PROCEDURE — 84300 ASSAY OF URINE SODIUM: CPT | Performed by: INTERNAL MEDICINE

## 2022-02-25 PROCEDURE — 83930 ASSAY OF BLOOD OSMOLALITY: CPT | Performed by: INTERNAL MEDICINE

## 2022-02-25 PROCEDURE — 97116 GAIT TRAINING THERAPY: CPT

## 2022-02-25 PROCEDURE — 83935 ASSAY OF URINE OSMOLALITY: CPT | Performed by: INTERNAL MEDICINE

## 2022-02-25 PROCEDURE — 97161 PT EVAL LOW COMPLEX 20 MIN: CPT

## 2022-02-25 PROCEDURE — 80048 BASIC METABOLIC PNL TOTAL CA: CPT | Performed by: THORACIC SURGERY (CARDIOTHORACIC VASCULAR SURGERY)

## 2022-02-25 PROCEDURE — 71045 X-RAY EXAM CHEST 1 VIEW: CPT

## 2022-02-25 PROCEDURE — 83735 ASSAY OF MAGNESIUM: CPT | Performed by: THORACIC SURGERY (CARDIOTHORACIC VASCULAR SURGERY)

## 2022-02-25 PROCEDURE — 25010000002 KETOROLAC TROMETHAMINE PER 15 MG: Performed by: INTERNAL MEDICINE

## 2022-02-25 PROCEDURE — 99233 SBSQ HOSP IP/OBS HIGH 50: CPT | Performed by: INTERNAL MEDICINE

## 2022-02-25 PROCEDURE — 0 MORPHINE SULFATE 4 MG/ML SOLUTION: Performed by: INTERNAL MEDICINE

## 2022-02-25 PROCEDURE — 85027 COMPLETE CBC AUTOMATED: CPT | Performed by: THORACIC SURGERY (CARDIOTHORACIC VASCULAR SURGERY)

## 2022-02-25 PROCEDURE — 82570 ASSAY OF URINE CREATININE: CPT | Performed by: INTERNAL MEDICINE

## 2022-02-25 PROCEDURE — 99024 POSTOP FOLLOW-UP VISIT: CPT

## 2022-02-25 RX ORDER — SODIUM CHLORIDE 0.9 % (FLUSH) 0.9 %
10 SYRINGE (ML) INJECTION EVERY 12 HOURS SCHEDULED
Status: DISCONTINUED | OUTPATIENT
Start: 2022-02-25 | End: 2022-02-28 | Stop reason: HOSPADM

## 2022-02-25 RX ORDER — DOCUSATE SODIUM 100 MG/1
100 CAPSULE, LIQUID FILLED ORAL 2 TIMES DAILY PRN
Status: DISCONTINUED | OUTPATIENT
Start: 2022-02-25 | End: 2022-02-28 | Stop reason: HOSPADM

## 2022-02-25 RX ORDER — SODIUM CHLORIDE 0.9 % (FLUSH) 0.9 %
10 SYRINGE (ML) INJECTION EVERY 8 HOURS SCHEDULED
Status: DISCONTINUED | OUTPATIENT
Start: 2022-02-25 | End: 2022-02-28 | Stop reason: HOSPADM

## 2022-02-25 RX ORDER — BISACODYL 5 MG/1
10 TABLET, DELAYED RELEASE ORAL DAILY PRN
Status: DISCONTINUED | OUTPATIENT
Start: 2022-02-25 | End: 2022-02-25 | Stop reason: SDUPTHER

## 2022-02-25 RX ORDER — AMOXICILLIN 250 MG
2 CAPSULE ORAL 2 TIMES DAILY PRN
Status: DISCONTINUED | OUTPATIENT
Start: 2022-02-25 | End: 2022-02-25 | Stop reason: SDUPTHER

## 2022-02-25 RX ORDER — BISACODYL 10 MG
10 SUPPOSITORY, RECTAL RECTAL DAILY PRN
Status: DISCONTINUED | OUTPATIENT
Start: 2022-02-25 | End: 2022-02-25 | Stop reason: SDUPTHER

## 2022-02-25 RX ADMIN — OXCARBAZEPINE 1200 MG: 300 TABLET, FILM COATED ORAL at 09:22

## 2022-02-25 RX ADMIN — SENNOSIDES AND DOCUSATE SODIUM 2 TABLET: 50; 8.6 TABLET ORAL at 22:02

## 2022-02-25 RX ADMIN — Medication 10 ML: at 14:00

## 2022-02-25 RX ADMIN — OXYCODONE HYDROCHLORIDE 10 MG: 5 TABLET ORAL at 22:02

## 2022-02-25 RX ADMIN — CYCLOBENZAPRINE 10 MG: 10 TABLET, FILM COATED ORAL at 04:57

## 2022-02-25 RX ADMIN — SENNOSIDES AND DOCUSATE SODIUM 2 TABLET: 50; 8.6 TABLET ORAL at 09:22

## 2022-02-25 RX ADMIN — Medication 10 ML: at 09:21

## 2022-02-25 RX ADMIN — ASPIRIN 81 MG 81 MG: 81 TABLET ORAL at 09:22

## 2022-02-25 RX ADMIN — ACETAMINOPHEN 1000 MG: 500 TABLET, FILM COATED ORAL at 14:00

## 2022-02-25 RX ADMIN — KETOROLAC TROMETHAMINE 30 MG: 30 INJECTION, SOLUTION INTRAMUSCULAR at 00:24

## 2022-02-25 RX ADMIN — FAMOTIDINE 20 MG: 20 TABLET, FILM COATED ORAL at 09:21

## 2022-02-25 RX ADMIN — ACETAMINOPHEN 1000 MG: 500 TABLET, FILM COATED ORAL at 22:02

## 2022-02-25 RX ADMIN — FAMOTIDINE 20 MG: 20 TABLET, FILM COATED ORAL at 16:30

## 2022-02-25 RX ADMIN — OXCARBAZEPINE 1200 MG: 300 TABLET, FILM COATED ORAL at 22:03

## 2022-02-25 RX ADMIN — OXYCODONE HYDROCHLORIDE 10 MG: 5 TABLET ORAL at 16:41

## 2022-02-25 RX ADMIN — MORPHINE SULFATE 2 MG: 4 INJECTION, SOLUTION INTRAMUSCULAR; INTRAVENOUS at 04:57

## 2022-02-26 ENCOUNTER — APPOINTMENT (OUTPATIENT)
Dept: GENERAL RADIOLOGY | Facility: HOSPITAL | Age: 42
End: 2022-02-26

## 2022-02-26 LAB
ANION GAP SERPL CALCULATED.3IONS-SCNC: 8 MMOL/L (ref 5–15)
BASOPHILS # BLD AUTO: 0.04 10*3/MM3 (ref 0–0.2)
BASOPHILS NFR BLD AUTO: 0.7 % (ref 0–1.5)
BUN SERPL-MCNC: 14 MG/DL (ref 6–20)
BUN/CREAT SERPL: 16.7 (ref 7–25)
CALCIUM SPEC-SCNC: 9.1 MG/DL (ref 8.6–10.5)
CHLORIDE SERPL-SCNC: 93 MMOL/L (ref 98–107)
CO2 SERPL-SCNC: 25 MMOL/L (ref 22–29)
CREAT SERPL-MCNC: 0.84 MG/DL (ref 0.76–1.27)
DEPRECATED RDW RBC AUTO: 43.3 FL (ref 37–54)
EOSINOPHIL # BLD AUTO: 0.69 10*3/MM3 (ref 0–0.4)
EOSINOPHIL NFR BLD AUTO: 12.5 % (ref 0.3–6.2)
ERYTHROCYTE [DISTWIDTH] IN BLOOD BY AUTOMATED COUNT: 13 % (ref 12.3–15.4)
GFR SERPL CREATININE-BSD FRML MDRD: 123 ML/MIN/1.73
GLUCOSE SERPL-MCNC: 115 MG/DL (ref 65–99)
HCT VFR BLD AUTO: 32.4 % (ref 37.5–51)
HGB BLD-MCNC: 11.1 G/DL (ref 13–17.7)
IMM GRANULOCYTES # BLD AUTO: 0.01 10*3/MM3 (ref 0–0.05)
IMM GRANULOCYTES NFR BLD AUTO: 0.2 % (ref 0–0.5)
LYMPHOCYTES # BLD AUTO: 1.19 10*3/MM3 (ref 0.7–3.1)
LYMPHOCYTES NFR BLD AUTO: 21.6 % (ref 19.6–45.3)
MCH RBC QN AUTO: 31.1 PG (ref 26.6–33)
MCHC RBC AUTO-ENTMCNC: 34.3 G/DL (ref 31.5–35.7)
MCV RBC AUTO: 90.8 FL (ref 79–97)
MONOCYTES # BLD AUTO: 1.08 10*3/MM3 (ref 0.1–0.9)
MONOCYTES NFR BLD AUTO: 19.6 % (ref 5–12)
NEUTROPHILS NFR BLD AUTO: 2.49 10*3/MM3 (ref 1.7–7)
NEUTROPHILS NFR BLD AUTO: 45.4 % (ref 42.7–76)
NRBC BLD AUTO-RTO: 0 /100 WBC (ref 0–0.2)
PLATELET # BLD AUTO: 240 10*3/MM3 (ref 140–450)
PMV BLD AUTO: 9.7 FL (ref 6–12)
POTASSIUM SERPL-SCNC: 4.6 MMOL/L (ref 3.5–5.2)
RBC # BLD AUTO: 3.57 10*6/MM3 (ref 4.14–5.8)
SODIUM SERPL-SCNC: 126 MMOL/L (ref 136–145)
WBC NRBC COR # BLD: 5.5 10*3/MM3 (ref 3.4–10.8)

## 2022-02-26 PROCEDURE — 71045 X-RAY EXAM CHEST 1 VIEW: CPT

## 2022-02-26 PROCEDURE — 99233 SBSQ HOSP IP/OBS HIGH 50: CPT | Performed by: INTERNAL MEDICINE

## 2022-02-26 PROCEDURE — 80048 BASIC METABOLIC PNL TOTAL CA: CPT | Performed by: STUDENT IN AN ORGANIZED HEALTH CARE EDUCATION/TRAINING PROGRAM

## 2022-02-26 PROCEDURE — 85025 COMPLETE CBC W/AUTO DIFF WBC: CPT | Performed by: INTERNAL MEDICINE

## 2022-02-26 RX ORDER — LACTULOSE 10 G/15ML
30 SOLUTION ORAL ONCE
Status: COMPLETED | OUTPATIENT
Start: 2022-02-26 | End: 2022-02-26

## 2022-02-26 RX ADMIN — OXYCODONE HYDROCHLORIDE 10 MG: 5 TABLET ORAL at 12:49

## 2022-02-26 RX ADMIN — ACETAMINOPHEN 1000 MG: 500 TABLET, FILM COATED ORAL at 14:41

## 2022-02-26 RX ADMIN — OXCARBAZEPINE 1200 MG: 300 TABLET, FILM COATED ORAL at 08:06

## 2022-02-26 RX ADMIN — FAMOTIDINE 20 MG: 20 TABLET, FILM COATED ORAL at 07:56

## 2022-02-26 RX ADMIN — Medication 10 ML: at 21:00

## 2022-02-26 RX ADMIN — ACETAMINOPHEN 1000 MG: 500 TABLET, FILM COATED ORAL at 05:15

## 2022-02-26 RX ADMIN — OXYCODONE HYDROCHLORIDE 10 MG: 5 TABLET ORAL at 06:56

## 2022-02-26 RX ADMIN — Medication 10 ML: at 14:41

## 2022-02-26 RX ADMIN — FAMOTIDINE 20 MG: 20 TABLET, FILM COATED ORAL at 16:36

## 2022-02-26 RX ADMIN — SENNOSIDES AND DOCUSATE SODIUM 2 TABLET: 50; 8.6 TABLET ORAL at 20:59

## 2022-02-26 RX ADMIN — OXCARBAZEPINE 1200 MG: 300 TABLET, FILM COATED ORAL at 21:00

## 2022-02-26 RX ADMIN — Medication 10 ML: at 08:07

## 2022-02-26 RX ADMIN — ACETAMINOPHEN 1000 MG: 500 TABLET, FILM COATED ORAL at 20:59

## 2022-02-26 RX ADMIN — LACTULOSE 30 G: 20 SOLUTION ORAL at 12:45

## 2022-02-26 RX ADMIN — ASPIRIN 81 MG 81 MG: 81 TABLET ORAL at 08:07

## 2022-02-26 RX ADMIN — SENNOSIDES AND DOCUSATE SODIUM 2 TABLET: 50; 8.6 TABLET ORAL at 08:07

## 2022-02-27 ENCOUNTER — APPOINTMENT (OUTPATIENT)
Dept: GENERAL RADIOLOGY | Facility: HOSPITAL | Age: 42
End: 2022-02-27

## 2022-02-27 LAB
ANION GAP SERPL CALCULATED.3IONS-SCNC: 9 MMOL/L (ref 5–15)
BASOPHILS # BLD AUTO: 0.05 10*3/MM3 (ref 0–0.2)
BASOPHILS NFR BLD AUTO: 1.1 % (ref 0–1.5)
BUN SERPL-MCNC: 15 MG/DL (ref 6–20)
BUN/CREAT SERPL: 17.4 (ref 7–25)
CALCIUM SPEC-SCNC: 9.4 MG/DL (ref 8.6–10.5)
CHLORIDE SERPL-SCNC: 95 MMOL/L (ref 98–107)
CO2 SERPL-SCNC: 26 MMOL/L (ref 22–29)
CREAT SERPL-MCNC: 0.86 MG/DL (ref 0.76–1.27)
DEPRECATED RDW RBC AUTO: 44.2 FL (ref 37–54)
EOSINOPHIL # BLD AUTO: 0.63 10*3/MM3 (ref 0–0.4)
EOSINOPHIL NFR BLD AUTO: 13.3 % (ref 0.3–6.2)
ERYTHROCYTE [DISTWIDTH] IN BLOOD BY AUTOMATED COUNT: 13.1 % (ref 12.3–15.4)
GFR SERPL CREATININE-BSD FRML MDRD: 120 ML/MIN/1.73
GLUCOSE SERPL-MCNC: 113 MG/DL (ref 65–99)
HCT VFR BLD AUTO: 32.8 % (ref 37.5–51)
HGB BLD-MCNC: 11 G/DL (ref 13–17.7)
IMM GRANULOCYTES # BLD AUTO: 0.02 10*3/MM3 (ref 0–0.05)
IMM GRANULOCYTES NFR BLD AUTO: 0.4 % (ref 0–0.5)
LYMPHOCYTES # BLD AUTO: 0.89 10*3/MM3 (ref 0.7–3.1)
LYMPHOCYTES NFR BLD AUTO: 18.8 % (ref 19.6–45.3)
MCH RBC QN AUTO: 31 PG (ref 26.6–33)
MCHC RBC AUTO-ENTMCNC: 33.5 G/DL (ref 31.5–35.7)
MCV RBC AUTO: 92.4 FL (ref 79–97)
MONOCYTES # BLD AUTO: 0.79 10*3/MM3 (ref 0.1–0.9)
MONOCYTES NFR BLD AUTO: 16.7 % (ref 5–12)
NEUTROPHILS NFR BLD AUTO: 2.35 10*3/MM3 (ref 1.7–7)
NEUTROPHILS NFR BLD AUTO: 49.7 % (ref 42.7–76)
NRBC BLD AUTO-RTO: 0 /100 WBC (ref 0–0.2)
PLATELET # BLD AUTO: 248 10*3/MM3 (ref 140–450)
PMV BLD AUTO: 9.4 FL (ref 6–12)
POTASSIUM SERPL-SCNC: 4.5 MMOL/L (ref 3.5–5.2)
RBC # BLD AUTO: 3.55 10*6/MM3 (ref 4.14–5.8)
SODIUM SERPL-SCNC: 130 MMOL/L (ref 136–145)
WBC NRBC COR # BLD: 4.73 10*3/MM3 (ref 3.4–10.8)

## 2022-02-27 PROCEDURE — 85025 COMPLETE CBC W/AUTO DIFF WBC: CPT | Performed by: INTERNAL MEDICINE

## 2022-02-27 PROCEDURE — 71045 X-RAY EXAM CHEST 1 VIEW: CPT

## 2022-02-27 PROCEDURE — 80048 BASIC METABOLIC PNL TOTAL CA: CPT | Performed by: STUDENT IN AN ORGANIZED HEALTH CARE EDUCATION/TRAINING PROGRAM

## 2022-02-27 PROCEDURE — 99232 SBSQ HOSP IP/OBS MODERATE 35: CPT | Performed by: INTERNAL MEDICINE

## 2022-02-27 RX ORDER — LACTULOSE 10 G/15ML
30 SOLUTION ORAL 2 TIMES DAILY
Status: DISCONTINUED | OUTPATIENT
Start: 2022-02-27 | End: 2022-02-28

## 2022-02-27 RX ADMIN — ACETAMINOPHEN 1000 MG: 500 TABLET, FILM COATED ORAL at 14:45

## 2022-02-27 RX ADMIN — OXCARBAZEPINE 1200 MG: 300 TABLET, FILM COATED ORAL at 20:32

## 2022-02-27 RX ADMIN — ACETAMINOPHEN 1000 MG: 500 TABLET, FILM COATED ORAL at 20:32

## 2022-02-27 RX ADMIN — Medication 10 ML: at 14:46

## 2022-02-27 RX ADMIN — ACETAMINOPHEN 1000 MG: 500 TABLET, FILM COATED ORAL at 05:40

## 2022-02-27 RX ADMIN — LACTULOSE 30 G: 20 SOLUTION ORAL at 11:23

## 2022-02-27 RX ADMIN — ASPIRIN 81 MG 81 MG: 81 TABLET ORAL at 08:07

## 2022-02-27 RX ADMIN — OXYCODONE HYDROCHLORIDE 10 MG: 5 TABLET ORAL at 10:25

## 2022-02-27 RX ADMIN — OXYCODONE HYDROCHLORIDE 10 MG: 5 TABLET ORAL at 04:32

## 2022-02-27 RX ADMIN — SENNOSIDES AND DOCUSATE SODIUM 2 TABLET: 50; 8.6 TABLET ORAL at 08:07

## 2022-02-27 RX ADMIN — FAMOTIDINE 20 MG: 20 TABLET, FILM COATED ORAL at 17:24

## 2022-02-27 RX ADMIN — Medication 10 ML: at 20:33

## 2022-02-27 RX ADMIN — FAMOTIDINE 20 MG: 20 TABLET, FILM COATED ORAL at 08:06

## 2022-02-27 RX ADMIN — Medication 10 ML: at 05:41

## 2022-02-27 RX ADMIN — Medication 10 ML: at 08:07

## 2022-02-27 RX ADMIN — OXCARBAZEPINE 1200 MG: 300 TABLET, FILM COATED ORAL at 08:06

## 2022-02-28 ENCOUNTER — READMISSION MANAGEMENT (OUTPATIENT)
Dept: CALL CENTER | Facility: HOSPITAL | Age: 42
End: 2022-02-28

## 2022-02-28 ENCOUNTER — APPOINTMENT (OUTPATIENT)
Dept: GENERAL RADIOLOGY | Facility: HOSPITAL | Age: 42
End: 2022-02-28

## 2022-02-28 VITALS
DIASTOLIC BLOOD PRESSURE: 90 MMHG | RESPIRATION RATE: 18 BRPM | SYSTOLIC BLOOD PRESSURE: 138 MMHG | BODY MASS INDEX: 47.74 KG/M2 | OXYGEN SATURATION: 93 % | TEMPERATURE: 97.6 F | HEIGHT: 68 IN | WEIGHT: 315 LBS | HEART RATE: 100 BPM

## 2022-02-28 LAB
ANION GAP SERPL CALCULATED.3IONS-SCNC: 8 MMOL/L (ref 5–15)
BASOPHILS # BLD AUTO: 0.05 10*3/MM3 (ref 0–0.2)
BASOPHILS NFR BLD AUTO: 1.1 % (ref 0–1.5)
BUN SERPL-MCNC: 13 MG/DL (ref 6–20)
BUN/CREAT SERPL: 15.7 (ref 7–25)
CALCIUM SPEC-SCNC: 9.1 MG/DL (ref 8.6–10.5)
CHLORIDE SERPL-SCNC: 99 MMOL/L (ref 98–107)
CO2 SERPL-SCNC: 27 MMOL/L (ref 22–29)
CREAT SERPL-MCNC: 0.83 MG/DL (ref 0.76–1.27)
DEPRECATED RDW RBC AUTO: 43.6 FL (ref 37–54)
EOSINOPHIL # BLD AUTO: 0.8 10*3/MM3 (ref 0–0.4)
EOSINOPHIL NFR BLD AUTO: 17 % (ref 0.3–6.2)
ERYTHROCYTE [DISTWIDTH] IN BLOOD BY AUTOMATED COUNT: 13.1 % (ref 12.3–15.4)
GFR SERPL CREATININE-BSD FRML MDRD: 125 ML/MIN/1.73
GLUCOSE SERPL-MCNC: 118 MG/DL (ref 65–99)
HCT VFR BLD AUTO: 30.2 % (ref 37.5–51)
HGB BLD-MCNC: 10 G/DL (ref 13–17.7)
IMM GRANULOCYTES # BLD AUTO: 0.03 10*3/MM3 (ref 0–0.05)
IMM GRANULOCYTES NFR BLD AUTO: 0.6 % (ref 0–0.5)
LYMPHOCYTES # BLD AUTO: 0.92 10*3/MM3 (ref 0.7–3.1)
LYMPHOCYTES NFR BLD AUTO: 19.5 % (ref 19.6–45.3)
MCH RBC QN AUTO: 30.3 PG (ref 26.6–33)
MCHC RBC AUTO-ENTMCNC: 33.1 G/DL (ref 31.5–35.7)
MCV RBC AUTO: 91.5 FL (ref 79–97)
MONOCYTES # BLD AUTO: 0.69 10*3/MM3 (ref 0.1–0.9)
MONOCYTES NFR BLD AUTO: 14.6 % (ref 5–12)
NEUTROPHILS NFR BLD AUTO: 2.22 10*3/MM3 (ref 1.7–7)
NEUTROPHILS NFR BLD AUTO: 47.2 % (ref 42.7–76)
NRBC BLD AUTO-RTO: 0 /100 WBC (ref 0–0.2)
PLATELET # BLD AUTO: 251 10*3/MM3 (ref 140–450)
PMV BLD AUTO: 8.9 FL (ref 6–12)
POTASSIUM SERPL-SCNC: 4.3 MMOL/L (ref 3.5–5.2)
RBC # BLD AUTO: 3.3 10*6/MM3 (ref 4.14–5.8)
SODIUM SERPL-SCNC: 134 MMOL/L (ref 136–145)
WBC NRBC COR # BLD: 4.71 10*3/MM3 (ref 3.4–10.8)

## 2022-02-28 PROCEDURE — 80048 BASIC METABOLIC PNL TOTAL CA: CPT | Performed by: INTERNAL MEDICINE

## 2022-02-28 PROCEDURE — 71045 X-RAY EXAM CHEST 1 VIEW: CPT

## 2022-02-28 PROCEDURE — 99024 POSTOP FOLLOW-UP VISIT: CPT

## 2022-02-28 PROCEDURE — 99233 SBSQ HOSP IP/OBS HIGH 50: CPT | Performed by: INTERNAL MEDICINE

## 2022-02-28 PROCEDURE — 85025 COMPLETE CBC W/AUTO DIFF WBC: CPT | Performed by: INTERNAL MEDICINE

## 2022-02-28 RX ORDER — LACTULOSE 10 G/15ML
30 SOLUTION ORAL 3 TIMES DAILY
Status: DISCONTINUED | OUTPATIENT
Start: 2022-02-28 | End: 2022-02-28 | Stop reason: HOSPADM

## 2022-02-28 RX ORDER — NICOTINE 21 MG/24HR
1 PATCH, TRANSDERMAL 24 HOURS TRANSDERMAL
Qty: 30 EACH | Refills: 1 | Status: SHIPPED | OUTPATIENT
Start: 2022-02-28 | End: 2022-03-04

## 2022-02-28 RX ORDER — HYDROCODONE BITARTRATE AND ACETAMINOPHEN 7.5; 325 MG/1; MG/1
1 TABLET ORAL EVERY 6 HOURS PRN
Qty: 25 TABLET | Refills: 0 | Status: SHIPPED | OUTPATIENT
Start: 2022-02-28 | End: 2022-03-22

## 2022-02-28 RX ORDER — CYCLOBENZAPRINE HCL 10 MG
10 TABLET ORAL 3 TIMES DAILY PRN
Qty: 12 TABLET | Refills: 0 | Status: SHIPPED | OUTPATIENT
Start: 2022-02-28 | End: 2022-04-08

## 2022-02-28 RX ADMIN — ACETAMINOPHEN 1000 MG: 500 TABLET, FILM COATED ORAL at 05:24

## 2022-02-28 RX ADMIN — OXCARBAZEPINE 1200 MG: 300 TABLET, FILM COATED ORAL at 08:19

## 2022-02-28 RX ADMIN — ASPIRIN 81 MG 81 MG: 81 TABLET ORAL at 08:19

## 2022-02-28 RX ADMIN — Medication 10 ML: at 05:24

## 2022-02-28 RX ADMIN — OXYCODONE HYDROCHLORIDE 10 MG: 5 TABLET ORAL at 03:05

## 2022-02-28 RX ADMIN — FAMOTIDINE 20 MG: 20 TABLET, FILM COATED ORAL at 08:18

## 2022-02-28 RX ADMIN — Medication 10 ML: at 08:20

## 2022-03-01 ENCOUNTER — TRANSITIONAL CARE MANAGEMENT TELEPHONE ENCOUNTER (OUTPATIENT)
Dept: CALL CENTER | Facility: HOSPITAL | Age: 42
End: 2022-03-01

## 2022-03-01 ENCOUNTER — HOME HEALTH ADMISSION (OUTPATIENT)
Dept: HOME HEALTH SERVICES | Facility: HOME HEALTHCARE | Age: 42
End: 2022-03-01

## 2022-03-01 NOTE — PROGRESS NOTES
Informed this am that we are not in network with his Insurance plan by [facility]. Will decline pt.  notified. Home Health notified.

## 2022-03-01 NOTE — CASE MANAGEMENT/SOCIAL WORK
Continued Stay Note   Sandi     Patient Name: Jose Hardin  MRN: [id number]  Today's Date: 3/1/2022    Admit Date: 2/23/2022     Discharge Plan     Row Name 03/01/22 1118       Plan    Plan home with home health care    Patient/Family in Agreement with Plan yes    Plan Comments Recieved a call from Trudy with [facility] stating that they were not in network with Mr. Hardin's insurance and would not be able to follow him at home with physical therapy. I will reach out to another home health company.    Final Discharge Disposition Code 06 - home with home health care               Discharge Codes    No documentation.               Expected Discharge Date and Time     Expected Discharge Date Expected Discharge Time    Feb 28, 2022             Chintan Morataya RN

## 2022-03-02 ENCOUNTER — TELEPHONE (OUTPATIENT)
Dept: FAMILY MEDICINE CLINIC | Facility: CLINIC | Age: 42
End: 2022-03-02

## 2022-03-02 NOTE — CASE MANAGEMENT/SOCIAL WORK
Case Management Discharge Note      Final Note: Received a phone call from April, [facility], whom states that they will be able to follow Mr. Hardin with physical therapy.         Selected Continued Care - Discharged on 2/28/2022 Admission date: 2/23/2022 - Discharge disposition: Home or Self Care    Destination    No services have been selected for the patient.              Durable Medical Equipment    No services have been selected for the patient.              Dialysis/Infusion    No services have been selected for the patient.              Home Medical Care Coordination complete.    Service Provider Selected Services Address Phone Fax Patient Preferred    [facility] [address] [facility] [address] [phone] --          Therapy    No services have been selected for the patient.              Community Resources    No services have been selected for the patient.              Community & DME    No services have been selected for the patient.                       Final Discharge Disposition Code: 06 - home with home health care

## 2022-03-02 NOTE — TELEPHONE ENCOUNTER
Ester from [facility] called to request a verbal from Aminata to follow up with patient for his physical therapy treatment. Stated they can be called at [phone]

## 2022-03-03 ENCOUNTER — TELEPHONE (OUTPATIENT)
Dept: CARDIAC SURGERY | Facility: CLINIC | Age: 42
End: 2022-03-03

## 2022-03-03 LAB
CYTO UR: NORMAL
LAB AP CASE REPORT: NORMAL
LAB AP CLINICAL INFORMATION: NORMAL
LAB AP DIAGNOSIS COMMENT: NORMAL
LAB AP FLOW CYTOMETRY SUMMARY: NORMAL
Lab: NORMAL
PATH REPORT.FINAL DX SPEC: NORMAL
PATH REPORT.GROSS SPEC: NORMAL

## 2022-03-03 NOTE — TELEPHONE ENCOUNTER
Patient called to report mild, brown drainage from sternotomy incision. He reports he is doing well otherwise and has no other signs of infection.     Discussed with Dr. Fu's APRN, Ester. She asked to have patient come in today or tomorrow to have incision area evaluated.     Relayed information to patient. He stated that he would not be able to make it to appt today and that he has an appt with his PCP tomorrow. He asked if PCP could evaluate and determine if he needs to come in and see us. Advised that he could just see PCP and to call us with PCP recommendations. If he needs to come in, we will have him follow up with our office early next week. Advised to wash incision area gently with antibacterial soap and pat dry. Cover with 4x4 gauze and tape or large bandaid to keep drainage off of clothing. He verbalized understanding and agreed. He will call us to let us know PCP recommendations.

## 2022-03-04 ENCOUNTER — TELEPHONE (OUTPATIENT)
Dept: FAMILY MEDICINE CLINIC | Facility: CLINIC | Age: 42
End: 2022-03-04

## 2022-03-04 ENCOUNTER — LAB (OUTPATIENT)
Dept: LAB | Facility: HOSPITAL | Age: 42
End: 2022-03-04

## 2022-03-04 ENCOUNTER — OFFICE VISIT (OUTPATIENT)
Dept: FAMILY MEDICINE CLINIC | Facility: CLINIC | Age: 42
End: 2022-03-04

## 2022-03-04 VITALS
DIASTOLIC BLOOD PRESSURE: 90 MMHG | OXYGEN SATURATION: 97 % | WEIGHT: 315 LBS | SYSTOLIC BLOOD PRESSURE: 148 MMHG | HEART RATE: 118 BPM | HEIGHT: 68 IN | BODY MASS INDEX: 47.74 KG/M2

## 2022-03-04 DIAGNOSIS — E87.1 HYPONATREMIA: ICD-10-CM

## 2022-03-04 DIAGNOSIS — I10 PRIMARY HYPERTENSION: ICD-10-CM

## 2022-03-04 DIAGNOSIS — D49.89 THYMOMA: ICD-10-CM

## 2022-03-04 DIAGNOSIS — Z87.898 HISTORY OF SEIZURES: Chronic | ICD-10-CM

## 2022-03-04 DIAGNOSIS — M79.89 SWELLING OF LOWER EXTREMITY: Primary | ICD-10-CM

## 2022-03-04 LAB
ALBUMIN SERPL-MCNC: 4 G/DL (ref 3.5–5.2)
ALBUMIN/GLOB SERPL: 1.4 G/DL
ALP SERPL-CCNC: 50 U/L (ref 39–117)
ALT SERPL W P-5'-P-CCNC: 35 U/L (ref 1–41)
ANION GAP SERPL CALCULATED.3IONS-SCNC: 10 MMOL/L (ref 5–15)
AST SERPL-CCNC: 35 U/L (ref 1–40)
BASOPHILS # BLD AUTO: 0.03 10*3/MM3 (ref 0–0.2)
BASOPHILS NFR BLD AUTO: 0.5 % (ref 0–1.5)
BILIRUB SERPL-MCNC: 0.2 MG/DL (ref 0–1.2)
BUN SERPL-MCNC: 13 MG/DL (ref 6–20)
BUN/CREAT SERPL: 15.3 (ref 7–25)
CALCIUM SPEC-SCNC: 9.1 MG/DL (ref 8.6–10.5)
CHLORIDE SERPL-SCNC: 102 MMOL/L (ref 98–107)
CO2 SERPL-SCNC: 27 MMOL/L (ref 22–29)
CREAT SERPL-MCNC: 0.85 MG/DL (ref 0.76–1.27)
DEPRECATED RDW RBC AUTO: 43.5 FL (ref 37–54)
EGFRCR SERPLBLD CKD-EPI 2021: 113.4 ML/MIN/1.73
EOSINOPHIL # BLD AUTO: 0.71 10*3/MM3 (ref 0–0.4)
EOSINOPHIL NFR BLD AUTO: 12.5 % (ref 0.3–6.2)
ERYTHROCYTE [DISTWIDTH] IN BLOOD BY AUTOMATED COUNT: 12.6 % (ref 12.3–15.4)
GLOBULIN UR ELPH-MCNC: 2.9 GM/DL
GLUCOSE SERPL-MCNC: 100 MG/DL (ref 65–99)
HCT VFR BLD AUTO: 31.5 % (ref 37.5–51)
HGB BLD-MCNC: 10.2 G/DL (ref 13–17.7)
IMM GRANULOCYTES # BLD AUTO: 0.03 10*3/MM3 (ref 0–0.05)
IMM GRANULOCYTES NFR BLD AUTO: 0.5 % (ref 0–0.5)
LYMPHOCYTES # BLD AUTO: 1.42 10*3/MM3 (ref 0.7–3.1)
LYMPHOCYTES NFR BLD AUTO: 24.9 % (ref 19.6–45.3)
MCH RBC QN AUTO: 30.4 PG (ref 26.6–33)
MCHC RBC AUTO-ENTMCNC: 32.4 G/DL (ref 31.5–35.7)
MCV RBC AUTO: 93.8 FL (ref 79–97)
MONOCYTES # BLD AUTO: 0.65 10*3/MM3 (ref 0.1–0.9)
MONOCYTES NFR BLD AUTO: 11.4 % (ref 5–12)
NEUTROPHILS NFR BLD AUTO: 2.86 10*3/MM3 (ref 1.7–7)
NEUTROPHILS NFR BLD AUTO: 50.2 % (ref 42.7–76)
NRBC BLD AUTO-RTO: 0 /100 WBC (ref 0–0.2)
PLATELET # BLD AUTO: 349 10*3/MM3 (ref 140–450)
PMV BLD AUTO: 9.1 FL (ref 6–12)
POTASSIUM SERPL-SCNC: 4.1 MMOL/L (ref 3.5–5.2)
PROT SERPL-MCNC: 6.9 G/DL (ref 6–8.5)
RBC # BLD AUTO: 3.36 10*6/MM3 (ref 4.14–5.8)
SODIUM SERPL-SCNC: 139 MMOL/L (ref 136–145)
WBC NRBC COR # BLD: 5.7 10*3/MM3 (ref 3.4–10.8)

## 2022-03-04 PROCEDURE — 80053 COMPREHEN METABOLIC PANEL: CPT

## 2022-03-04 PROCEDURE — 85025 COMPLETE CBC W/AUTO DIFF WBC: CPT

## 2022-03-04 PROCEDURE — 99495 TRANSJ CARE MGMT MOD F2F 14D: CPT | Performed by: INTERNAL MEDICINE

## 2022-03-04 RX ORDER — FUROSEMIDE 40 MG/1
40 TABLET ORAL DAILY
Qty: 5 TABLET | Refills: 0 | Status: SHIPPED | OUTPATIENT
Start: 2022-03-04 | End: 2022-03-11

## 2022-03-04 RX ORDER — CEPHALEXIN 500 MG/1
500 CAPSULE ORAL 2 TIMES DAILY
Qty: 14 CAPSULE | Refills: 0 | Status: SHIPPED | OUTPATIENT
Start: 2022-03-04 | End: 2022-03-11

## 2022-03-04 RX ORDER — POTASSIUM CHLORIDE 20 MEQ/1
40 TABLET, EXTENDED RELEASE ORAL DAILY
Qty: 10 TABLET | Refills: 0 | Status: SHIPPED | OUTPATIENT
Start: 2022-03-04 | End: 2022-03-09

## 2022-03-04 NOTE — PROGRESS NOTES
Chief Complaint   Patient presents with   • Hospital Follow Up Visit     Thymoma ; [facility] ED f/u admission 2/23/2022 - discharge 2/28/2022   • Scar     discharge around the scar    • Edema     Leg swelling bilateral ; mostly left leg    • Hypertension   Current outpatient and discharge medications have been reconciled for the patient.  Reviewed by: Giovanny Coates DO      HPI:  Jose Hardin is a 41 y.o. male who presents today for hospital follow-up thymectomy.  Has upcoming appointments with CT surgery and oncology.  The lower part of incision has been draining brown fluid.  Somewhat elevated blood pressures since hospital discharge.  Increased lower extremity edema as well.    ROS:  Constitutional: no fevers, night sweats or unexplained weight loss  Eyes: no vision changes  ENT: no runny nose, ear pain, sore throat  Cardio: no chest pain, palpitations  Pulm: no shortness of breath, wheezing, or cough  GI: no abdominal pain or changes in bowel movements  : no difficulty urinating  MSK: no difficulty ambulating, no joint pain  Neuro: no weakness, dizziness or headache  Psych: no trouble sleeping  Endo: no change in appetite      Past Medical History:   Diagnosis Date   • Acromioclavicular separation Roughly 20 years ago    High school football injury   • ADHD (attention deficit hyperactivity disorder)    • Anxiety    • Arthritis    • Benign tumor of thymus    • Depression    • History of medical problems     Psuedotumor cerebri/Intracranial hypertension   • Knee sprain Over 20 years ago    Old injury   • Knee swelling Early Dec.    Recent X-Ray for this visit   • Obesity    • Seizures (HCC)     having seizures once every two weeks x 2 years   • Stroke (HCC)     Mini-stroke/TIA   • Visual impairment       Family History   Problem Relation Age of Onset   • Hypertension Mother    • Mental illness Mother    • Kidney disease Mother    • Depression Mother    • Hypertension Father    • Kidney disease  Father    • Cancer Father    • Heart disease Paternal Grandfather       Social History     Socioeconomic History   • Marital status: Single   • Number of children: 0   Tobacco Use   • Smoking status: Never Smoker   • Smokeless tobacco: Never Used   Vaping Use   • Vaping Use: Some days   • Substances: CBD   • Devices: Pre-filled or refillable cartridge   Substance and Sexual Activity   • Alcohol use: Yes     Alcohol/week: 4.0 standard drinks     Types: 2 Cans of beer, 2 Standard drinks or equivalent per week   • Drug use: Never   • Sexual activity: Yes     Partners: Female     Birth control/protection: None      No Known Allergies   Immunization History   Administered Date(s) Administered   • COVID-19 (PFIZER) PURPLE CAP 03/25/2021, 04/16/2021   • FluLaval/Fluarix/Fluzone >6 01/05/2021, 01/31/2022   • Tdap 09/02/2020        PE:  Vitals:    03/04/22 1259   BP: 148/90   Pulse: 118   SpO2: 97%      Body mass index is 54.59 kg/m².    Gen Appearance: NAD  HEENT: Normocephalic, PERRLA, no thyromegaly, trache midline  Heart: RRR, normal S1 and S2, no murmur  Lungs: CTA b/l, no wheezing, no crackles  Abdomen: Soft, non-tender, non-distended, no guarding and BSx4  MSK: Moves all extremities well, normal gait, no peripheral edema  Pulses: Palpable and equal b/l  Lymph nodes: No palpable lymphadenopathy   Neuro: No focal deficits      Current Outpatient Medications   Medication Sig Dispense Refill   • aspirin 81 MG chewable tablet Chew 81 mg Daily.     • cyclobenzaprine (FLEXERIL) 10 MG tablet Take 1 tablet by mouth 3 (Three) Times a Day As Needed for Muscle Spasms. 12 tablet 0   • Diclofenac Sodium (VOLTAREN) 1 % gel gel Apply 4 g topically to the appropriate area as directed 4 (Four) Times a Day As Needed (knee pain). 100 g 1   • HYDROcodone-acetaminophen (Norco) 7.5-325 MG per tablet Take 1 tablet by mouth Every 6 (Six) Hours As Needed for Moderate Pain . 25 tablet 0   • naproxen (NAPROSYN) 500 MG tablet Take 1 tablet by  mouth 2 (Two) Times a Day. 60 tablet 2   • OXcarbazepine (Trileptal) 300 MG tablet Take 1,200 mg by mouth 2 (Two) Times a Day.     • pantoprazole (PROTONIX) 40 MG EC tablet Take 1 tablet by mouth Every Morning. 30 tablet 0   • cephalexin (Keflex) 500 MG capsule Take 1 capsule by mouth 2 (Two) Times a Day for 7 days. 14 capsule 0   • furosemide (Lasix) 40 MG tablet Take 1 tablet by mouth Daily for 5 days. 5 tablet 0   • nicotine (NICODERM CQ) 14 MG/24HR patch Place 1 patch on the skin as directed by provider Daily. 30 each 1   • potassium chloride (K-DUR,KLOR-CON) 20 MEQ CR tablet Take 2 tablets by mouth Daily for 5 days. 10 tablet 0     No current facility-administered medications for this visit.      Trial short course of Lasix.  Continue to monitor blood pressure over the next week.  If persistently elevated 1 week from now would recommend starting on blood pressure medicine.  Will likely start diuretic such as HCTZ.  Low suspicion for infection, but he does have a significant amount of brown drainage at the bottom of his incision with mild erythema.  Top back appears normal.  Will start on Keflex and see back in 1 week.  For any worsening symptoms would recommend seeing CT surgery.  Wash with antibacterial soap and keep covered with gauze and bandage.  Rechecking labs, hyponatremia during hospitalization which has been improving with fluid restriction.    Diagnoses and all orders for this visit:    1. Swelling of lower extremity (Primary)  -     furosemide (Lasix) 40 MG tablet; Take 1 tablet by mouth Daily for 5 days.  Dispense: 5 tablet; Refill: 0  -     potassium chloride (K-DUR,KLOR-CON) 20 MEQ CR tablet; Take 2 tablets by mouth Daily for 5 days.  Dispense: 10 tablet; Refill: 0    2. Hyponatremia  -     Comprehensive Metabolic Panel; Future  -     CBC & Differential; Future    3. Thymoma    4. History of seizures    5. Primary hypertension    Other orders  -     cephalexin (Keflex) 500 MG capsule; Take 1  capsule by mouth 2 (Two) Times a Day for 7 days.  Dispense: 14 capsule; Refill: 0         Return in about 1 week (around 3/11/2022) for swelling, incision drainage.     Dictated Utilizing Dragon Dictation    Please note that portions of this note were completed with a voice recognition program.    Part of this note may be an electronic transcription/translation of spoken language to printed text using the Dragon Dictation System.

## 2022-03-04 NOTE — TELEPHONE ENCOUNTER
Caller: ANISHA WILKERSON    Relationship: PHYSICAL THERAPIST WITH [facility]    Best call back number: [phone]    What test was performed: PHYSICAL THERAPY      Additional notes: PHYSICAL THERAPY WILL SEE PATIENT FOR 3 ADDITIONAL VISITS, WILL WORK ON BUILDING UP STRENGTH AND ENDURANCE FOLLOWING SURGERY.    THERE ARE A FEW NURSING THINGS TO CHECK IN TO TODAY-   SINCE PATIENT'S SURGERY ON 2/23/22 THE PATIENT HAS INCREASED LEG SWELLING AND BILATERAL LEGS WERE VERY TIGHT 3/3/22,   THE BLOOD PRESSURE IS ELEVATED 144/86 ON 3/3/22-   PATIENT REPORTS BLOOD PRESSURE HAS BEEN AS HIGH .   LOWER PART OF CHEST INCISION HAS YELLOWISH DRAINAGE, PLEASE REVIEW FOR INFECTION.    IF ANY QUESTIONS, PLEASE CALL

## 2022-03-06 NOTE — OUTREACH NOTE
Call Center TCM Note      Responses   [facility] patient discharged from? [place]   Does the patient have one of the following disease processes/diagnoses(primary or secondary)? Other   TCM attempt successful? Yes  [Verbal release. Father]   Call start time 1252   Call end time 1300   Discharge diagnosis thymoma  Thymic carcinoma    Meds reviewed with patient/caregiver? Yes   Is the patient having any side effects they believe may be caused by any medication additions or changes? No   Does the patient have all medications ordered at discharge? Yes   Is the patient taking all medications as directed (includes completed medication regime)? Yes   Does the patient have a primary care provider?  Yes   Does the patient have an appointment with their PCP within 7 days of discharge? Yes   Comments regarding PCP HOSP DC FU appt 3/4/22 @ 1:15 pm.    Has the patient kept scheduled appointments due by today? N/A   What is the Home health agency?  [facility] [place]   one  liz pending at d/c   Has home health visited the patient within 72 hours of discharge? Call prior to 72 hours   Psychosocial issues? No   Did the patient receive a copy of their discharge instructions? Yes   Nursing interventions Reviewed instructions with patient   What is the patient's perception of their health status since discharge? Improving   Is the patient/caregiver able to teach back signs and symptoms related to disease process for when to call PCP? Yes   Is the patient/caregiver able to teach back signs and symptoms related to disease process for when to call 911? Yes   Is the patient/caregiver able to teach back the hierarchy of who to call/visit for symptoms/problems? PCP, Specialist, Home health nurse, Urgent Care, ED, 911 Yes   If the patient is a current smoker, are they able to teach back resources for cessation? Not a smoker   TCM call completed? Yes   Wrap up additional comments Pt reports he is  plan of care explained/treatment delay explained/wait time explained improving and sites look ok. States ther eis small amt of drainage noted. Pt aware to call surgeon office if any issues,           Christine Zhang RN    3/1/2022, 13:01 EST

## 2022-03-08 ENCOUNTER — OFFICE VISIT (OUTPATIENT)
Dept: ONCOLOGY | Facility: CLINIC | Age: 42
End: 2022-03-08

## 2022-03-08 VITALS
SYSTOLIC BLOOD PRESSURE: 134 MMHG | OXYGEN SATURATION: 98 % | DIASTOLIC BLOOD PRESSURE: 85 MMHG | TEMPERATURE: 98.2 F | HEART RATE: 97 BPM | BODY MASS INDEX: 47.74 KG/M2 | WEIGHT: 315 LBS | RESPIRATION RATE: 18 BRPM | HEIGHT: 68 IN

## 2022-03-08 DIAGNOSIS — D49.89 THYMOMA: Primary | ICD-10-CM

## 2022-03-08 PROCEDURE — 99214 OFFICE O/P EST MOD 30 MIN: CPT | Performed by: INTERNAL MEDICINE

## 2022-03-08 NOTE — PROGRESS NOTES
DATE OF VISIT: 3/8/2022    REASON FOR VISIT: Followup for thymoma     PROBLEM LIST:  1.  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  Presented with shortness of breath and chest tightness  B.  CT chest done January 29 2022 revealed 8.8 cm mass right anterior mediastinum  C.  Diagnosed after CT-guided biopsy done February 1, 2022  D.  Status post thoracotomy with resection of mass done by Dr. Fu 2/23/2022  E.  Final pathology revealed 11.5 cm thymoma, encapsulated, clear surgical margins, 2 - lymph nodes.  2.  Morbid Obesity: BMI 52  3.  Heartburn    HISTORY OF PRESENT ILLNESS: The patient is a very pleasant 41 y.o. male  with past medical history significant for thymoma diagnosed January 2022.  He was treated with surgical resection.  The  patient is here today for scheduled follow-up visit.    SUBJECTIVE: The patient has been doing fairly well. he was able to tolerate  his treatment without any serious side effects. he denied any fever or  chills, no night sweats, denied any headaches    Past History:  Medical History: has a past medical history of Acromioclavicular separation (Roughly 20 years ago), ADHD (attention deficit hyperactivity disorder), Anxiety, Arthritis, Benign tumor of thymus, Depression, History of medical problems, Knee sprain (Over 20 years ago), Knee swelling (Early Dec.), Obesity, Seizures (HCC), Stroke (HCC), and Visual impairment.   Surgical History: has a past surgical history that includes Thymectomy (N/A, 2/23/2022).   Family History: family history includes Cancer in his father; Depression in his mother; Heart disease in his paternal grandfather; Hypertension in his father and mother; Kidney disease in his father and mother; Mental illness in his mother.   Social History: reports that he has never smoked. He has never used smokeless tobacco. He reports current alcohol use of about 4.0 standard drinks of alcohol per week. He reports that he does not use drugs.    (Not in a hospital admission)      Allergies: Patient has no known allergies.     Review of Systems   Constitutional: Positive for fatigue.   Cardiovascular: Positive for chest pain.   All other systems reviewed and are negative.        Current Outpatient Medications:   •  aspirin 81 MG chewable tablet, Chew 81 mg Daily., Disp: , Rfl:   •  cephalexin (Keflex) 500 MG capsule, Take 1 capsule by mouth 2 (Two) Times a Day for 7 days., Disp: 14 capsule, Rfl: 0  •  cyclobenzaprine (FLEXERIL) 10 MG tablet, Take 1 tablet by mouth 3 (Three) Times a Day As Needed for Muscle Spasms., Disp: 12 tablet, Rfl: 0  •  Diclofenac Sodium (VOLTAREN) 1 % gel gel, Apply 4 g topically to the appropriate area as directed 4 (Four) Times a Day As Needed (knee pain)., Disp: 100 g, Rfl: 1  •  furosemide (Lasix) 40 MG tablet, Take 1 tablet by mouth Daily for 5 days., Disp: 5 tablet, Rfl: 0  •  HYDROcodone-acetaminophen (Norco) 7.5-325 MG per tablet, Take 1 tablet by mouth Every 6 (Six) Hours As Needed for Moderate Pain ., Disp: 25 tablet, Rfl: 0  •  naproxen (NAPROSYN) 500 MG tablet, Take 1 tablet by mouth 2 (Two) Times a Day., Disp: 60 tablet, Rfl: 2  •  OXcarbazepine (Trileptal) 300 MG tablet, Take 1,200 mg by mouth 2 (Two) Times a Day., Disp: , Rfl:   •  pantoprazole (PROTONIX) 40 MG EC tablet, Take 1 tablet by mouth Every Morning., Disp: 30 tablet, Rfl: 0  •  potassium chloride (K-DUR,KLOR-CON) 20 MEQ CR tablet, Take 2 tablets by mouth Daily for 5 days., Disp: 10 tablet, Rfl: 0    PHYSICAL EXAMINATION:   There were no vitals taken for this visit.   There were no vitals filed for this visit.                  ECOG Performance Status: 1 - Symptomatic but completely ambulatory  General Appearance:  alert, cooperative, no apparent distress and appears stated age   Neurologic/Psychiatric: A&O x 3, gait steady, appropriate affect, strength 5/5 in all muscle groups   HEENT:  Normocephalic, without obvious abnormality, mucous membranes moist   Neck: Supple, symmetrical, trachea  midline, no adenopathy;  No thyromegaly, masses, or tenderness   Lungs:   Clear to auscultation bilaterally; respirations regular, even, and unlabored bilaterally   Heart:  Regular rate and rhythm, no murmurs appreciated   Abdomen:   Soft, non-tender, non-distended and no organomegaly   Lymph nodes: No cervical, supraclavicular, inguinal or axillary adenopathy noted   Extremities: Normal, atraumatic; no clubbing, cyanosis, or edema    Skin: No rashes, ulcers, or suspicious lesions noted     Lab on 03/04/2022   Component Date Value Ref Range Status   • Glucose 03/04/2022 100 (A) 65 - 99 mg/dL Final   • BUN 03/04/2022 13  6 - 20 mg/dL Final   • Creatinine 03/04/2022 0.85  0.76 - 1.27 mg/dL Final   • Sodium 03/04/2022 139  136 - 145 mmol/L Final   • Potassium 03/04/2022 4.1  3.5 - 5.2 mmol/L Final   • Chloride 03/04/2022 102  98 - 107 mmol/L Final   • CO2 03/04/2022 27.0  22.0 - 29.0 mmol/L Final   • Calcium 03/04/2022 9.1  8.6 - 10.5 mg/dL Final   • Total Protein 03/04/2022 6.9  6.0 - 8.5 g/dL Final   • Albumin 03/04/2022 4.00  3.50 - 5.20 g/dL Final   • ALT (SGPT) 03/04/2022 35  1 - 41 U/L Final   • AST (SGOT) 03/04/2022 35  1 - 40 U/L Final   • Alkaline Phosphatase 03/04/2022 50  39 - 117 U/L Final   • Total Bilirubin 03/04/2022 0.2  0.0 - 1.2 mg/dL Final   • Globulin 03/04/2022 2.9  gm/dL Final   • A/G Ratio 03/04/2022 1.4  g/dL Final   • BUN/Creatinine Ratio 03/04/2022 15.3  7.0 - 25.0 Final   • Anion Gap 03/04/2022 10.0  5.0 - 15.0 mmol/L Final   • eGFR 03/04/2022 113.4  >60.0 mL/min/1.73 Final    National Kidney Foundation and American Society of Nephrology (ASN) Task Force recommended calculation based on the Chronic Kidney Disease Epidemiology Collaboration (CKD-EPI) equation refit without adjustment for race.   • WBC 03/04/2022 5.70  3.40 - 10.80 10*3/mm3 Final   • RBC 03/04/2022 3.36 (A) 4.14 - 5.80 10*6/mm3 Final   • Hemoglobin 03/04/2022 10.2 (A) 13.0 - 17.7 g/dL Final   • Hematocrit 03/04/2022 31.5 (A)  37.5 - 51.0 % Final   • MCV 03/04/2022 93.8  79.0 - 97.0 fL Final   • MCH 03/04/2022 30.4  26.6 - 33.0 pg Final   • MCHC 03/04/2022 32.4  31.5 - 35.7 g/dL Final   • RDW 03/04/2022 12.6  12.3 - 15.4 % Final   • RDW-SD 03/04/2022 43.5  37.0 - 54.0 fl Final   • MPV 03/04/2022 9.1  6.0 - 12.0 fL Final   • Platelets 03/04/2022 349  140 - 450 10*3/mm3 Final   • Neutrophil % 03/04/2022 50.2  42.7 - 76.0 % Final   • Lymphocyte % 03/04/2022 24.9  19.6 - 45.3 % Final   • Monocyte % 03/04/2022 11.4  5.0 - 12.0 % Final   • Eosinophil % 03/04/2022 12.5 (A) 0.3 - 6.2 % Final   • Basophil % 03/04/2022 0.5  0.0 - 1.5 % Final   • Immature Grans % 03/04/2022 0.5  0.0 - 0.5 % Final   • Neutrophils, Absolute 03/04/2022 2.86  1.70 - 7.00 10*3/mm3 Final   • Lymphocytes, Absolute 03/04/2022 1.42  0.70 - 3.10 10*3/mm3 Final   • Monocytes, Absolute 03/04/2022 0.65  0.10 - 0.90 10*3/mm3 Final   • Eosinophils, Absolute 03/04/2022 0.71 (A) 0.00 - 0.40 10*3/mm3 Final   • Basophils, Absolute 03/04/2022 0.03  0.00 - 0.20 10*3/mm3 Final   • Immature Grans, Absolute 03/04/2022 0.03  0.00 - 0.05 10*3/mm3 Final   • nRBC 03/04/2022 0.0  0.0 - 0.2 /100 WBC Final   No results displayed because visit has over 200 results.           Pulmonary Function Test Spirometry    Result Date: 2/23/2022  Narrative: Impression: Normal spirometry. FEV1 is 3.19 L which is 94% of predicted. Electronically signed by Jose Alejandro Syed MD, 02/23/22, 1:17 PM EST.     XR Chest 1 View    Result Date: 2/28/2022  Narrative: EXAMINATION: XR CHEST 1 VW-  INDICATION: postop; X47-Qgbgygjjb neoplasm of thymus; J98.59-Other diseases of mediastinum, not elsewhere classified  TECHNIQUE: Frontal view of the chest  COMPARISON: 2/27/2022  FINDINGS:  The right thoracostomy tubes been removed. No evidence of pneumothorax. Persistent low lung volumes. Stable cardiomediastinal silhouette which appears accentuated in the setting of low lung volumes. Mild streaky left base atelectasis.       Impression:  Removal right thoracostomy tube. No pneumothorax.  Persistent low lung volumes.  This report was finalized on 2/28/2022 8:11 AM by Saulo Leger MD.      XR Chest 1 View    Result Date: 2/27/2022  Narrative:  DATE OF EXAM: 2/27/2022 5:21 AM  PROCEDURE: XR CHEST 1 VW-  INDICATIONS: postop; O07-Vhzdjfbwd neoplasm of thymus; J98.59-Other diseases of mediastinum, not elsewhere classified  COMPARISON: 02/26/2022 and prior  TECHNIQUE: Portable Chest  FINDINGS:  Study limited by overlying support monitoring apparatus. Study also limited by body habitus.  Lung volumes are low. Patient is status post median sternotomy. Heart size is enlarged. Pulmonary vascularity appears stable. Vascular crowding noted at the lung bases.  A right-sided chest tube, drain remains in place. Left basilar chest tube, drain is difficult to visualize but appears grossly stable. Bibasilar atelectasis is increased in comparison. Osseous structures appear stable      Impression: IMPRESSION : Low lung volume exam with bibasilar atelectasis. Chest tubes appear stable.  No definite pneumothorax.  Stable cardiomegaly [  This report was finalized on 2/27/2022 7:01 AM by Foster Ellsworth.      XR Chest 1 View    Result Date: 2/26/2022  Narrative: Examination: XR CHEST 1 VW-  Date of Exam: 2/26/2022 4:25 AM  Indication: postop; X33-Iwogvrgja neoplasm of thymus; J98.59-Other diseases of mediastinum, not elsewhere classified.  Comparison: Radiograph 02/25/2022, 02/24/2022  Technique: 1 view of the chest  Findings: The lung volumes are low. There are no airspace consolidations. No pleural effusions. No pneumothorax. The heart size is enlarged, stable as compared to the previous study. Median sternotomy wires are present.. The pulmonary vasculature appears within normal limits. No acute osseous abnormality identified.      Impression: Low lung volumes. No acute cardiopulmonary process.  This report was finalized on 2/26/2022 7:24 AM by Lizzie  MD Andrei.      XR Chest 1 View    Result Date: 2/25/2022  Narrative: DATE OF EXAM: 2/25/2022 7:24 AM  PROCEDURE: XR CHEST 1 VW-  INDICATIONS: postop; U02-Itkezftcw neoplasm of thymus; J98.59-Other diseases of mediastinum, not elsewhere classified  COMPARISON: 2/24/2022  TECHNIQUE: Single radiographic AP view of the chest was obtained.  FINDINGS: Cardiac size is enlarged and postoperative changes of prior sternotomy. There continues to be atelectasis or scarring in the right lung base with small bilateral pleural effusions. The pulmonary vascular markings are normal. There are low lung volumes present.      Impression:  1. No significant interim change. 2. Continued right basilar atelectasis and small bilateral pleural effusions.  This report was finalized on 2/25/2022 7:40 AM by Jose Deleon MD.      XR Chest 1 View    Result Date: 2/24/2022  Narrative: EXAMINATION: XR CHEST 1 VW-  INDICATION: postop; Y44-Xdagbxukg neoplasm of thymus; J98.59-Other diseases of mediastinum, not elsewhere classified  COMPARISON: One day prior  FINDINGS: Support hardware projects unchanged. Persistent low lung volumes with mildly increased atelectasis and probable small bilateral pleural effusions. There is no distinct pneumothorax. Unchanged heart and mediastinal contours.      Impression: Support hardware projects unchanged. Persistent low lung volumes with mildly increased atelectasis and probable small bilateral pleural effusions. There is no distinct pneumothorax. Unchanged heart and mediastinal contours.  This report was finalized on 2/24/2022 9:29 AM by Jose Post.      XR Chest 1 View    Result Date: 2/23/2022  Narrative: DATE OF EXAM: 2/23/2022 2:40 PM  PROCEDURE: XR CHEST 1 VW-  INDICATIONS: postop; A81-Lvcbitkek neoplasm of thymus; J98.59-Other diseases of mediastinum, not elsewhere classified  COMPARISON: 02/21/2022  TECHNIQUE: Single radiographic AP view of the chest was obtained.  FINDINGS: Sternotomy wires are  noted. The depth of inspiration is poor. Suggested mass abutting the right heart border areas not seen on the current exam. There may be some bibasilar atelectasis. It looks like there may be a drain at the left base and possibly the right lung base. There is no pneumothorax.      Impression: 1.  Poor inspiration. 2.  Probable bibasilar atelectasis.  This report was finalized on 2/23/2022 3:12 PM by Jak Last MD.      FL Sniff Test    Result Date: 2/23/2022  Narrative: EXAMINATION: FL SNIFF TEST-  INDICATION: ? phrenic nerve paralysis, thymic carcinoma; C81-Juywcafyr neoplasm of thymus  TECHNIQUE: 42 seconds of fluoroscopic time was used for this exam. 3 associated fluoroscopic loops were saved. The patient was instructed to inhale forcefully and repeatedly through the nose under fluoroscopic observation.  COMPARISON: NONE  FINDINGS: There was normal excursion of the left hemidiaphragm during inspiration. There was limited excursion of the dome of the right hemidiaphragm during inspiration, however there was no evidence of paradoxical movement of the right hemidiaphragm. Limited excursion may represent mild fixation of the right hemidiaphragm, however there was no definite indication of paralysis. These results were discussed with Dr. Jose Fu.       Impression: 1. The left hemidiaphragm appears to be functioning appropriately 2. Limited excursion of the dome of the right hemidiaphragm during inspiration may represent mild fixation of the right hemidiaphragm. There was no evidence of paradoxical movement of the right hemidiaphragm.    This report was finalized on 2/23/2022 4:41 PM by Jsoe Post.      Peripheral Block    Result Date: 2/23/2022  Narrative: Shagufta Mcgee, CRNA     2/23/2022  3:26 PM Peripheral Block     Chest X-Ray PA & Lateral    Result Date: 2/21/2022  Narrative: DATE OF EXAM: 2/21/2022 5:13 PM  PROCEDURE: XR CHEST PA AND LATERAL-  INDICATIONS: Pre-Op Cardiac Surgery; W03-Ngzcphjic  neoplasm of thymus  COMPARISON: 01/29/2022 at 2:13 PM, CT chest 01/29/2022.  TECHNIQUE: Two radiologic views of the chest , PA and lateral were obtained.  FINDINGS: No new consolidations or pleural effusions are observed. The cardiac silhouette and mediastinum are unchanged. The known mediastinal mass appears stable. No acute osseous abnormalities are seen.      Impression: 1.  No change from the previous study with no evidence for acute cardiopulmonary process. 2.  The known mediastinal mass appears stable.  This report was finalized on 2/21/2022 5:38 PM by Cabrera Jewell MD.      Stress Test With Pet Myocardial Perfusion (Multi Study)    Result Date: 2/10/2022  Narrative: · Left ventricular ejection fraction is normal. (Calculated EF = 61%). · Myocardial perfusion imaging indicates a normal myocardial perfusion study with no evidence of ischemia. · Impressions are consistent with a low risk study.      Arterial Line    Result Date: 2/23/2022  Narrative: Miley Corbin MD     2/23/2022 11:33 AM Arterial Line Patient reassessed immediately prior to procedure Patient location during procedure: OR Start time: 2/23/2022 10:10 AM Line placed for hemodynamic monitoring, ABGs/Labs/ISTAT and MD/Surgeon request. Performed By Anesthesiologist: Miley Corbin MD Preanesthetic Checklist Completed: patient identified, IV checked, site marked, risks and benefits discussed, surgical consent, monitors and equipment checked, pre-op evaluation and timeout performed Arterial Line Prep Sterile Tech: cap, gloves, mask and sterile barriers Prep: ChloraPrep Patient monitoring: blood pressure monitoring, continuous pulse oximetry and EKG Arterial Line Procedure Laterality:right Location:  radial artery Catheter size: 20 G Guidance: landmark technique and palpation technique Number of attempts: 1 Successful placement: yes Post Assessment Dressing Type: biopatch applied, line sutured, occlusive dressing applied, secured with tape  and wrist guard applied. Complications no Circ/Move/Sens Assessment: normal. Patient Tolerance: patient tolerated the procedure well with no apparent complications     Central Line    Result Date: 2/23/2022  Narrative: Miley Corbin MD     2/23/2022 11:34 AM Central Line Patient reassessed immediately prior to procedure Patient location during procedure: OR Indications: vascular access Staff Anesthesiologist: Miley Corbin MD Preanesthetic Checklist Completed: patient identified, IV checked, site marked, risks and benefits discussed, surgical consent, monitors and equipment checked, pre-op evaluation and timeout performed Central Line Prep Sterile Tech:cap, gloves, gown, mask and sterile barriers Prep: chloraprep Patient monitoring: blood pressure monitoring, continuous pulse oximetry and EKG Central Line Procedure Laterality:right Location:internal jugular Catheter Type:MAC Catheter Size:9 Fr Guidance:ultrasound guided PROCEDURE NOTE/ULTRASOUND INTERPRETATION.  Using ultrasound guidance the potential vascular sites for insertion of the catheter were visualized to determine the patency of the vessel to be used for vascular access.  After selecting the appropriate site for insertion, the needle was visualized under ultrasound being inserted into the internal jugular vein, followed by ultrasound confirmation of wire and catheter placement. There were no abnormalities seen on ultrasound; an image was taken; and the patient tolerated the procedure with no complications. Images: still images obtained, printed/placed on chart Assessment Post procedure:biopatch applied, line sutured, occlusive dressing applied and secured with tape Assessement:blood return through all ports, free fluid flow, chest x-ray ordered and Timothy Test Complications:no Patient Tolerance:patient tolerated the procedure well with no apparent complications       ASSESSMENT: The patient is a very pleasant 41 y.o. male  with  thymoma      PLAN:    1.  Thymoma:  A.  I did go over the final pathology report in details with the patient I explained to him that he has stage I disease.  B.  While the mass was massive more than 11 cm in size, the tumor was still encapsulated with a clear margins and negative nodes.  C.  The patient does not require adjuvant treatment at this point.  D.  I will see the patient in 6 months CT chest if everything remains stable I will switch him to annual visits at that point.    2.  Postoperative pain:  A.  The patient was advised to use over-the-counter medicine.    3.  Morbid obesity:  A.  BMI 52.  B. Patient was advised to exercise and lose weight    I discussed the case with Dr. Fu from cardiothoracic surgery to coordinate patient's care.    FOLLOW UP: 6 months CT chest.    Marian Wiggins MD  3/8/2022

## 2022-03-09 ENCOUNTER — READMISSION MANAGEMENT (OUTPATIENT)
Dept: CALL CENTER | Facility: HOSPITAL | Age: 42
End: 2022-03-09

## 2022-03-09 NOTE — OUTREACH NOTE
Medical Week 2 Survey    Flowsheet Row Responses   [facility] [place] patient discharged from? [place]   Does the patient have one of the following disease processes/diagnoses(primary or secondary)? Other   Week 2 attempt successful? No   Unsuccessful attempts Attempt 1          LUCHO MORALES - Registered Nurse

## 2022-03-11 ENCOUNTER — OFFICE VISIT (OUTPATIENT)
Dept: FAMILY MEDICINE CLINIC | Facility: CLINIC | Age: 42
End: 2022-03-11

## 2022-03-11 VITALS
DIASTOLIC BLOOD PRESSURE: 88 MMHG | WEIGHT: 315 LBS | SYSTOLIC BLOOD PRESSURE: 156 MMHG | HEIGHT: 68 IN | HEART RATE: 103 BPM | BODY MASS INDEX: 47.74 KG/M2 | OXYGEN SATURATION: 96 %

## 2022-03-11 DIAGNOSIS — J98.59 MEDIASTINAL MASS: ICD-10-CM

## 2022-03-11 DIAGNOSIS — Z78.9 PRESENCE OF SURGICAL INCISION: ICD-10-CM

## 2022-03-11 DIAGNOSIS — M79.89 SWELLING OF LOWER EXTREMITY: ICD-10-CM

## 2022-03-11 DIAGNOSIS — I10 PRIMARY HYPERTENSION: Primary | ICD-10-CM

## 2022-03-11 PROCEDURE — 99214 OFFICE O/P EST MOD 30 MIN: CPT | Performed by: INTERNAL MEDICINE

## 2022-03-11 RX ORDER — HYDROCHLOROTHIAZIDE 12.5 MG/1
12.5 TABLET ORAL DAILY
Qty: 90 TABLET | Refills: 1 | Status: SHIPPED | OUTPATIENT
Start: 2022-03-11 | End: 2022-04-08

## 2022-03-11 NOTE — PROGRESS NOTES
Chief Complaint   Patient presents with   • Drainage from Incision   • Chest Pain     Was told this was normal after procedure ; SOB       HPI:  Jose [place] is a 41 y.o. male who presents today for follow-up incision drainage and lower extremity swelling.  Drainage and redness has significantly improved on surgical incision.  Swelling has improved on Lasix therapy.    ROS:  Constitutional: no fevers, night sweats or unexplained weight loss  Eyes: no vision changes  ENT: no runny nose, ear pain, sore throat  Cardio: no chest pain, palpitations  Pulm: no shortness of breath, wheezing, or cough  GI: no abdominal pain or changes in bowel movements  : no difficulty urinating  MSK: no difficulty ambulating, no joint pain  Neuro: no weakness, dizziness or headache  Psych: no trouble sleeping  Endo: no change in appetite      Past Medical History:   Diagnosis Date   • Acromioclavicular separation Roughly 20 years ago    High school football injury   • ADHD (attention deficit hyperactivity disorder)    • Anxiety    • Arthritis    • Benign tumor of thymus    • Depression    • History of medical problems     Psuedotumor cerebri/Intracranial hypertension   • Knee sprain Over 20 years ago    Old injury   • Knee swelling Early Dec.    Recent X-Ray for this visit   • Obesity    • Seizures (HCC)     having seizures once every two weeks x 2 years   • Stroke (HCC)     Mini-stroke/TIA   • Visual impairment       Family History   Problem Relation Age of Onset   • Hypertension Mother    • Mental illness Mother    • Kidney disease Mother    • Depression Mother    • Hypertension Father    • Kidney disease Father    • Cancer Father    • Heart disease Paternal Grandfather       Social History     Socioeconomic History   • Marital status: Single   • Number of children: 0   Tobacco Use   • Smoking status: Never Smoker   • Smokeless tobacco: Never Used   Vaping Use   • Vaping Use: Some days   • Substances: CBD   • Devices:  Pre-filled or refillable cartridge   Substance and Sexual Activity   • Alcohol use: Yes     Alcohol/week: 4.0 standard drinks     Types: 2 Cans of beer, 2 Standard drinks or equivalent per week   • Drug use: Never   • Sexual activity: Yes     Partners: Female     Birth control/protection: None      No Known Allergies   Immunization History   Administered Date(s) Administered   • COVID-19 (PFIZER) PURPLE CAP 03/25/2021, 04/16/2021   • FluLaval/Fluarix/Fluzone >6 01/05/2021, 01/31/2022   • Tdap 09/02/2020        PE:  Vitals:    03/11/22 1108   BP: 156/88   Pulse: 103   SpO2: 96%      Body mass index is 52.62 kg/m².    Gen Appearance: NAD  HEENT: Normocephalic, PERRLA, no thyromegaly, trache midline  Heart: RRR, normal S1 and S2, no murmur  Lungs: CTA b/l, no wheezing, no crackles  Abdomen: Soft, non-tender, non-distended, no guarding and BSx4  MSK: Moves all extremities well, normal gait, no peripheral edema  Pulses: Palpable and equal b/l  Lymph nodes: No palpable lymphadenopathy   Neuro: No focal deficits      Current Outpatient Medications   Medication Sig Dispense Refill   • aspirin 81 MG chewable tablet Chew 81 mg Daily.     • cyclobenzaprine (FLEXERIL) 10 MG tablet Take 1 tablet by mouth 3 (Three) Times a Day As Needed for Muscle Spasms. 12 tablet 0   • Diclofenac Sodium (VOLTAREN) 1 % gel gel Apply 4 g topically to the appropriate area as directed 4 (Four) Times a Day As Needed (knee pain). 100 g 1   • HYDROcodone-acetaminophen (Norco) 7.5-325 MG per tablet Take 1 tablet by mouth Every 6 (Six) Hours As Needed for Moderate Pain . 25 tablet 0   • naproxen (NAPROSYN) 500 MG tablet Take 1 tablet by mouth 2 (Two) Times a Day. 60 tablet 2   • OXcarbazepine (Trileptal) 300 MG tablet Take 1,200 mg by mouth 2 (Two) Times a Day.     • pantoprazole (PROTONIX) 40 MG EC tablet Take 1 tablet by mouth Every Morning. 30 tablet 0   • hydroCHLOROthiazide (HYDRODIURIL) 12.5 MG tablet Take 1 tablet by mouth Daily. 90 tablet 1      No current facility-administered medications for this visit.      Swelling much better after Lasix therapy.  Blood pressure uncontrolled today.  Starting on HCTZ daily.  See back in 1 month for blood pressure check.  Incision significantly improved on exam today.    Diagnoses and all orders for this visit:    1. Primary hypertension (Primary)    2. Mediastinal mass    3. Swelling of lower extremity    4. Presence of surgical incision    Other orders  -     hydroCHLOROthiazide (HYDRODIURIL) 12.5 MG tablet; Take 1 tablet by mouth Daily.  Dispense: 90 tablet; Refill: 1         Return in about 4 weeks (around 4/8/2022) for Annual.     Dictated Utilizing Dragon Dictation    Please note that portions of this note were completed with a voice recognition program.    Part of this note may be an electronic transcription/translation of spoken language to printed text using the Dragon Dictation System.

## 2022-03-14 ENCOUNTER — READMISSION MANAGEMENT (OUTPATIENT)
Dept: CALL CENTER | Facility: HOSPITAL | Age: 42
End: 2022-03-14

## 2022-03-14 NOTE — OUTREACH NOTE
Medical Week 2 Survey    Flowsheet Row Responses   [facility] patient discharged from? [place]   Does the patient have one of the following disease processes/diagnoses(primary or secondary)? Other   Week 2 attempt successful? No   Unsuccessful attempts Attempt 2          KRISTIN Del Real Registered Nurse

## 2022-03-22 ENCOUNTER — OFFICE VISIT (OUTPATIENT)
Dept: CARDIAC SURGERY | Facility: CLINIC | Age: 42
End: 2022-03-22

## 2022-03-22 VITALS
SYSTOLIC BLOOD PRESSURE: 130 MMHG | HEART RATE: 97 BPM | HEIGHT: 68 IN | DIASTOLIC BLOOD PRESSURE: 82 MMHG | BODY MASS INDEX: 47.74 KG/M2 | WEIGHT: 315 LBS | TEMPERATURE: 98.2 F | OXYGEN SATURATION: 98 %

## 2022-03-22 DIAGNOSIS — J90 RECURRENT RIGHT PLEURAL EFFUSION: Primary | ICD-10-CM

## 2022-03-22 DIAGNOSIS — C37 THYMIC CARCINOMA: ICD-10-CM

## 2022-03-22 PROCEDURE — 71046 X-RAY EXAM CHEST 2 VIEWS: CPT | Performed by: NURSE PRACTITIONER

## 2022-03-22 PROCEDURE — 99024 POSTOP FOLLOW-UP VISIT: CPT | Performed by: NURSE PRACTITIONER

## 2022-03-22 RX ORDER — POTASSIUM CHLORIDE 750 MG/1
10 CAPSULE, EXTENDED RELEASE ORAL DAILY
Qty: 10 CAPSULE | Refills: 0 | Status: SHIPPED | OUTPATIENT
Start: 2022-03-22 | End: 2022-04-08

## 2022-03-22 RX ORDER — FUROSEMIDE 20 MG/1
TABLET ORAL
Qty: 15 TABLET | Refills: 0 | Status: SHIPPED | OUTPATIENT
Start: 2022-03-22 | End: 2022-04-08

## 2022-03-22 NOTE — PROGRESS NOTES
[facility]oth[facility] Office Follow Up Note     Date of Encounter: 2022     Name: Jose Hardin  : 1980     Referred By: No ref. 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 Patient states breathing has improved over time.        Subjective      History of Present Illness:    Jose Hardin is a very pleasant 41 y.o. male electronic smoker with EtOH use and history of morbid obesity, ADHD, seizures and thymic neoplasm incidentally found after ED visit for chest and back pain diagnosed via mediastinal needle biopsy who is now s/p thymectomy via sternotomy on 2022 by Dr. Fu.  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  Patient had no postoperative complications and was discharged on POD #5 with as needed home oxygen and has had no readmissions.  Patient contacted our office 3/3/2022 with complaints of brown drainage from sternal incision.  He was asked to come in for eval but declined stating he had a appointment with his PCP the following day. His PCP put him on an antibiotic x 5 days. He has since had no issues but one area of his incision remains open. He has no complaints of uncontrolled pain. He does have some FULLER but has been using his IS pulling volume sup to 2500.     Review of Systems:  Review of Systems   Constitutional: Negative for chills, decreased appetite, fever and malaise/fatigue.   Cardiovascular: Positive for dyspnea on exertion. Negative for chest pain, claudication, irregular heartbeat, leg swelling, near-syncope, orthopnea, palpitations and syncope.        Chest tenderness     Respiratory: Positive for wheezing. Negative for cough, hemoptysis, shortness of breath and sputum production.    Hematologic/Lymphatic: Negative for bleeding  problem. Does not bruise/bleed easily.   Skin: Negative for color change, poor wound healing and rash.   Musculoskeletal: Positive for back pain. Negative for falls and joint pain.   Gastrointestinal: Negative for abdominal pain, constipation, diarrhea, nausea and vomiting.   Neurological: Positive for numbness (left 5th finger ). Negative for focal weakness and paresthesias.   Psychiatric/Behavioral: Negative for depression. The patient does not have insomnia.        I have reviewed the following portions of the patient's history: allergies, current medications, past family history, past medical history, past social history, past surgical history and problem list and confirm it's accurate.    Allergies:  No Known Allergies    Medications:      Current Outpatient Medications:   •  aspirin 81 MG chewable tablet, Chew 81 mg Daily., Disp: , Rfl:   •  cyclobenzaprine (FLEXERIL) 10 MG tablet, Take 1 tablet by mouth 3 (Three) Times a Day As Needed for Muscle Spasms., Disp: 12 tablet, Rfl: 0  •  Diclofenac Sodium (VOLTAREN) 1 % gel gel, Apply 4 g topically to the appropriate area as directed 4 (Four) Times a Day As Needed (knee pain)., Disp: 100 g, Rfl: 1  •  hydroCHLOROthiazide (HYDRODIURIL) 12.5 MG tablet, Take 1 tablet by mouth Daily., Disp: 90 tablet, Rfl: 1  •  OXcarbazepine (Trileptal) 300 MG tablet, Take 1,200 mg by mouth 2 (Two) Times a Day., Disp: , Rfl:   •  furosemide (Lasix) 20 MG tablet, 2 tablets p.o. once daily x5 days followed by 1 tab p.o. once daily x5 days, Disp: 15 tablet, Rfl: 0  •  potassium chloride (MICRO-K) 10 MEQ CR capsule, Take 1 capsule by mouth Daily., Disp: 10 capsule, Rfl: 0    History:   Past Medical History:   Diagnosis Date   • Acromioclavicular separation Roughly 20 years ago    High school football injury   • ADHD (attention deficit hyperactivity disorder)    • Anxiety    • Arthritis    • Benign tumor of thymus    • Depression    • History of medical problems     Psuedotumor  "cerebri/Intracranial hypertension   • Knee sprain Over 20 years ago    Old injury   • Knee swelling Early Dec.    Recent X-Ray for this visit   • Obesity    • Seizures (HCC)     having seizures once every two weeks x 2 years   • Stroke (HCC)     Mini-stroke/TIA   • Visual impairment        Past Surgical History:   Procedure Laterality Date   • THYMECTOMY N/A 2/23/2022    Procedure: STERNOTOMY WITH THYMECTOMY;  Surgeon: Jose Fu MD;  Location: [facility];  Service: Cardiothoracic;  Laterality: N/A;       Social History     Socioeconomic History   • Marital status: Single   • Number of children: 0   Tobacco Use   • Smoking status: Never Smoker   • Smokeless tobacco: Never Used   Vaping Use   • Vaping Use: Some days   • Substances: CBD   • Devices: Pre-filled or refillable cartridge   Substance and Sexual Activity   • Alcohol use: Yes     Alcohol/week: 4.0 standard drinks     Types: 2 Cans of beer, 2 Standard drinks or equivalent per week   • Drug use: Never   • Sexual activity: Yes     Partners: Female     Birth control/protection: None        Family History   Problem Relation Age of Onset   • Hypertension Mother    • Mental illness Mother    • Kidney disease Mother    • Depression Mother    • Hypertension Father    • Kidney disease Father    • Cancer Father    • Heart disease Paternal Grandfather        Objective   Physical Exam:  Vitals:    03/22/22 0908   BP: 130/82   BP Location: Right arm   Patient Position: Sitting   Pulse: 97   Temp: 98.2 °F (36.8 °C)   SpO2: 98%   Weight: (!) 160 kg (353 lb 6.4 oz)   Height: 172.7 cm (68\")      Body mass index is 53.73 kg/m².    Physical Exam  Vitals and nursing note reviewed.   Constitutional:       Appearance: Normal appearance. He is morbidly obese.   HENT:      Head: Normocephalic and atraumatic.   Eyes:      Pupils: Pupils are equal, round, and reactive to light.   Neck:      Vascular: No carotid bruit.   Cardiovascular:      Rate and Rhythm: Normal rate and regular " rhythm.      Pulses: Normal pulses.      Heart sounds: Normal heart sounds, S1 normal and S2 normal. No murmur heard.  Pulmonary:      Effort: Pulmonary effort is normal.      Breath sounds: Normal breath sounds.   Abdominal:      Palpations: Abdomen is soft.   Musculoskeletal:         General: Normal range of motion.      Cervical back: Normal range of motion and neck supple.      Right lower leg: No edema.      Left lower leg: No edema.   Skin:     General: Skin is warm and dry.      Capillary Refill: Capillary refill takes less than 2 seconds.      Comments: Mid-sternotomy incision: wound edges well approximated with the exception of most proximal incision where 1.5 cm superficial dehiscence is noted, no erythema, edema, or induration.  Fibrinous exudate present.  Mediastinal tubes sites: well healing without erythema, edema, or drainage.  Sutures intact     Neurological:      General: No focal deficit present.      Mental Status: He is alert and oriented to person, place, and time. Mental status is at baseline.      GCS: GCS eye subscore is 4. GCS verbal subscore is 5. GCS motor subscore is 6.      Motor: Motor function is intact.      Coordination: Coordination is intact.      Gait: Gait is intact.   Psychiatric:         Mood and Affect: Mood normal.         Speech: Speech normal.         Behavior: Behavior normal. Behavior is cooperative.         Cognition and Memory: Cognition normal.         Imaging/Labs:  XR Chest 03/22/2022: Improvement in atelectasis from comparison with right small pleural effusion.  No signs of pneumothorax.. Sternal wires intact. Cardiomediastinal structures appear within normal limits. Personally reviewed.  Official radiology report pending    CTA Chest-1/29/2022  No evidence of pulmonary embolism.  7.1 x 8.7 x 8.8 cm homogeneous lobulated circumscribed soft tissue mass of the anterior mediastinum. No evidence of pleural/chest wall or pericardial invasion. This appears separate from  the thyroid gland. The appearance is nonspecific but differential considerations include lymphoma and thymoma or less likely teratoma. No definite evidence of separate mediastinal or hilar adenopathy or separate discrete pleural soft tissue lesions. This lesion will likely need biopsy and recommend referral to appropriate service. Further evaluation with PET/CT scan may be of value.    XR Chest 1 View-1/29/2022  Approximately 10 cm right midlung mass and elevation of the right hemidiaphragm. CT scan evaluation is suggested.    Assessment / Plan      Assessment / Plan:  Diagnoses and all orders for this visit:    1. Recurrent right pleural effusion (Primary)    2. Thymic carcinoma (HCC)    Other orders  -     potassium chloride (MICRO-K) 10 MEQ CR capsule; Take 1 capsule by mouth Daily.  Dispense: 10 capsule; Refill: 0  -     furosemide (Lasix) 20 MG tablet; 2 tablets p.o. once daily x5 days followed by 1 tab p.o. once daily x5 days  Dispense: 15 tablet; Refill: 0       · Thymic neoplasm incidentally found after ED visit for chest and back pain diagnosed via mediastinal needle biopsy who is now s/p thymectomy via sternotomy on 2/23/2022 by Dr. Fu.    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    · No postoperative complications and was discharged on POD #5 with as needed home oxygen and has had no readmissions.    · Placed on unknown antibiotic by PCP postoperatively for sternal drainage.  · On exam most proximal portion of incision with 1.5 cm superficial dehiscence without surrounding erythema, purulent drainage, or underlying induration/fluctuation.  Remainder of sternal incision without complication.  MTS sutures removed without issue.  · CXR in clinic with small right pleural effusion.  Patient is relatively asymptomatic only complaining of FULLER when going upstairs.  Will provide short course of oral diuretics.  Patient has been encouraged to increase ambulation  and continue compliance with IS  · Return in 3 weeks for repeat CXR  · Being followed by oncology Dr Wiggins last visit 3/8/2022 with no adjuvant treatment required for his stage I disease and plans for 6-month CT chest followed by annual surveillance thereafter  · Will plan to follow along for 6-month postop imaging.    Follow Up:   Return in about 3 weeks (around 4/12/2022) for Imaging next visit: CXR.   Or sooner for any further concerns or worsening sign and symptoms. If unable to reach us in the office please dial 911 or go to the nearest emergency department.      Janine STEWARD  [facility] Cardiothoracic Surgery       Answers for HPI/ROS submitted by the patient on 3/20/2022  What is the primary reason for your visit?: Other  Please describe your symptoms.: This is a post-surgery visit, but recently I have has some pain in my abdominal area on the right side, right beneath my right breast area, pain underneath my right arm (armpit area) and then down in my right ribcage area as well. Some pain in my chest around the incision and then also towards the left side, around the heart area  Have you had these symptoms before?: Yes  How long have you been having these symptoms?: 1-4 days  Please list any medications you are currently taking for this condition.: None for this condition. I was prescribed pain meds but have not been taking.  Please describe any probable cause for these symptoms. : Recently had Thymectomy surgery 2/23.

## 2022-03-25 ENCOUNTER — TELEPHONE (OUTPATIENT)
Dept: FAMILY MEDICINE CLINIC | Facility: CLINIC | Age: 42
End: 2022-03-25

## 2022-03-25 NOTE — TELEPHONE ENCOUNTER
Caller: ROCK    Relationship: Other PHYSICAL THERAPIST FOR NURSE ON CALL    Best call back number:     What is the best time to reach you: ANYTIME    Who are you requesting to speak with (clinical staff, provider,  specific staff member):  CLINICAL STAFF    Do you know the name of the person who called:  ROCK       What was the call regarding: PATIENT WAS DISCHARGED YESTERDAY FROM HOME HEALTH AS HAVING MET ALL OF HIS GOALS OF PT    Do you require a callback: NO

## 2022-04-06 RX ORDER — NAPROXEN 500 MG/1
TABLET ORAL
Qty: 60 TABLET | Refills: 2 | OUTPATIENT
Start: 2022-04-06

## 2022-04-08 ENCOUNTER — OFFICE VISIT (OUTPATIENT)
Dept: FAMILY MEDICINE CLINIC | Facility: CLINIC | Age: 42
End: 2022-04-08

## 2022-04-08 VITALS
WEIGHT: 315 LBS | BODY MASS INDEX: 47.74 KG/M2 | OXYGEN SATURATION: 98 % | HEIGHT: 68 IN | DIASTOLIC BLOOD PRESSURE: 88 MMHG | SYSTOLIC BLOOD PRESSURE: 134 MMHG | HEART RATE: 63 BPM

## 2022-04-08 DIAGNOSIS — Z00.00 PREVENTATIVE HEALTH CARE: Primary | ICD-10-CM

## 2022-04-08 DIAGNOSIS — I10 PRIMARY HYPERTENSION: ICD-10-CM

## 2022-04-08 PROCEDURE — 99395 PREV VISIT EST AGE 18-39: CPT | Performed by: INTERNAL MEDICINE

## 2022-04-08 RX ORDER — LOSARTAN POTASSIUM AND HYDROCHLOROTHIAZIDE 12.5; 5 MG/1; MG/1
1 TABLET ORAL DAILY
Qty: 90 TABLET | Refills: 1 | Status: SHIPPED | OUTPATIENT
Start: 2022-04-08 | End: 2022-09-09 | Stop reason: SDUPTHER

## 2022-04-08 NOTE — PROGRESS NOTES
Chief Complaint   Patient presents with   • Annual Exam   • Hypertension     F/u       HPI:  Jose Hardin is a 41 y.o. male who presents today for annual exam.  Patient reports borderline high blood pressure readings at home over the last month.    ROS:  Constitutional: no fevers, night sweats or unexplained weight loss  Eyes: no vision changes  ENT: no runny nose, ear pain, sore throat  Cardio: no chest pain, palpitations  Pulm: no shortness of breath, wheezing, or cough  GI: no abdominal pain or changes in bowel movements  : no difficulty urinating  MSK: no difficulty ambulating, no joint pain  Neuro: no weakness, dizziness or headache  Psych: no trouble sleeping  Endo: no change in appetite      Past Medical History:   Diagnosis Date   • Acromioclavicular separation Roughly 20 years ago    High school football injury   • ADHD (attention deficit hyperactivity disorder)    • Anxiety    • Arthritis    • Asthma 1/2019    cpap breathing machine   • Benign tumor of thymus    • Depression    • History of medical problems     Psuedotumor cerebri/Intracranial hypertension   • Knee sprain Over 20 years ago    Old injury   • Knee swelling Early Dec.    Recent X-Ray for this visit   • Obesity    • Seizures (HCC)     having seizures once every two weeks x 2 years   • Stroke (HCC)     Mini-stroke/TIA   • Visual impairment       Family History   Problem Relation Age of Onset   • Hypertension Mother    • Mental illness Mother    • Kidney disease Mother    • Depression Mother    • Hypertension Father    • Kidney disease Father    • Cancer Father    • Heart disease Paternal Grandfather       Social History     Socioeconomic History   • Marital status: Single   • Number of children: 0   Tobacco Use   • Smoking status: Never Smoker   • Smokeless tobacco: Never Used   Vaping Use   • Vaping Use: Some days   • Substances: CBD   • Devices: Pre-filled or refillable cartridge   Substance and Sexual Activity   • Alcohol  use: Yes     Alcohol/week: 4.0 standard drinks     Types: 2 Cans of beer, 2 Drinks containing 0.5 oz of alcohol per week   • Drug use: Never   • Sexual activity: Yes     Partners: Female     Birth control/protection: None      No Known Allergies   Immunization History   Administered Date(s) Administered   • COVID-19 (PFIZER) PURPLE CAP 03/25/2021, 04/16/2021   • FluLaval/Fluarix/Fluzone >6 01/05/2021, 01/31/2022   • PPD Test 10/26/2001   • Tdap 09/02/2020        PE:  Vitals:    04/08/22 1006   BP: 134/88   Pulse: 63   SpO2: 98%      Body mass index is 52.31 kg/m².    Gen Appearance: NAD  HEENT: Normocephalic, PERRLA, no thyromegaly, trache midline  Heart: RRR, normal S1 and S2, no murmur  Lungs: CTA b/l, no wheezing, no crackles  Abdomen: Soft, non-tender, non-distended, no guarding and BSx4  MSK: Moves all extremities well, normal gait, no peripheral edema  Pulses: Palpable and equal b/l  Lymph nodes: No palpable lymphadenopathy   Neuro: No focal deficits      Current Outpatient Medications   Medication Sig Dispense Refill   • OXcarbazepine (Trileptal) 300 MG tablet Take 1,200 mg by mouth 2 (Two) Times a Day.     • losartan-hydrochlorothiazide (Hyzaar) 50-12.5 MG per tablet Take 1 tablet by mouth Daily. 90 tablet 1     No current facility-administered medications for this visit.        Diagnoses and all orders for this visit:    1. Preventative health care (Primary)  Counseled on healthy weight, nutrition, physical activity, cancer screening, and immunizations.    2. Primary hypertension  -     losartan-hydrochlorothiazide (Hyzaar) 50-12.5 MG per tablet; Take 1 tablet by mouth Daily.  Dispense: 90 tablet; Refill: 1  Uncontrolled, add on losartan 50 mg.  See back in 3 months for reassessment.       Return in about 3 months (around 7/8/2022) for htn.     Dictated Utilizing Dragon Dictation    Please note that portions of this note were completed with a voice recognition program.    Part of this note may be an  electronic transcription/translation of spoken language to printed text using the Dragon Dictation System.

## 2022-04-11 DIAGNOSIS — Z80.42 FAMILY HISTORY OF PROSTATE CANCER IN FATHER: Primary | ICD-10-CM

## 2022-04-11 DIAGNOSIS — Z12.5 ENCOUNTER FOR PROSTATE CANCER SCREENING: ICD-10-CM

## 2022-04-12 ENCOUNTER — OFFICE VISIT (OUTPATIENT)
Dept: CARDIAC SURGERY | Facility: CLINIC | Age: 42
End: 2022-04-12

## 2022-04-12 VITALS
SYSTOLIC BLOOD PRESSURE: 163 MMHG | WEIGHT: 315 LBS | BODY MASS INDEX: 47.74 KG/M2 | OXYGEN SATURATION: 98 % | HEIGHT: 68 IN | HEART RATE: 101 BPM | DIASTOLIC BLOOD PRESSURE: 90 MMHG | TEMPERATURE: 96.9 F

## 2022-04-12 DIAGNOSIS — C37 THYMIC CARCINOMA: ICD-10-CM

## 2022-04-12 PROCEDURE — 99024 POSTOP FOLLOW-UP VISIT: CPT | Performed by: NURSE PRACTITIONER

## 2022-04-12 PROCEDURE — 71046 X-RAY EXAM CHEST 2 VIEWS: CPT | Performed by: NURSE PRACTITIONER

## 2022-04-12 NOTE — PROGRESS NOTES
[facility] Cardiothoracic Surgery Office Follow Up Note     Date of Encounter: 2022     Name: Jose Hardin  : 1980     Referred By: No ref. provider found  PCP: Giovanny Coates DO    Chief Complaint:    Chief Complaint   Patient presents with   • Follow-up     3 week follow-up with repeat chest xray for right pleural effusion. 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 male electronic smoker with EtOH use and history of morbid obesity, ADHD, seizures and thymic neoplasm incidentally found after ED visit for chest and back pain diagnosed via mediastinal needle biopsy who is now s/p thymectomy via sternotomy on 2022 by Dr. Fu.  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  Patient had no postoperative complications and was discharged on POD #5 with as needed home oxygen and has had no readmissions.  Patient contacted our office 3/3/2022 with complaints of brown drainage from sternal incision.  He was asked to come in for eval but declined stating he had a appointment with his PCP the following day. His PCP put him on an antibiotic x 5 days.  He was seen 3/22/2022 with 1.5 cm of superficial dehiscence to proximal sternotomy incision and small right pleural effusion.  He was instructed on wound care and provided diuresis with instructions to increase ambulation and I-S.  He presents today as follow-up with repeat CXR.  He has since had complete closure of his sternotomy incision with no concerns for infection.  He has no respiratory complaints.  He was seen postoperatively by Dr Wiggins 3/8/2022 with no adjuvant treatment required for stage I disease and plans for 6-month CT chest followed by annual surveillance thereafter.    Review of Systems:  Review of Systems   Constitutional: Negative for chills, decreased  appetite, fever and malaise/fatigue.   Cardiovascular: Negative for chest pain, claudication, dyspnea on exertion, irregular heartbeat, leg swelling, near-syncope, orthopnea, palpitations and syncope.   Respiratory: Negative for cough, hemoptysis, shortness of breath, sputum production and wheezing.    Hematologic/Lymphatic: Negative for bleeding problem. Does not bruise/bleed easily.   Skin: Negative for color change, poor wound healing and rash.   Musculoskeletal: Positive for joint pain (left knee). Negative for back pain and falls.   Gastrointestinal: Negative for abdominal pain, constipation, diarrhea, nausea and vomiting.   Neurological: Positive for numbness (left 5th finger). Negative for focal weakness and paresthesias.   Psychiatric/Behavioral: Negative for depression. The patient does not have insomnia.        I have reviewed the following portions of the patient's history: allergies, current medications, past family history, past medical history, past social history, past surgical history and problem list and confirm it's accurate.    Allergies:  No Known Allergies    Medications:      Current Outpatient Medications:   •  losartan-hydrochlorothiazide (Hyzaar) 50-12.5 MG per tablet, Take 1 tablet by mouth Daily., Disp: 90 tablet, Rfl: 1  •  OXcarbazepine (Trileptal) 300 MG tablet, Take 1,200 mg by mouth 2 (Two) Times a Day., Disp: , Rfl:     History:   Past Medical History:   Diagnosis Date   • Acromioclavicular separation Roughly 20 years ago    High school football injury   • ADHD (attention deficit hyperactivity disorder)    • Anxiety    • Arthritis    • Asthma 1/2019    cpap breathing machine   • Benign tumor of thymus    • Depression    • History of medical problems     Psuedotumor cerebri/Intracranial hypertension   • Hypertension    • Knee sprain Over 20 years ago    Old injury   • Knee swelling Early Dec.    Recent X-Ray for this visit   • Obesity    • Seizures (HCC)     having seizures once every  "two weeks x 2 years   • Stroke (HCC)     Mini-stroke/TIA   • Visual impairment        Past Surgical History:   Procedure Laterality Date   • THYMECTOMY N/A 2/23/2022    Procedure: STERNOTOMY WITH THYMECTOMY;  Surgeon: Jose Fu MD;  Location: [facility];  Service: Cardiothoracic;  Laterality: N/A;       Social History     Socioeconomic History   • Marital status: Single   • Number of children: 0   Tobacco Use   • Smoking status: Never Smoker   • Smokeless tobacco: Never Used   Vaping Use   • Vaping Use: Some days   • Substances: CBD   • Devices: Pre-filled or refillable cartridge   Substance and Sexual Activity   • Alcohol use: Yes     Alcohol/week: 4.0 standard drinks     Types: 2 Cans of beer, 2 Drinks containing 0.5 oz of alcohol per week   • Drug use: Never   • Sexual activity: Yes     Partners: Female     Birth control/protection: None        Family History   Problem Relation Age of Onset   • Hypertension Mother    • Mental illness Mother    • Kidney disease Mother    • Depression Mother    • Hypertension Father    • Kidney disease Father    • Cancer Father    • Heart disease Paternal Grandfather        Objective   Physical Exam:  Vitals:    04/12/22 0902   BP: 163/90   BP Location: Right arm   Patient Position: Sitting   Pulse: 101   Temp: 96.9 °F (36.1 °C)   SpO2: 98%   Weight: (!) 160 kg (352 lb)   Height: 172.7 cm (68\")      Body mass index is 53.52 kg/m².    Physical Exam  Vitals and nursing note reviewed.   Constitutional:       Appearance: Normal appearance. He is morbidly obese.   HENT:      Head: Normocephalic and atraumatic.   Eyes:      Pupils: Pupils are equal, round, and reactive to light.   Neck:      Vascular: No carotid bruit.   Cardiovascular:      Rate and Rhythm: Normal rate and regular rhythm.      Pulses: Normal pulses.      Heart sounds: Normal heart sounds, S1 normal and S2 normal. No murmur heard.  Pulmonary:      Effort: Pulmonary effort is normal.      Breath sounds: Normal breath " sounds.   Abdominal:      Palpations: Abdomen is soft.   Musculoskeletal:         General: Normal range of motion.      Cervical back: Normal range of motion and neck supple.      Right lower leg: No edema.      Left lower leg: No edema.   Skin:     General: Skin is warm and dry.      Capillary Refill: Capillary refill takes less than 2 seconds.      Comments: Mid-sternotomy incision: previous superficial dehiscence closed with overlying scab, no erythema, edema, or induration.      Neurological:      General: No focal deficit present.      Mental Status: He is alert and oriented to person, place, and time. Mental status is at baseline.      GCS: GCS eye subscore is 4. GCS verbal subscore is 5. GCS motor subscore is 6.      Motor: Motor function is intact.      Coordination: Coordination is intact.      Gait: Gait is intact.   Psychiatric:         Mood and Affect: Mood normal.         Speech: Speech normal.         Behavior: Behavior normal. Behavior is cooperative.         Cognition and Memory: Cognition normal.         Imaging/Labs:  XR Chest 04/12/2022: Lungs fully expanded and well-inflated. No signs of pneumothorax, pleural effusion, or acute airspace consolidation noted. Sternal wires intact. Cardiomediastinal structures appear within normal limits. Personally reviewed.  Official radiology report pending    XR Chest 2 View-Result Date: 3/22/2022  1.  Right basilar density that could relate to pleural effusion. Some atelectasis in this area not excluded.  This report was finalized on 3/22/2022 9:58 AM by Jak Last MD.       CTA Chest-1/29/2022  No evidence of pulmonary embolism.  7.1 x 8.7 x 8.8 cm homogeneous lobulated circumscribed soft tissue mass of the anterior mediastinum. No evidence of pleural/chest wall or pericardial invasion. This appears separate from the thyroid gland. The appearance is nonspecific but differential considerations include lymphoma and thymoma or less likely teratoma. No definite  evidence of separate mediastinal or hilar adenopathy or separate discrete pleural soft tissue lesions. This lesion will likely need biopsy and recommend referral to appropriate service. Further evaluation with PET/CT scan may be of value.     XR Chest 1 View-1/29/2022  Approximately 10 cm right midlung mass and elevation of the right hemidiaphragm. CT scan evaluation is suggested.      Assessment / Plan      Assessment / Plan:  Diagnoses and all orders for this visit:    1. Thymic carcinoma (HCC)       · Thymic neoplasm incidentally found after ED visit for chest and back pain diagnosed via mediastinal needle biopsy who is now s/p thymectomy via sternotomy on 2/23/2022 by Dr. Fu.    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    · No postoperative complications and was discharged on POD #5 with as needed home oxygen and has had no readmissions.    · Placed on unknown antibiotic by PCP postoperatively for sternal drainage.  · Seen post-op eval 3/22 with .5 cm superficial dehiscence to proximal incision tx with wound care.  · CXR in clinic with small right pleural effusion with short course of oral diuretics, increase ambulation and continue compliance with IS  · Presents today as 3 weeks follow-up for repeat CXR with no respiratory complaints.    · On exam complete closure of previous dehiscence overlying scab no underlying induration/fluctuation with surrounding erythema  · CXR with resolution of right pleural effusion  · Being followed by oncology Dr Wiggins last visit 3/8/2022 with no adjuvant treatment required for his stage I disease and plans for 6-month CT chest followed by annual surveillance thereafter  · Will plan to follow along for 6-month postop imaging.  Pending imaging will PRN and defer to Dr Wiggins for annual surveillance  · Pt is agreeable to plan      Follow Up:   Return in about 6 months (around 10/12/2022) for Imaging next visit: CT Chest.   Or  sooner for any further concerns or worsening sign and symptoms. If unable to reach us in the office please dial 911 or go to the nearest emergency department.      Janine STEWARD  [facility] Cardiothoracic Surgery       Answers for HPI/ROS submitted by the patient on 4/11/2022  What is the primary reason for your visit?: Other  Please describe your symptoms.: Post-op follow-up visit.  Have you had these symptoms before?: No  How long have you been having these symptoms?: 1-4 days  Please list any medications you are currently taking for this condition.: None.  Please describe any probable cause for these symptoms. : N/A

## 2022-05-04 NOTE — PROGRESS NOTES
[facility] Cardiology    Encounter Date: 2022    Patient ID: Jose Hardin is a 41 y.o. male.  : 1980     PCP: Giovanny Coates DO       Chief Complaint: Chest Pain      PROBLEM LIST:  1. Chest pain  a. Gray zone/flat troponins: Chest pain felt to be from myopericarditis in the setting of mediastinal mass.  b. Echocardiogram 2022: EF 65%. Mild R atrial and R ventricular enlargement. Trace MR. Trace TR with normal RVSP.  c. PET/MPS 02/10/2022: EF 61%. Normal, low risk study.   2. Hyponatremia/resolved  3. Mediastinal mass/thymoma  a. S/p resection by Dr. Nickie rivas Followed by Dr. Wiggins, felt to have stage I disease with negative nodes, to be monitored clinically.  4. Class 3 obesity     History of Present Illness  Patient presents today for a follow-up with a history of chest pain and cardiac risk factors.  His chest pain was felt to be from myopericarditis in the setting of large mediastinal mass, subsequently had a cardiac PET scan which was negative for ischemia.  He has undergone thymoma resection and is recovering well.  He is back to normal activities including riding his bike.  Also watching his diet and trying to lose weight.  Has no current complaints of chest pain shortness of breath edema palpitations dizziness lightheadedness or syncope and feels quite well overall.     No Known Allergies      Current Outpatient Medications:   •  losartan-hydrochlorothiazide (Hyzaar) 50-12.5 MG per tablet, Take 1 tablet by mouth Daily., Disp: 90 tablet, Rfl: 1  •  OXcarbazepine (Trileptal) 300 MG tablet, Take 1,200 mg by mouth 2 (Two) Times a Day., Disp: , Rfl:     The following portions of the patient's history were reviewed and updated as appropriate: allergies, current medications, past family history, past medical history, past social history, past surgical history and problem list.    ROS  Review of Systems   Constitution: Negative for chills,  "fever, fatigue, generalized weakness.   Cardiovascular: Negative for chest pain, dyspnea on exertion, leg swelling, palpitations, orthopnea, and syncope.   Respiratory: Negative for cough, shortness of breath, and wheezing.  HENT: Negative for ear pain, nosebleeds, and tinnitus.  Gastrointestinal: Negative for abdominal pain, constipation, diarrhea, nausea and vomiting.   Genitourinary: No urinary symptoms.  Musculoskeletal: Negative for muscle cramps.  Neurological: Negative for dizziness, headaches, loss of balance, numbness, and symptoms of stroke.  Psychiatric: Normal mental status.     All other systems reviewed and are negative.        Objective:     /80 (BP Location: Right arm, Patient Position: Sitting)   Pulse 100   Ht 172.7 cm (68\")   Wt (!) 157 kg (346 lb)   SpO2 99%   BMI 52.61 kg/m²      Physical Exam  Constitutional: Patient appears well-developed and well-nourished.   HENT: HEENT exam unremarkable.   Neck: Neck supple. No JVD present. No carotid bruits.   Cardiovascular: Normal rate, regular rhythm and normal heart sounds. No murmur heard.   2+ symmetric pulses.   Pulmonary/Chest: Breath sounds normal. Does not exhibit tenderness.   Abdominal: Abdomen benign.   Musculoskeletal: Does not exhibit edema.   Neurological: Neurological exam unremarkable.   Vitals reviewed.    Data Review:   Lab Results   Component Value Date    GLUCOSE 100 (H) 03/04/2022    BUN 13 03/04/2022    CREATININE 0.85 03/04/2022    EGFRIFAFRI 125 02/28/2022    BCR 15.3 03/04/2022    K 4.1 03/04/2022    CO2 27.0 03/04/2022    CALCIUM 9.1 03/04/2022    ALBUMIN 4.00 03/04/2022    AST 35 03/04/2022    ALT 35 03/04/2022     Lab Results   Component Value Date    CHOL 152 01/30/2022    TRIG 26 01/30/2022    HDL 78 (H) 01/30/2022    LDL 67 01/30/2022      Lab Results   Component Value Date    WBC 5.70 03/04/2022    RBC 3.36 (L) 03/04/2022    HGB 10.2 (L) 03/04/2022    HCT 31.5 (L) 03/04/2022    MCV 93.8 03/04/2022     " 03/04/2022     Lab Results   Component Value Date    TSH 0.467 01/30/2022     Lab Results   Component Value Date    HGBA1C 4.70 (L) 02/21/2022        Procedures             Assessment:      Diagnosis Plan   1. Chest pain, unspecified type, resolved with negative myocardial perfusion study. Stable, no current chest pain, continue current management and heart healthy lifestyle.  No need for further cardiac work-up.   2. Essential hypertension   well-controlled, continue losartan HCTZ.     Plan:   Stable from cardiac standpoint, no current chest pain.  Myocardial perfusion PET scan was negative.  He is now status post thymoma surgery with good recovery and no ongoing symptoms.  Diet, exercise and weight loss recommended.  Continue current medications.   FU in 12 MO, sooner as needed.  Thank you for allowing us to participate in the care of your patient.     Scribed for Bob Martinez MD by Jennifer Enamorado. 5/6/2022 09:20 EDT      IBob MD, personally performed the services described in this documentation as scribed by the above named individual in my presence, and it is both accurate and complete.  5/6/2022  09:30 EDT        Please note that portions of this note may have been completed with a voice recognition program. Efforts were made to edit the dictations, but occasionally words are mistranscribed.

## 2022-05-06 ENCOUNTER — OFFICE VISIT (OUTPATIENT)
Dept: CARDIOLOGY | Facility: CLINIC | Age: 42
End: 2022-05-06

## 2022-05-06 VITALS
HEART RATE: 100 BPM | WEIGHT: 315 LBS | HEIGHT: 68 IN | DIASTOLIC BLOOD PRESSURE: 80 MMHG | OXYGEN SATURATION: 99 % | SYSTOLIC BLOOD PRESSURE: 136 MMHG | BODY MASS INDEX: 47.74 KG/M2

## 2022-05-06 DIAGNOSIS — R07.9 CHEST PAIN, UNSPECIFIED TYPE: Primary | ICD-10-CM

## 2022-05-06 DIAGNOSIS — I10 ESSENTIAL HYPERTENSION: ICD-10-CM

## 2022-05-06 PROCEDURE — 99213 OFFICE O/P EST LOW 20 MIN: CPT | Performed by: INTERNAL MEDICINE

## 2022-09-06 ENCOUNTER — HOSPITAL ENCOUNTER (OUTPATIENT)
Dept: CT IMAGING | Facility: HOSPITAL | Age: 42
Discharge: HOME | End: 2022-09-06
Admitting: INTERNAL MEDICINE

## 2022-09-06 DIAGNOSIS — D49.89 THYMOMA: ICD-10-CM

## 2022-09-06 LAB — CREAT BLDA-MCNC: 1 MG/DL (ref 0.6–1.3)

## 2022-09-06 PROCEDURE — 25010000002 IOPAMIDOL 61 % SOLUTION: Performed by: INTERNAL MEDICINE

## 2022-09-06 PROCEDURE — 82565 ASSAY OF CREATININE: CPT

## 2022-09-06 PROCEDURE — 71260 CT THORAX DX C+: CPT

## 2022-09-06 RX ADMIN — IOPAMIDOL 75 ML: 612 INJECTION, SOLUTION INTRAVENOUS at 13:47

## 2022-09-09 ENCOUNTER — PATIENT MESSAGE (OUTPATIENT)
Dept: FAMILY MEDICINE CLINIC | Facility: CLINIC | Age: 42
End: 2022-09-09

## 2022-09-09 DIAGNOSIS — I10 PRIMARY HYPERTENSION: ICD-10-CM

## 2022-09-09 RX ORDER — LOSARTAN POTASSIUM AND HYDROCHLOROTHIAZIDE 12.5; 5 MG/1; MG/1
1 TABLET ORAL DAILY
Qty: 30 TABLET | Refills: 0 | Status: SHIPPED | OUTPATIENT
Start: 2022-09-09 | End: 2023-01-10 | Stop reason: SDUPTHER

## 2022-09-13 ENCOUNTER — OFFICE VISIT (OUTPATIENT)
Dept: ONCOLOGY | Facility: CLINIC | Age: 42
End: 2022-09-13

## 2022-09-13 VITALS
HEART RATE: 81 BPM | OXYGEN SATURATION: 96 % | SYSTOLIC BLOOD PRESSURE: 134 MMHG | HEIGHT: 68 IN | RESPIRATION RATE: 18 BRPM | TEMPERATURE: 97.1 F | DIASTOLIC BLOOD PRESSURE: 72 MMHG | WEIGHT: 315 LBS | BODY MASS INDEX: 47.74 KG/M2

## 2022-09-13 DIAGNOSIS — C37 THYMIC CARCINOMA: Primary | ICD-10-CM

## 2022-09-13 PROCEDURE — 99214 OFFICE O/P EST MOD 30 MIN: CPT | Performed by: NURSE PRACTITIONER

## 2022-09-13 RX ORDER — HYDROCHLOROTHIAZIDE 12.5 MG/1
12.5 TABLET ORAL DAILY
COMMUNITY
Start: 2022-06-07 | End: 2023-01-10 | Stop reason: SDUPTHER

## 2022-09-13 RX ORDER — CENOBAMATE 12.5-25MG
KIT ORAL
COMMUNITY
Start: 2022-08-03 | End: 2023-02-09

## 2022-09-13 RX ORDER — CENOBAMATE 50MG-100MG
KIT ORAL
COMMUNITY
Start: 2022-08-29 | End: 2023-02-09

## 2022-09-13 NOTE — PROGRESS NOTES
DATE OF VISIT: 9/13/2022    REASON FOR VISIT: Followup for thymoma     PROBLEM LIST:  1.  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  Presented with shortness of breath and chest tightness  B.  CT chest done January 29 2022 revealed 8.8 cm mass right anterior mediastinum  C.  Diagnosed after CT-guided biopsy done February 1, 2022  D.  Status post thoracotomy with resection of mass done by Dr. Fu 2/23/2022  E.  Final pathology revealed 11.5 cm thymoma, encapsulated, clear surgical margins, 2 - lymph nodes.  2.  Morbid Obesity: BMI 52  3.  Heartburn    HISTORY OF PRESENT ILLNESS: The patient is a very pleasant 42 y.o. male  with past medical history significant for thymoma diagnosed January 2022.  He was treated with surgical resection.  The patient is here today for scheduled follow-up visit.    SUBJECTIVE: The patient is here today by himself.  He has been doing well since his last visit.  He is continue to recover from his surgery in February and has very little residual symptomatology.  He reports some intermittent incisional discomfort when he does heavy lifting or strenuous exercise, but denies any progressive shortness of breath or chest discomfort.  He has had no unexplained weight loss.  He is staying active.  He denies cough.  He has had COVID since his last visit that resolved without any significant symptoms.  He continues to follow-up with neurology regarding seizure management.    Past History:  Medical History: has a past medical history of Acromioclavicular separation (Roughly 20 years ago), ADHD (attention deficit hyperactivity disorder), Anxiety, Arthritis, Asthma (1/2019), Benign tumor of thymus, Depression, History of medical problems, Hypertension, Knee sprain (Over 20 years ago), Knee swelling (Early Dec.), Obesity, Seizures (HCC), Stroke (HCC), and Visual impairment.   Surgical History: has a past surgical history that includes Thymectomy (N/A, 2/23/2022).   Family History: family history includes  "Cancer in his father; Depression in his mother; Heart disease in his paternal grandfather; Hypertension in his father and mother; Kidney disease in his father and mother; Mental illness in his mother.   Social History: reports that he has never smoked. He has never used smokeless tobacco. He reports current alcohol use of about 4.0 standard drinks of alcohol per week. He reports that he does not use drugs.    (Not in a hospital admission)     Allergies: Patient has no known allergies.     Review of Systems   Cardiovascular: Positive for chest pain.        Incisional chest pain with strenuous activity.   Musculoskeletal: Positive for arthralgias.   All other systems reviewed and are negative.      PHYSICAL EXAMINATION:   /72   Pulse 81   Temp 97.1 °F (36.2 °C) (Temporal)   Resp 18   Ht 172.7 cm (67.99\")   Wt (!) 163 kg (359 lb)   SpO2 96%   BMI 54.60 kg/m²    Pain Score    09/13/22 1314   PainSc: 0-No pain      ECOG score: 0        ECOG Performance Status: 0 - Asymptomatic  General Appearance:  alert, cooperative, no apparent distress, appears stated age and obese   Lungs:   Clear to auscultation bilaterally; respirations regular, even, and unlabored bilaterally   Heart:  Regular rate and rhythm, no murmurs appreciated   Abdomen:   Soft, non-tender, non-distended and no organomegaly     Hospital Outpatient Visit on 09/06/2022   Component Date Value Ref Range Status   • Creatinine 09/06/2022 1.00  0.60 - 1.30 mg/dL Final    Serial Number: [id number]:  [id number]        CT Chest With Contrast Diagnostic    Result Date: 9/7/2022  Narrative: Examination: CT CHEST W CONTRAST DIAGNOSTIC Date of Exam: 9/6/2022 13:56 EDT Indication: f/u scans status post thymoma resection. Comparison: CT chest 1/29/2022. Technique: Contrast enhanced axial volumetric CT imaging of the chest was performed utilizing 75 mL  Isovue-300 IV contrast. Automated exposure control and iterative reconstruction methods were used. Findings: " There is no pneumothorax, pleural effusion or focal airspace consolidation. No significant pleural disease. No suspicious lung nodules.  Central and segmental airways appear patent. There is evidence of prior median sternotomy. Sternal wires appear intact. There is a residual low-density lesion within the anterior mediastinal fat measuring 2.2 x 2.2 cm in the axial plane on image 31. This abuts the dorsal aspect of the upper sternum, the ascending aorta and the upper margin of the main pulmonary artery. There are surgical clips in the anterior mediastinal from prior resection. The right lobe of the thyroid is enlarged and heterogeneous. The trachea and esophagus appear unremarkable.  Heart size is normal.  No significant pericardial effusion.  No evidence of mediastinal or hilar lymphadenopathy.  The great vessels, aorta and pulmonary artery appear within normal limits. Subcutaneous fat and underlying musculature appear within normal limits.  There are no acute osseous abnormalities or destructive bone lesions. There are multiple small low-density lesions in the liver including an 8 mm lesion in liver segment 2, 7 mm lesion in liver segment 5, 7 mm lesion in liver segment 7 and an 8 mm lesion in liver segment 7. These are likely small cysts or hemangiomas. There are mild lower thoracic degenerative changes.     Impression: 1. Interval median sternotomy and resection of anterior mediastinal mass. There is a 2.2 cm diameter area of residual low/intermediate density in the anterior mediastinum. It is not entirely clear if this represents postoperative change or residual/recurrent neoplasm and continued follow-up is recommended. 2. Several subcentimeter low-density lesions in the liver, likely cysts or small hemangiomas. Attention to the liver upon follow-up. Electronically Signed: Emmanuel Barnes MD 9/7/2022 6:34 EDT      ASSESSMENT: The patient is a very pleasant 42 y.o. male  with thymoma      PLAN:    1.   Thymoma:  A.  I reviewed the CAT scan results from 9/6/2022 with the patient.  I reviewed the images myself.  I reassured the patient he has had significant improvement postoperatively in the mediastinal mass, with only 2.2 cm area of residual disease that likely represents postoperative changes.  He had several benign appearing cystic lesions in the liver without evidence of focal mass or metastatic disease.    B.  We will continue watchful waiting with surveillance CT of chest ordered in 6 months.    C.  We will repeat labs on return including CBC and CMP.    D.  He will continue follow-up with CT surgery, Dr. Fu, for ongoing postoperative follow-up.    E.  I advise he call and return sooner if he has any symptomatology worrisome risk for relapse disease including increased chest discomfort, shortness of breath, cough, unexplained weight loss, and abdominal pain for earlier evaluation.     2.  Postoperative pleural effusions:  A.  I reassured the patient that his most recent scan did not show any evidence of relapsed effusions.    3.  Morbid obesity:  A.  BMI 52.  B.  I encouraged him to continue efforts towards weight loss and exercise.  He is aware that obesity is a risk factor for cancer.    4.  Seizure disorder:  A.  He will continue follow-up with neurology regarding management of seizure disorder.  B. He is currently on Xcorpi and Trileptal for management.     FOLLOW UP: 6 months CT chest.    Yanet Orr, APRN  9/13/2022

## 2023-01-10 ENCOUNTER — OFFICE VISIT (OUTPATIENT)
Dept: FAMILY MEDICINE CLINIC | Facility: CLINIC | Age: 43
End: 2023-01-10
Payer: COMMERCIAL

## 2023-01-10 VITALS
BODY MASS INDEX: 47.74 KG/M2 | HEIGHT: 68 IN | SYSTOLIC BLOOD PRESSURE: 130 MMHG | OXYGEN SATURATION: 98 % | WEIGHT: 315 LBS | HEART RATE: 89 BPM | DIASTOLIC BLOOD PRESSURE: 84 MMHG

## 2023-01-10 DIAGNOSIS — I10 PRIMARY HYPERTENSION: Primary | ICD-10-CM

## 2023-01-10 DIAGNOSIS — Z23 IMMUNIZATION DUE: ICD-10-CM

## 2023-01-10 DIAGNOSIS — K21.9 GASTROESOPHAGEAL REFLUX DISEASE, UNSPECIFIED WHETHER ESOPHAGITIS PRESENT: ICD-10-CM

## 2023-01-10 PROCEDURE — 90686 IIV4 VACC NO PRSV 0.5 ML IM: CPT | Performed by: INTERNAL MEDICINE

## 2023-01-10 PROCEDURE — 90471 IMMUNIZATION ADMIN: CPT | Performed by: INTERNAL MEDICINE

## 2023-01-10 PROCEDURE — 99214 OFFICE O/P EST MOD 30 MIN: CPT | Performed by: INTERNAL MEDICINE

## 2023-01-10 RX ORDER — LOSARTAN POTASSIUM AND HYDROCHLOROTHIAZIDE 12.5; 5 MG/1; MG/1
1 TABLET ORAL DAILY
Qty: 90 TABLET | Refills: 3 | Status: SHIPPED | OUTPATIENT
Start: 2023-01-10

## 2023-01-10 RX ORDER — OMEPRAZOLE 20 MG/1
20 CAPSULE, DELAYED RELEASE ORAL DAILY
Qty: 30 CAPSULE | Refills: 0 | Status: SHIPPED | OUTPATIENT
Start: 2023-01-10 | End: 2023-02-06

## 2023-01-10 NOTE — PROGRESS NOTES
Chief Complaint   Patient presents with   • Hypertension     Med refills; 170/105 and sometimes in the 200's for systolic. Most recent reading 2 days ago.    • Chest Pain     Week ago with high blood pressure. Has started OTC acid reflux med and it helped with the pains.        HPI:  Jose Hardin is a 42 y.o. male who presents today for f/u HTN.  He reports some chest pain that was relieved after antacid.    ROS:  Constitutional: no fevers, night sweats or unexplained weight loss  Eyes: no vision changes  ENT: no runny nose, ear pain, sore throat  Cardio: no chest pain, palpitations  Pulm: no shortness of breath, wheezing, or cough  GI: no abdominal pain or changes in bowel movements  : no difficulty urinating  MSK: no difficulty ambulating, no joint pain  Neuro: no weakness, dizziness or headache  Psych: no trouble sleeping  Endo: no change in appetite      Past Medical History:   Diagnosis Date   • Acromioclavicular separation Roughly 20 years ago    High school football injury   • ADHD (attention deficit hyperactivity disorder)    • Anxiety    • Arthritis    • Asthma 1/2019    cpap breathing machine   • Benign tumor of thymus    • Depression    • History of medical problems     Psuedotumor cerebri/Intracranial hypertension   • Hypertension    • Knee sprain Over 20 years ago    Old injury   • Knee swelling Early Dec.    Recent X-Ray for this visit   • Obesity    • Seizures (HCC)     having seizures once every two weeks x 2 years   • Stroke (HCC)     Mini-stroke/TIA   • Visual impairment       Family History   Problem Relation Age of Onset   • Hypertension Mother    • Mental illness Mother    • Kidney disease Mother    • Depression Mother    • Hypertension Father    • Kidney disease Father    • Cancer Father    • Heart disease Paternal Grandfather    • Stroke Paternal Grandfather       Social History     Socioeconomic History   • Marital status: Single   • Number of children: 0   Tobacco Use   •  Smoking status: Never   • Smokeless tobacco: Never   Vaping Use   • Vaping Use: Some days   • Substances: CBD   • Devices: Pre-filled or refillable cartridge   Substance and Sexual Activity   • Alcohol use: Yes     Alcohol/week: 4.0 standard drinks     Types: 2 Cans of beer, 2 Drinks containing 0.5 oz of alcohol per week   • Drug use: Never   • Sexual activity: Not Currently     Partners: Female     Birth control/protection: None      No Known Allergies   Immunization History   Administered Date(s) Administered   • COVID-19 (PFIZER) PURPLE CAP 03/25/2021, 04/16/2021   • FluLaval/Fluzone >6mos 01/05/2021, 01/31/2022, 01/10/2023   • PPD Test 10/26/2001   • Tdap 09/02/2020        PE:  Vitals:    01/10/23 1051   BP: 130/84   Pulse: 89   SpO2: 98%      Body mass index is 53.39 kg/m².    Gen Appearance: NAD  HEENT: Normocephalic, PERRLA, no thyromegaly, trache midline  Heart: RRR, normal S1 and S2, no murmur  Lungs: CTA b/l, no wheezing, no crackles  Abdomen: Soft, non-tender, non-distended, no guarding and BSx4  MSK: Moves all extremities well, normal gait, no peripheral edema  Pulses: Palpable and equal b/l  Lymph nodes: No palpable lymphadenopathy   Neuro: No focal deficits      Current Outpatient Medications   Medication Sig Dispense Refill   • losartan-hydrochlorothiazide (Hyzaar) 50-12.5 MG per tablet Take 1 tablet by mouth Daily. 90 tablet 3   • Xcopri 14 x 12.5 MG & 14 x 25 MG tablet therapy pack TAKE A 12.5 MG TABLET ONCE A DAY FOR 14 DAYS THEN TAKE A 25 MG TABLET FOR 14 DAYS     • Xcopri 14 x 50 MG & 14 x100 MG tablet therapy pack TAKE A 50 MG TABLET ONCE A DAY FOR 14 DAYS THEN TAKE A 100 MG TABLET ONCE A DAY FOR 14 DAYS     • omeprazole (priLOSEC) 20 MG capsule Take 1 capsule by mouth Daily. 30 capsule 0     No current facility-administered medications for this visit.      Counseling was given to patient for the following topics: instructions for management, risk factor reductions and importance of treatment  compliance . Total time of the encounter was 30 minutes and 15 minutes was spent face to face counseling.    Diagnoses and all orders for this visit:    1. Primary hypertension (Primary)  -     losartan-hydrochlorothiazide (Hyzaar) 50-12.5 MG per tablet; Take 1 tablet by mouth Daily.  Dispense: 90 tablet; Refill: 3  Stable BP, continue current blood pressure medication.  2. Gastroesophageal reflux disease, unspecified whether esophagitis present  -     omeprazole (priLOSEC) 20 MG capsule; Take 1 capsule by mouth Daily.  Dispense: 30 capsule; Refill: 0  Recommend PPI therapy for 1 month.  Follow-up at that time for annual exam.  Will need EGD if no improvement.  Recommend return to clinic if chest pain returns.  3. Immunization due    Other orders  -     FluLaval/Fluzone >6 mos ([id number])         Return in about 4 weeks (around 2/7/2023).     Dictated Utilizing Dragon Dictation    Please note that portions of this note were completed with a voice recognition program.    Part of this note may be an electronic transcription/translation of spoken language to printed text using the Dragon Dictation System.  Answers for HPI/ROS submitted by the patient on 1/5/2023  What is the primary reason for your visit?: Other  Please describe your symptoms.: Some chest pain and abnormally high blood pressure at times. Also, check-in to see about renewing Hydrochlorothiazide, which is due for renewal and can no longer be refilled at the Pharmacy without Doctor's approval.  Have you had these symptoms before?: Yes  How long have you been having these symptoms?: Greater than 2 weeks  Please list any medications you are currently taking for this condition.: Hydrochlorothiazide for high BP.  Please describe any probable cause for these symptoms. : Could be something as simple as acid reflux, I just started taking an OTC med for that. I've had open chest surgery before to remove a tumor, but previous check-ups have not shown any problems  post-op with the surgery, sternum, etc. Not sure about the abnormally high BP, I'm sure stress, obesity/weight, diet, all are likely contributing.

## 2023-02-06 DIAGNOSIS — K21.9 GASTROESOPHAGEAL REFLUX DISEASE, UNSPECIFIED WHETHER ESOPHAGITIS PRESENT: ICD-10-CM

## 2023-02-06 RX ORDER — OMEPRAZOLE 20 MG/1
CAPSULE, DELAYED RELEASE ORAL
Qty: 30 CAPSULE | Refills: 0 | Status: SHIPPED | OUTPATIENT
Start: 2023-02-06

## 2023-02-09 ENCOUNTER — OFFICE VISIT (OUTPATIENT)
Dept: FAMILY MEDICINE CLINIC | Facility: CLINIC | Age: 43
End: 2023-02-09
Payer: COMMERCIAL

## 2023-02-09 ENCOUNTER — LAB (OUTPATIENT)
Dept: LAB | Facility: HOSPITAL | Age: 43
End: 2023-02-09
Payer: COMMERCIAL

## 2023-02-09 VITALS
BODY MASS INDEX: 47.74 KG/M2 | WEIGHT: 315 LBS | HEART RATE: 93 BPM | HEIGHT: 68 IN | SYSTOLIC BLOOD PRESSURE: 128 MMHG | OXYGEN SATURATION: 98 % | DIASTOLIC BLOOD PRESSURE: 72 MMHG

## 2023-02-09 DIAGNOSIS — K21.9 GASTROESOPHAGEAL REFLUX DISEASE, UNSPECIFIED WHETHER ESOPHAGITIS PRESENT: Primary | ICD-10-CM

## 2023-02-09 DIAGNOSIS — E55.9 VITAMIN D DEFICIENCY: ICD-10-CM

## 2023-02-09 DIAGNOSIS — Z80.42 FAMILY HISTORY OF PROSTATE CANCER: ICD-10-CM

## 2023-02-09 DIAGNOSIS — Z00.00 PREVENTATIVE HEALTH CARE: ICD-10-CM

## 2023-02-09 DIAGNOSIS — I10 PRIMARY HYPERTENSION: ICD-10-CM

## 2023-02-09 DIAGNOSIS — Z12.5 ENCOUNTER FOR PROSTATE CANCER SCREENING: ICD-10-CM

## 2023-02-09 LAB
25(OH)D3 SERPL-MCNC: 36.9 NG/ML (ref 30–100)
ALBUMIN SERPL-MCNC: 4.9 G/DL (ref 3.5–5.2)
ALBUMIN/GLOB SERPL: 1.6 G/DL
ALP SERPL-CCNC: 55 U/L (ref 39–117)
ALT SERPL W P-5'-P-CCNC: 17 U/L (ref 1–41)
ANION GAP SERPL CALCULATED.3IONS-SCNC: 10 MMOL/L (ref 5–15)
AST SERPL-CCNC: 21 U/L (ref 1–40)
BASOPHILS # BLD AUTO: 0.02 10*3/MM3 (ref 0–0.2)
BASOPHILS NFR BLD AUTO: 0.8 % (ref 0–1.5)
BILIRUB SERPL-MCNC: 0.3 MG/DL (ref 0–1.2)
BILIRUB UR QL STRIP: NEGATIVE
BUN SERPL-MCNC: 9 MG/DL (ref 6–20)
BUN/CREAT SERPL: 9.8 (ref 7–25)
CALCIUM SPEC-SCNC: 9.6 MG/DL (ref 8.6–10.5)
CHLORIDE SERPL-SCNC: 100 MMOL/L (ref 98–107)
CHOLEST SERPL-MCNC: 171 MG/DL (ref 0–200)
CLARITY UR: CLEAR
CO2 SERPL-SCNC: 27 MMOL/L (ref 22–29)
COLOR UR: YELLOW
CREAT SERPL-MCNC: 0.92 MG/DL (ref 0.76–1.27)
DEPRECATED RDW RBC AUTO: 39 FL (ref 37–54)
EGFRCR SERPLBLD CKD-EPI 2021: 107.8 ML/MIN/1.73
EOSINOPHIL # BLD AUTO: 0.06 10*3/MM3 (ref 0–0.4)
EOSINOPHIL NFR BLD AUTO: 2.4 % (ref 0.3–6.2)
ERYTHROCYTE [DISTWIDTH] IN BLOOD BY AUTOMATED COUNT: 11.8 % (ref 12.3–15.4)
GLOBULIN UR ELPH-MCNC: 3.1 GM/DL
GLUCOSE SERPL-MCNC: 94 MG/DL (ref 65–99)
GLUCOSE UR STRIP-MCNC: NEGATIVE MG/DL
HBA1C MFR BLD: 4.9 % (ref 4.8–5.6)
HCT VFR BLD AUTO: 41 % (ref 37.5–51)
HDLC SERPL-MCNC: 70 MG/DL (ref 40–60)
HGB BLD-MCNC: 14.1 G/DL (ref 13–17.7)
HGB UR QL STRIP.AUTO: NEGATIVE
IMM GRANULOCYTES # BLD AUTO: 0 10*3/MM3 (ref 0–0.05)
IMM GRANULOCYTES NFR BLD AUTO: 0 % (ref 0–0.5)
KETONES UR QL STRIP: NEGATIVE
LDLC SERPL CALC-MCNC: 92 MG/DL (ref 0–100)
LDLC/HDLC SERPL: 1.33 {RATIO}
LEUKOCYTE ESTERASE UR QL STRIP.AUTO: NEGATIVE
LYMPHOCYTES # BLD AUTO: 1 10*3/MM3 (ref 0.7–3.1)
LYMPHOCYTES NFR BLD AUTO: 40.3 % (ref 19.6–45.3)
MCH RBC QN AUTO: 31.1 PG (ref 26.6–33)
MCHC RBC AUTO-ENTMCNC: 34.4 G/DL (ref 31.5–35.7)
MCV RBC AUTO: 90.5 FL (ref 79–97)
MONOCYTES # BLD AUTO: 0.36 10*3/MM3 (ref 0.1–0.9)
MONOCYTES NFR BLD AUTO: 14.5 % (ref 5–12)
NEUTROPHILS NFR BLD AUTO: 1.04 10*3/MM3 (ref 1.7–7)
NEUTROPHILS NFR BLD AUTO: 42 % (ref 42.7–76)
NITRITE UR QL STRIP: NEGATIVE
NRBC BLD AUTO-RTO: 0 /100 WBC (ref 0–0.2)
PH UR STRIP.AUTO: 6.5 [PH] (ref 5–8)
PLATELET # BLD AUTO: 258 10*3/MM3 (ref 140–450)
PMV BLD AUTO: 10.5 FL (ref 6–12)
POTASSIUM SERPL-SCNC: 3.7 MMOL/L (ref 3.5–5.2)
PROT SERPL-MCNC: 8 G/DL (ref 6–8.5)
PROT UR QL STRIP: NEGATIVE
PSA SERPL-MCNC: 0.48 NG/ML (ref 0–4)
RBC # BLD AUTO: 4.53 10*6/MM3 (ref 4.14–5.8)
SODIUM SERPL-SCNC: 137 MMOL/L (ref 136–145)
SP GR UR STRIP: 1.02 (ref 1–1.03)
T4 FREE SERPL-MCNC: 1.19 NG/DL (ref 0.93–1.7)
TRIGL SERPL-MCNC: 41 MG/DL (ref 0–150)
TSH SERPL DL<=0.05 MIU/L-ACNC: 1.9 UIU/ML (ref 0.27–4.2)
UROBILINOGEN UR QL STRIP: NORMAL
VLDLC SERPL-MCNC: 9 MG/DL (ref 5–40)
WBC NRBC COR # BLD: 2.48 10*3/MM3 (ref 3.4–10.8)

## 2023-02-09 PROCEDURE — 80061 LIPID PANEL: CPT

## 2023-02-09 PROCEDURE — G0103 PSA SCREENING: HCPCS

## 2023-02-09 PROCEDURE — 80053 COMPREHEN METABOLIC PANEL: CPT

## 2023-02-09 PROCEDURE — 85025 COMPLETE CBC W/AUTO DIFF WBC: CPT

## 2023-02-09 PROCEDURE — 83036 HEMOGLOBIN GLYCOSYLATED A1C: CPT

## 2023-02-09 PROCEDURE — 84443 ASSAY THYROID STIM HORMONE: CPT

## 2023-02-09 PROCEDURE — 99213 OFFICE O/P EST LOW 20 MIN: CPT | Performed by: INTERNAL MEDICINE

## 2023-02-09 PROCEDURE — 84439 ASSAY OF FREE THYROXINE: CPT

## 2023-02-09 PROCEDURE — 82306 VITAMIN D 25 HYDROXY: CPT

## 2023-02-09 PROCEDURE — 81003 URINALYSIS AUTO W/O SCOPE: CPT

## 2023-02-09 NOTE — PROGRESS NOTES
Chief Complaint   Patient presents with   • Hypertension     1 month f/u        HPI:  Jose Hardin is a 42 y.o. male who presents today for follow-up heartburn.  Symptoms improved on omeprazole.  Now using as needed only.    ROS:  Constitutional: no fevers, night sweats or unexplained weight loss  Eyes: no vision changes  ENT: no runny nose, ear pain, sore throat  Cardio: no chest pain, palpitations  Pulm: no shortness of breath, wheezing, or cough  GI: no abdominal pain or changes in bowel movements  : no difficulty urinating  MSK: no difficulty ambulating, no joint pain  Neuro: no weakness, dizziness or headache  Psych: no trouble sleeping  Endo: no change in appetite      Past Medical History:   Diagnosis Date   • Acromioclavicular separation Roughly 20 years ago    High school football injury   • ADHD (attention deficit hyperactivity disorder)    • Anxiety    • Arthritis    • Asthma 1/2019    cpap breathing machine   • Benign tumor of thymus    • Depression    • History of medical problems     Psuedotumor cerebri/Intracranial hypertension   • Hypertension    • Knee sprain Over 20 years ago    Old injury   • Knee swelling Early Dec.    Recent X-Ray for this visit   • Obesity    • Seizures (HCC)     having seizures once every two weeks x 2 years   • Stroke (HCC)     Mini-stroke/TIA   • Visual impairment       Family History   Problem Relation Age of Onset   • Hypertension Mother    • Mental illness Mother    • Kidney disease Mother    • Depression Mother    • Hypertension Father    • Kidney disease Father    • Cancer Father    • Heart disease Paternal Grandfather    • Stroke Paternal Grandfather       Social History     Socioeconomic History   • Marital status: Single   • Number of children: 0   Tobacco Use   • Smoking status: Never   • Smokeless tobacco: Never   Vaping Use   • Vaping Use: Some days   • Substances: CBD   • Devices: Pre-filled or refillable cartridge   Substance and Sexual  Activity   • Alcohol use: Yes     Alcohol/week: 4.0 standard drinks     Types: 2 Cans of beer, 2 Drinks containing 0.5 oz of alcohol per week   • Drug use: Never   • Sexual activity: Not Currently     Partners: Female     Birth control/protection: None      No Known Allergies   Immunization History   Administered Date(s) Administered   • COVID-19 (PFIZER) PURPLE CAP 03/25/2021, 04/16/2021   • FluLaval/Fluzone >6mos 01/05/2021, 01/31/2022, 01/10/2023   • PPD Test 10/26/2001   • Tdap 09/02/2020        PE:  Vitals:    02/09/23 1055   BP: 128/72   Pulse: 93   SpO2: 98%      Body mass index is 52.62 kg/m².    Gen Appearance: NAD  HEENT: Normocephalic, PERRLA, no thyromegaly, trache midline  Heart: RRR, normal S1 and S2, no murmur  Lungs: CTA b/l, no wheezing, no crackles  Abdomen: Soft, non-tender, non-distended, no guarding and BSx4  MSK: Moves all extremities well, normal gait, no peripheral edema  Pulses: Palpable and equal b/l  Lymph nodes: No palpable lymphadenopathy   Neuro: No focal deficits      Current Outpatient Medications   Medication Sig Dispense Refill   • losartan-hydrochlorothiazide (Hyzaar) 50-12.5 MG per tablet Take 1 tablet by mouth Daily. 90 tablet 3   • omeprazole (priLOSEC) 20 MG capsule TAKE 1 CAPSULE BY MOUTH EVERY DAY 30 capsule 0     No current facility-administered medications for this visit.        Diagnoses and all orders for this visit:    1. Gastroesophageal reflux disease, unspecified whether esophagitis present (Primary)  Continue as needed omeprazole.  If symptoms worsen would recommend checking EGD.  Follow-up 1 month for recheck on symptoms.  2. Primary hypertension  Well-controlled blood pressure, no change medication  3. Preventative health care  -     CBC & Differential; Future  -     Comprehensive Metabolic Panel; Future  -     Hemoglobin A1c; Future  -     Lipid Panel; Future  -     TSH+Free T4; Future  -     Urinalysis With Culture If Indicated - Urine, Clean Catch; Future    4.  Vitamin D deficiency  -     Vitamin D,25-Hydroxy; Future    5. Encounter for prostate cancer screening  -     PSA Screen; Future    6. Family history of prostate cancer         Return in about 4 weeks (around 3/9/2023) for Annual.     Dictated Utilizing Dragon Dictation    Please note that portions of this note were completed with a voice recognition program.    Part of this note may be an electronic transcription/translation of spoken language to printed text using the Dragon Dictation System.

## 2023-02-13 DIAGNOSIS — D70.9 NEUTROPENIA, UNSPECIFIED TYPE: Primary | ICD-10-CM

## 2023-02-14 ENCOUNTER — LAB (OUTPATIENT)
Dept: LAB | Facility: HOSPITAL | Age: 43
End: 2023-02-14
Payer: COMMERCIAL

## 2023-02-14 DIAGNOSIS — D70.9 NEUTROPENIA, UNSPECIFIED TYPE: ICD-10-CM

## 2023-02-14 LAB
BASOPHILS # BLD AUTO: 0.03 10*3/MM3 (ref 0–0.2)
BASOPHILS NFR BLD AUTO: 1 % (ref 0–1.5)
DEPRECATED RDW RBC AUTO: 41.4 FL (ref 37–54)
EOSINOPHIL # BLD AUTO: 0.07 10*3/MM3 (ref 0–0.4)
EOSINOPHIL NFR BLD AUTO: 2.3 % (ref 0.3–6.2)
ERYTHROCYTE [DISTWIDTH] IN BLOOD BY AUTOMATED COUNT: 12.2 % (ref 12.3–15.4)
HCT VFR BLD AUTO: 39.1 % (ref 37.5–51)
HGB BLD-MCNC: 12.9 G/DL (ref 13–17.7)
IMM GRANULOCYTES # BLD AUTO: 0 10*3/MM3 (ref 0–0.05)
IMM GRANULOCYTES NFR BLD AUTO: 0 % (ref 0–0.5)
LYMPHOCYTES # BLD AUTO: 1.16 10*3/MM3 (ref 0.7–3.1)
LYMPHOCYTES NFR BLD AUTO: 37.3 % (ref 19.6–45.3)
MCH RBC QN AUTO: 30.4 PG (ref 26.6–33)
MCHC RBC AUTO-ENTMCNC: 33 G/DL (ref 31.5–35.7)
MCV RBC AUTO: 92.2 FL (ref 79–97)
MONOCYTES # BLD AUTO: 0.42 10*3/MM3 (ref 0.1–0.9)
MONOCYTES NFR BLD AUTO: 13.5 % (ref 5–12)
NEUTROPHILS NFR BLD AUTO: 1.43 10*3/MM3 (ref 1.7–7)
NEUTROPHILS NFR BLD AUTO: 45.9 % (ref 42.7–76)
NRBC BLD AUTO-RTO: 0 /100 WBC (ref 0–0.2)
PLATELET # BLD AUTO: 250 10*3/MM3 (ref 140–450)
PMV BLD AUTO: 10.4 FL (ref 6–12)
RBC # BLD AUTO: 4.24 10*6/MM3 (ref 4.14–5.8)
WBC NRBC COR # BLD: 3.11 10*3/MM3 (ref 3.4–10.8)

## 2023-02-14 PROCEDURE — 36415 COLL VENOUS BLD VENIPUNCTURE: CPT

## 2023-02-14 PROCEDURE — 85025 COMPLETE CBC W/AUTO DIFF WBC: CPT

## 2023-02-15 LAB
LAB AP CASE REPORT: NORMAL
PATH REPORT.FINAL DX SPEC: NORMAL
PATH REPORT.GROSS SPEC: NORMAL
PATHOLOGY REVIEW: YES

## 2023-03-07 ENCOUNTER — LAB (OUTPATIENT)
Dept: LAB | Facility: HOSPITAL | Age: 43
End: 2023-03-07
Payer: COMMERCIAL

## 2023-03-07 ENCOUNTER — HOSPITAL ENCOUNTER (OUTPATIENT)
Dept: CT IMAGING | Facility: HOSPITAL | Age: 43
Discharge: HOME | End: 2023-03-07
Payer: COMMERCIAL

## 2023-03-07 DIAGNOSIS — C37 THYMIC CARCINOMA: ICD-10-CM

## 2023-03-07 LAB
ALBUMIN SERPL-MCNC: 4.4 G/DL (ref 3.5–5.2)
ALBUMIN/GLOB SERPL: 1.3 G/DL
ALP SERPL-CCNC: 53 U/L (ref 39–117)
ALT SERPL W P-5'-P-CCNC: 18 U/L (ref 1–41)
ANION GAP SERPL CALCULATED.3IONS-SCNC: 8 MMOL/L (ref 5–15)
AST SERPL-CCNC: 27 U/L (ref 1–40)
BASOPHILS # BLD AUTO: 0.05 10*3/MM3 (ref 0–0.2)
BASOPHILS NFR BLD AUTO: 1.6 % (ref 0–1.5)
BILIRUB SERPL-MCNC: 0.3 MG/DL (ref 0–1.2)
BUN SERPL-MCNC: 10 MG/DL (ref 6–20)
BUN/CREAT SERPL: 10.8 (ref 7–25)
CALCIUM SPEC-SCNC: 9 MG/DL (ref 8.6–10.5)
CHLORIDE SERPL-SCNC: 100 MMOL/L (ref 98–107)
CO2 SERPL-SCNC: 29 MMOL/L (ref 22–29)
CREAT SERPL-MCNC: 0.93 MG/DL (ref 0.76–1.27)
DEPRECATED RDW RBC AUTO: 42.7 FL (ref 37–54)
EGFRCR SERPLBLD CKD-EPI 2021: 106.5 ML/MIN/1.73
EOSINOPHIL # BLD AUTO: 0.09 10*3/MM3 (ref 0–0.4)
EOSINOPHIL NFR BLD AUTO: 2.9 % (ref 0.3–6.2)
ERYTHROCYTE [DISTWIDTH] IN BLOOD BY AUTOMATED COUNT: 12.3 % (ref 12.3–15.4)
GLOBULIN UR ELPH-MCNC: 3.3 GM/DL
GLUCOSE SERPL-MCNC: 88 MG/DL (ref 65–99)
HCT VFR BLD AUTO: 41.9 % (ref 37.5–51)
HGB BLD-MCNC: 13.5 G/DL (ref 13–17.7)
IMM GRANULOCYTES # BLD AUTO: 0 10*3/MM3 (ref 0–0.05)
IMM GRANULOCYTES NFR BLD AUTO: 0 % (ref 0–0.5)
LYMPHOCYTES # BLD AUTO: 1.18 10*3/MM3 (ref 0.7–3.1)
LYMPHOCYTES NFR BLD AUTO: 38.3 % (ref 19.6–45.3)
MCH RBC QN AUTO: 30.3 PG (ref 26.6–33)
MCHC RBC AUTO-ENTMCNC: 32.2 G/DL (ref 31.5–35.7)
MCV RBC AUTO: 94.2 FL (ref 79–97)
MONOCYTES # BLD AUTO: 0.46 10*3/MM3 (ref 0.1–0.9)
MONOCYTES NFR BLD AUTO: 14.9 % (ref 5–12)
NEUTROPHILS NFR BLD AUTO: 1.3 10*3/MM3 (ref 1.7–7)
NEUTROPHILS NFR BLD AUTO: 42.3 % (ref 42.7–76)
NRBC BLD AUTO-RTO: 0 /100 WBC (ref 0–0.2)
PLATELET # BLD AUTO: 250 10*3/MM3 (ref 140–450)
PMV BLD AUTO: 10 FL (ref 6–12)
POTASSIUM SERPL-SCNC: 4 MMOL/L (ref 3.5–5.2)
PROT SERPL-MCNC: 7.7 G/DL (ref 6–8.5)
RBC # BLD AUTO: 4.45 10*6/MM3 (ref 4.14–5.8)
SODIUM SERPL-SCNC: 137 MMOL/L (ref 136–145)
WBC NRBC COR # BLD: 3.08 10*3/MM3 (ref 3.4–10.8)

## 2023-03-07 PROCEDURE — 85025 COMPLETE CBC W/AUTO DIFF WBC: CPT

## 2023-03-07 PROCEDURE — 80053 COMPREHEN METABOLIC PANEL: CPT

## 2023-03-07 PROCEDURE — 71260 CT THORAX DX C+: CPT

## 2023-03-07 PROCEDURE — 25510000001 IOPAMIDOL 61 % SOLUTION: Performed by: NURSE PRACTITIONER

## 2023-03-07 PROCEDURE — 36415 COLL VENOUS BLD VENIPUNCTURE: CPT

## 2023-03-07 RX ADMIN — IOPAMIDOL 75 ML: 612 INJECTION, SOLUTION INTRAVENOUS at 13:34

## 2023-03-14 ENCOUNTER — OFFICE VISIT (OUTPATIENT)
Dept: ONCOLOGY | Facility: CLINIC | Age: 43
End: 2023-03-14
Payer: COMMERCIAL

## 2023-03-14 VITALS
BODY MASS INDEX: 47.74 KG/M2 | SYSTOLIC BLOOD PRESSURE: 140 MMHG | HEART RATE: 72 BPM | DIASTOLIC BLOOD PRESSURE: 92 MMHG | WEIGHT: 315 LBS | TEMPERATURE: 96.9 F | RESPIRATION RATE: 18 BRPM | HEIGHT: 68 IN | OXYGEN SATURATION: 98 %

## 2023-03-14 DIAGNOSIS — C37 THYMIC CARCINOMA: Primary | ICD-10-CM

## 2023-03-14 PROCEDURE — 99214 OFFICE O/P EST MOD 30 MIN: CPT | Performed by: INTERNAL MEDICINE

## 2023-03-14 RX ORDER — CENOBAMATE 200 MG/1
1 TABLET, FILM COATED ORAL
COMMUNITY
Start: 2023-02-24

## 2023-03-14 NOTE — PROGRESS NOTES
DATE OF VISIT: 3/14/2023    REASON FOR VISIT: Followup for thymoma     PROBLEM LIST:  1.  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  Presented with shortness of breath and chest tightness  B.  CT chest done January 29 2022 revealed 8.8 cm mass right anterior mediastinum  C.  Diagnosed after CT-guided biopsy done February 1, 2022  D.  Status post thoracotomy with resection of mass done by Dr. Fu 2/23/2022  E.  Final pathology revealed 11.5 cm thymoma, encapsulated, clear surgical margins, 2 - lymph nodes.  2.  Morbid Obesity: BMI 52  3.  Heartburn    HISTORY OF PRESENT ILLNESS: The patient is a very pleasant 42 y.o. male  with past medical history significant for thymoma diagnosed January 2022.  He was treated with surgical resection.  The patient is here today for scheduled follow-up visit.    SUBJECTIVE: Jose is here today by himself.  All in all he is doing fairly well.  He denies any fever chills or night sweats.    Past History:  Medical History: has a past medical history of Acromioclavicular separation (Roughly 20 years ago), ADHD (attention deficit hyperactivity disorder), Anxiety, Arthritis, Asthma (1/2019), Benign tumor of thymus, Depression, History of medical problems, Hypertension, Knee sprain (Over 20 years ago), Knee swelling (Early Dec.), Obesity, Seizures (HCC), Stroke (HCC), and Visual impairment.   Surgical History: has a past surgical history that includes Thymectomy (N/A, 2/23/2022).   Family History: family history includes Cancer in his father; Depression in his mother; Heart disease in his paternal grandfather; Hypertension in his father and mother; Kidney disease in his father and mother; Mental illness in his mother; Stroke in his paternal grandfather.   Social History: reports that he has never smoked. He has never used smokeless tobacco. He reports current alcohol use of about 4.0 standard drinks per week. He reports that he does not use drugs.    (Not in a hospital admission)     Allergies:  "Patient has no known allergies.     Review of Systems   Constitutional: Positive for fatigue.   Respiratory: Negative.    Cardiovascular: Negative.    Musculoskeletal: Positive for arthralgias.   Psychiatric/Behavioral: The patient is nervous/anxious.        PHYSICAL EXAMINATION:   /92 Comment: LUE  Pulse 72   Temp 96.9 °F (36.1 °C) (Infrared)   Resp 18   Ht 172.7 cm (68\")   Wt (!) 156 kg (345 lb)   SpO2 98% Comment: RA  BMI 52.46 kg/m²    Pain Score    03/14/23 1335   PainSc: 0-No pain      ECOG score: 0        ECOG Performance Status: 1 - Symptomatic but completely ambulatory  General Appearance:  alert, cooperative, no apparent distress, appears stated age and obese   Lungs:   Clear to auscultation bilaterally; respirations regular, even, and unlabored bilaterally   Heart:  Regular rate and rhythm, no murmurs appreciated   Abdomen:   Soft, non-tender, non-distended and no organomegaly     Lab on 03/07/2023   Component Date Value Ref Range Status   • Glucose 03/07/2023 88  65 - 99 mg/dL Final   • BUN 03/07/2023 10  6 - 20 mg/dL Final   • Creatinine 03/07/2023 0.93  0.76 - 1.27 mg/dL Final   • Sodium 03/07/2023 137  136 - 145 mmol/L Final   • Potassium 03/07/2023 4.0  3.5 - 5.2 mmol/L Final   • Chloride 03/07/2023 100  98 - 107 mmol/L Final   • CO2 03/07/2023 29.0  22.0 - 29.0 mmol/L Final   • Calcium 03/07/2023 9.0  8.6 - 10.5 mg/dL Final   • Total Protein 03/07/2023 7.7  6.0 - 8.5 g/dL Final   • Albumin 03/07/2023 4.4  3.5 - 5.2 g/dL Final   • ALT (SGPT) 03/07/2023 18  1 - 41 U/L Final   • AST (SGOT) 03/07/2023 27  1 - 40 U/L Final   • Alkaline Phosphatase 03/07/2023 53  39 - 117 U/L Final   • Total Bilirubin 03/07/2023 0.3  0.0 - 1.2 mg/dL Final   • Globulin 03/07/2023 3.3  gm/dL Final    Calculated Result   • A/G Ratio 03/07/2023 1.3  g/dL Final   • BUN/Creatinine Ratio 03/07/2023 10.8  7.0 - 25.0 Final   • Anion Gap 03/07/2023 8.0  5.0 - 15.0 mmol/L Final   • eGFR 03/07/2023 106.5  >60.0 " mL/min/1.73 Final   • WBC 03/07/2023 3.08 (L)  3.40 - 10.80 10*3/mm3 Final   • RBC 03/07/2023 4.45  4.14 - 5.80 10*6/mm3 Final   • Hemoglobin 03/07/2023 13.5  13.0 - 17.7 g/dL Final   • Hematocrit 03/07/2023 41.9  37.5 - 51.0 % Final   • MCV 03/07/2023 94.2  79.0 - 97.0 fL Final   • MCH 03/07/2023 30.3  26.6 - 33.0 pg Final   • MCHC 03/07/2023 32.2  31.5 - 35.7 g/dL Final   • RDW 03/07/2023 12.3  12.3 - 15.4 % Final   • RDW-SD 03/07/2023 42.7  37.0 - 54.0 fl Final   • MPV 03/07/2023 10.0  6.0 - 12.0 fL Final   • Platelets 03/07/2023 250  140 - 450 10*3/mm3 Final   • Neutrophil % 03/07/2023 42.3 (L)  42.7 - 76.0 % Final   • Lymphocyte % 03/07/2023 38.3  19.6 - 45.3 % Final   • Monocyte % 03/07/2023 14.9 (H)  5.0 - 12.0 % Final   • Eosinophil % 03/07/2023 2.9  0.3 - 6.2 % Final   • Basophil % 03/07/2023 1.6 (H)  0.0 - 1.5 % Final   • Immature Grans % 03/07/2023 0.0  0.0 - 0.5 % Final   • Neutrophils, Absolute 03/07/2023 1.30 (L)  1.70 - 7.00 10*3/mm3 Final   • Lymphocytes, Absolute 03/07/2023 1.18  0.70 - 3.10 10*3/mm3 Final   • Monocytes, Absolute 03/07/2023 0.46  0.10 - 0.90 10*3/mm3 Final   • Eosinophils, Absolute 03/07/2023 0.09  0.00 - 0.40 10*3/mm3 Final   • Basophils, Absolute 03/07/2023 0.05  0.00 - 0.20 10*3/mm3 Final   • Immature Grans, Absolute 03/07/2023 0.00  0.00 - 0.05 10*3/mm3 Final   • nRBC 03/07/2023 0.0  0.0 - 0.2 /100 WBC Final        CT Chest With Contrast Diagnostic    Result Date: 3/7/2023  Narrative: CT CHEST W CONTRAST DIAGNOSTIC Date of Exam: 3/7/2023 1:21 PM EST Indication: restaging thymic carcinoma. Comparison: 9/6/2022 Technique: Axial CT images were obtained of the chest after the uneventful intravenous administration of 75 mL Isovue-300.  Reconstructed coronal and sagittal images were also obtained. Automated exposure control and iterative construction methods were used. Findings: Previous exam report from 9/6/2022 showed interval median sternotomy and resection of anterior mediastinal  mass. Residual 2.2 cm diameter low-density area, potentially postoperative change. Today's study shows this low-density area to be less prominent, water density, which may represent a small postop seroma. No evidence of recurrent mass, new mass or adenopathy or pericardial effusion is seen. There is no pleural effusion. Lungs appear clear of active disease. Airway appears normally patent. Heterogeneous enlargement of the right thyroid lobe appears unchanged, and may represent goiter or perhaps stable heterogeneous thyroid nodule. Included images the upper abdomen show fatty liver change and several small hepatic lesions, consistent with a simple hepatic cyst. No new liver lesions are identified. Gallbladder, spleen, pancreatic tail, adrenal glands, and upper renal poles appear unremarkable. No upper abdominal adenopathy is seen. Bony structures appear to be intact.     Impression: Impression: 1. Mild residual postoperative changes of the superior mediastinum. No evidence of recurrent mass adenopathy or other significant new chest disease. 2. Heterogeneous enlargement of the right thyroid lobe, stable from prior exam potentially goiter. If patient has not had previous evaluation with thyroid ultrasound, this might be considered, given the improved. 3. Fatty liver change and stable small hepatic cysts. Electronically Signed: Norberto Martinez  3/7/2023 5:11 PM EST  Workstation ID: [id number]      ASSESSMENT: The patient is a very pleasant 42 y.o. male  with thymoma      PLAN:    1.  Thymoma:  A.  I did go over the scan results with the patient from March 7, 2023 and reassured him no evidence of relapsed thymoma.  B.  I did go over the blood results with the patient from March 7, 2023.  His leukopenia stable white blood cells 3.1 with normal hemoglobin and platelets.  CMP is normal.    2.  Thyroid goiter:  A.  Thyroid function was normal a year ago.  B.  I will repeat his thyroid function on return.    3.  Morbid obesity:  A.   BMI 52.  B.  I encouraged him to continue efforts towards weight loss and exercise.  He is aware that obesity is a risk factor for cancer.    4.  Seizure disorder:  A.  He will continue follow-up with neurology regarding management of seizure disorder.  B. He is currently on Xcorpi and Trileptal for management.     FOLLOW UP: 6 months CT chest.    Marian Wiggins MD  3/14/2023

## 2023-03-20 NOTE — PROGRESS NOTES
[facility] Cardiothoracic Surgery Office Follow Up Note     Date of Encounter: 2023     Name: Jose Hardin  : 1980     Referred By: No ref. provider found  PCP: Giovanny Coates DO    Chief Complaint:    Chief Complaint   Patient presents with   • thymic carcinoma     Follow-up with CT chest        Subjective      History of Present Illness:    Jose Hardin is a very pleasant 42 y.o. male with EtOH and vape use and history of morbid obesity, ADHD, seizures and thymic neoplasm incidentally found after ED visit for chest and back pain diagnosed via mediastinal needle biopsy who is now s/p thymectomy via sternotomy on 2022 by Dr. Fu.  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 He was seen postoperatively by Dr Wiggins 3/8/2022 with no adjuvant treatment required for stage I disease. He presents today with surveillance imaging doing very well. He has no sternal complaints and no constitutional symptoms.      Review of Systems:  Review of Systems   Constitutional: Negative for chills, decreased appetite, diaphoresis, fever, malaise/fatigue, night sweats, weight gain and weight loss.   HENT: Negative for hoarse voice.    Eyes: Negative for blurred vision, double vision and visual disturbance.   Cardiovascular: Negative for chest pain, claudication, dyspnea on exertion, irregular heartbeat, leg swelling, near-syncope, orthopnea, palpitations, paroxysmal nocturnal dyspnea and syncope.   Respiratory: Negative for cough, hemoptysis, shortness of breath, sputum production and wheezing.    Hematologic/Lymphatic: Negative for adenopathy and bleeding problem. Does not bruise/bleed easily.   Skin: Negative for color change, nail changes, poor wound healing and rash.   Musculoskeletal: Negative for back pain, falls and muscle cramps.   Gastrointestinal: Positive for heartburn (reflux). Negative for abdominal pain and  dysphagia.   Genitourinary: Negative for flank pain.   Neurological: Positive for light-headedness (after taking nightly meds). Negative for brief paralysis, disturbances in coordination, dizziness, focal weakness, headaches, loss of balance, numbness, paresthesias, sensory change, vertigo and weakness.   Psychiatric/Behavioral: Negative for depression and suicidal ideas. The patient is not nervous/anxious.    Allergic/Immunologic: Negative for persistent infections.       I have reviewed the following portions of the patient's history: allergies, current medications, past family history, past medical history, past social history, past surgical history and problem list and confirm it's accurate.    Allergies:  No Known Allergies    Medications:      Current Outpatient Medications:   •  Cenobamate (Xcopri) 200 MG tablet, Take 1 tablet by mouth every night at bedtime., Disp: , Rfl:   •  losartan-hydrochlorothiazide (Hyzaar) 50-12.5 MG per tablet, Take 1 tablet by mouth Daily., Disp: 90 tablet, Rfl: 3  •  omeprazole (priLOSEC) 20 MG capsule, TAKE 1 CAPSULE BY MOUTH EVERY DAY (Patient taking differently: Daily As Needed.), Disp: 30 capsule, Rfl: 0    History:   Past Medical History:   Diagnosis Date   • Acromioclavicular separation Roughly 20 years ago    High school football injury   • ADHD (attention deficit hyperactivity disorder)    • Anxiety    • Arthritis    • Asthma 1/2019    cpap breathing machine   • Benign tumor of thymus    • Depression    • History of medical problems     Psuedotumor cerebri/Intracranial hypertension   • Hypertension    • Knee sprain Over 20 years ago    Old injury   • Knee swelling Early Dec.    Recent X-Ray for this visit   • Obesity    • Seizures (HCC)     having seizures once every two weeks x 2 years   • Stroke (HCC)     Mini-stroke/TIA   • Visual impairment        Past Surgical History:   Procedure Laterality Date   • THYMECTOMY N/A 2/23/2022    Procedure: STERNOTOMY WITH THYMECTOMY;   Surgeon: Jose Fu MD;  Location: [facility];  Service: Cardiothoracic;  Laterality: N/A;       Social History     Socioeconomic History   • Marital status: Single   • Number of children: 0   Tobacco Use   • Smoking status: Never   • Smokeless tobacco: Never   Vaping Use   • Vaping Use: Some days   • Substances: CBD   • Devices: Pre-filled or refillable cartridge   Substance and Sexual Activity   • Alcohol use: Yes     Alcohol/week: 4.0 standard drinks     Types: 2 Cans of beer, 2 Drinks containing 0.5 oz of alcohol per week   • Drug use: Never   • Sexual activity: Not Currently     Partners: Female     Birth control/protection: None        Family History   Problem Relation Age of Onset   • Hypertension Mother    • Mental illness Mother    • Kidney disease Mother    • Depression Mother    • Hypertension Father    • Kidney disease Father    • Cancer Father    • Heart disease Paternal Grandfather    • Stroke Paternal Grandfather        Objective     Physical Exam:  Vitals:    03/21/23 1238   BP: 128/90   Pulse: 73   Temp: 97.1 °F (36.2 °C)   SpO2: 98%   Weight: (!) 157 kg (345 lb 9.6 oz)      Body mass index is 52.55 kg/m².    Physical Exam  Vitals and nursing note reviewed.   Constitutional:       Appearance: Normal appearance. He is obese.   Cardiovascular:      Rate and Rhythm: Normal rate.   Pulmonary:      Effort: Pulmonary effort is normal.   Skin:     General: Skin is warm and dry.   Neurological:      Mental Status: He is alert and oriented to person, place, and time. Mental status is at baseline.         Imaging/Labs:  CT Chest With Contrast Diagnostic-Result Date: 3/7/2023  Impression: 1. Mild residual postoperative changes of the superior mediastinum. No evidence of recurrent mass adenopathy or other significant new chest disease. 2. Heterogeneous enlargement of the right thyroid lobe, stable from prior exam potentially goiter. If patient has not had previous evaluation with thyroid ultrasound, this  might be considered, given the improved. 3. Fatty liver change and stable small hepatic cysts. Electronically Signed: Norberto Gonzalezd  3/7/2023 5:11 PM EST  Workstation ID: [id number]    CT Chest With Contrast Diagnostic (09/06/2022 18:19)  1. Interval median sternotomy and resection of anterior mediastinal mass. There is a 2.2 cm diameter area of residual low/intermediate density in the anterior mediastinum. It is not entirely clear if this represents postoperative change or   residual/recurrent neoplasm and continued follow-up is recommended.  2. Several subcentimeter low-density lesions in the liver, likely cysts or small hemangiomas. Attention to the liver upon follow-up.   Electronically Signed: Emmanuel Barnes MD 9/7/2022 6:34 EDT     CTA Chest-1/29/2022  No evidence of pulmonary embolism.  7.1 x 8.7 x 8.8 cm homogeneous lobulated circumscribed soft tissue mass of the anterior mediastinum. No evidence of pleural/chest wall or pericardial invasion. This appears separate from the thyroid gland. The appearance is nonspecific but differential considerations include lymphoma and thymoma or less likely teratoma. No definite evidence of separate mediastinal or hilar adenopathy or separate discrete pleural soft tissue lesions. This lesion will likely need biopsy and recommend referral to appropriate service. Further evaluation with PET/CT scan may be of value.     XR Chest 1 View-1/29/2022  Approximately 10 cm right midlung mass and elevation of the right hemidiaphragm. CT scan evaluation is suggested.  Assessment / Plan      Assessment / Plan:  Diagnoses and all orders for this visit:    1. Thymic carcinoma (HCC) (Primary)       • Thymic neoplasm incidentally found after ED visit for chest and back pain diagnosed via mediastinal needle biopsy who is now s/p thymectomy via sternotomy on 2/23/2022 by Dr. Fu.    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    • Being followed by  oncology Dr Wiggins: visit 3/8/2022 with no adjuvant treatment required for his stage I disease and plans for 6-month CT chest surveillance  • Surveillance appointment asymptomatic with no constitutional symptoms or sternotomy complications  • CT imaging with no recurrent mediastinal mass or adenopathy concerning for disease recurrence  • At this point will defer to oncology for ongoing surveillance.  Patient is anticipating next scan with Dr Wiggins 9/2023  • It has been a pleasure taking care of of Mr. Garcia and we are available for any further consultation or interventional needs    Follow Up:   Return on as needed basis.   Or sooner for any further concerns or worsening sign and symptoms. If unable to reach us in the office please dial 911 or go to the nearest emergency department.      NICHELLE Butcher  [facility] Cardiothoracic Surgery    Time Spent: I spent 23 minutes caring for Jose on this date of service. This time includes time spent by me in the following activities: preparing for the visit, reviewing tests, obtaining and/or reviewing a separately obtained history, performing a medically appropriate examination and/or evaluation, counseling and educating the patient/family/caregiver, ordering medications, tests, or procedures, documenting information in the medical record, independently interpreting results and communicating that information with the patient/family/caregiver and care coordination.

## 2023-03-21 ENCOUNTER — OFFICE VISIT (OUTPATIENT)
Dept: CARDIAC SURGERY | Facility: CLINIC | Age: 43
End: 2023-03-21
Payer: COMMERCIAL

## 2023-03-21 VITALS
SYSTOLIC BLOOD PRESSURE: 128 MMHG | WEIGHT: 315 LBS | OXYGEN SATURATION: 98 % | TEMPERATURE: 97.1 F | DIASTOLIC BLOOD PRESSURE: 90 MMHG | HEART RATE: 73 BPM | BODY MASS INDEX: 52.55 KG/M2

## 2023-03-21 DIAGNOSIS — C37 THYMIC CARCINOMA: Primary | ICD-10-CM

## 2023-03-21 PROBLEM — E87.1 HYPONATREMIA: Status: RESOLVED | Noted: 2022-02-25 | Resolved: 2023-03-21

## 2023-03-21 PROBLEM — R79.89 ELEVATED SERUM CREATININE: Status: RESOLVED | Noted: 2022-02-25 | Resolved: 2023-03-21

## 2023-03-21 PROBLEM — G47.33 OBSTRUCTIVE SLEEP APNEA SYNDROME IN ADULT: Status: ACTIVE | Noted: 2020-09-22

## 2023-03-21 PROBLEM — J98.59 MEDIASTINAL MASS: Status: RESOLVED | Noted: 2022-01-29 | Resolved: 2023-03-21

## 2023-03-21 PROCEDURE — 99213 OFFICE O/P EST LOW 20 MIN: CPT | Performed by: NURSE PRACTITIONER

## 2023-04-11 ENCOUNTER — OFFICE VISIT (OUTPATIENT)
Dept: FAMILY MEDICINE CLINIC | Facility: CLINIC | Age: 43
End: 2023-04-11
Payer: COMMERCIAL

## 2023-04-11 VITALS
SYSTOLIC BLOOD PRESSURE: 130 MMHG | HEIGHT: 68 IN | HEART RATE: 97 BPM | OXYGEN SATURATION: 98 % | DIASTOLIC BLOOD PRESSURE: 84 MMHG | WEIGHT: 315 LBS | BODY MASS INDEX: 47.74 KG/M2

## 2023-04-11 DIAGNOSIS — Z12.5 ENCOUNTER FOR PROSTATE CANCER SCREENING: ICD-10-CM

## 2023-04-11 DIAGNOSIS — I10 PRIMARY HYPERTENSION: ICD-10-CM

## 2023-04-11 DIAGNOSIS — D70.9 NEUTROPENIA, UNSPECIFIED TYPE: ICD-10-CM

## 2023-04-11 DIAGNOSIS — D49.89 THYMOMA: ICD-10-CM

## 2023-04-11 DIAGNOSIS — Z00.00 PREVENTATIVE HEALTH CARE: Primary | ICD-10-CM

## 2023-04-11 DIAGNOSIS — K21.9 GASTROESOPHAGEAL REFLUX DISEASE, UNSPECIFIED WHETHER ESOPHAGITIS PRESENT: ICD-10-CM

## 2023-04-11 DIAGNOSIS — G40.909 SEIZURE DISORDER: ICD-10-CM

## 2023-04-11 DIAGNOSIS — G47.33 OBSTRUCTIVE SLEEP APNEA SYNDROME IN ADULT: ICD-10-CM

## 2023-04-11 PROCEDURE — 99396 PREV VISIT EST AGE 40-64: CPT | Performed by: INTERNAL MEDICINE

## 2023-04-11 NOTE — PROGRESS NOTES
Chief Complaint   Patient presents with   • Annual Exam       HPI:  Jose Hardin is a 42 y.o. male who presents today for annual exam.    ROS:  Constitutional: no fevers, night sweats or unexplained weight loss  Eyes: no vision changes  ENT: no runny nose, ear pain, sore throat  Cardio: no chest pain, palpitations  Pulm: no shortness of breath, wheezing, or cough  GI: no abdominal pain or changes in bowel movements  : no difficulty urinating  MSK: no difficulty ambulating, no joint pain  Neuro: no weakness, dizziness or headache  Psych: no trouble sleeping  Endo: no change in appetite      Past Medical History:   Diagnosis Date   • Acromioclavicular separation Roughly 20 years ago    High school football injury   • ADHD (attention deficit hyperactivity disorder)    • Anemia     Slight red blood count   • Anxiety    • Arthritis    • Asthma 1/2019    cpap breathing machine   • Benign tumor of thymus    • Depression    • GERD (gastroesophageal reflux disease)     Acid reflux   • History of medical problems     Psuedotumor cerebri/Intracranial hypertension   • Hypertension    • Hypothyroidism     Slightly swollen thyroid, goiter   • Knee sprain Over 20 years ago    Old injury   • Knee swelling Early Dec.    Recent X-Ray for this visit   • Obesity    • Seizures     having seizures once every two weeks x 2 years   • Stroke     Mini-stroke/TIA   • Visual impairment       Family History   Problem Relation Age of Onset   • Hypertension Mother    • Mental illness Mother    • Kidney disease Mother    • Depression Mother    • Hypertension Father    • Kidney disease Father    • Cancer Father    • Heart disease Paternal Grandfather    • Stroke Paternal Grandfather       Social History     Socioeconomic History   • Marital status: Single   • Number of children: 0   Tobacco Use   • Smoking status: Never   • Smokeless tobacco: Never   • Tobacco comments:     No history or use.   Vaping Use   • Vaping Use: Some days    • Substances: CBD   • Devices: Pre-filled or refillable cartridge   Substance and Sexual Activity   • Alcohol use: Yes     Alcohol/week: 4.0 standard drinks     Types: 2 Cans of beer, 2 Drinks containing 0.5 oz of alcohol per week   • Drug use: Never   • Sexual activity: Not Currently     Partners: Female     Birth control/protection: None      No Known Allergies   Immunization History   Administered Date(s) Administered   • COVID-19 (PFIZER) PURPLE CAP 03/25/2021, 04/16/2021   • FluLaval/Fluzone >6mos 01/05/2021, 01/31/2022, 01/10/2023   • Influenza, Unspecified 01/05/2021, 01/31/2022   • PPD Test 10/26/2001   • Tdap 09/02/2020        PE:  Vitals:    04/11/23 0959   BP: 130/84   Pulse: 97   SpO2: 98%      Body mass index is 52.61 kg/m².    Gen Appearance: NAD  HEENT: Normocephalic, PERRLA, no thyromegaly, trache midline  Heart: RRR, normal S1 and S2, no murmur  Lungs: CTA b/l, no wheezing, no crackles  Abdomen: Soft, non-tender, non-distended, no guarding and BSx4  MSK: Moves all extremities well, normal gait, no peripheral edema  Pulses: Palpable and equal b/l  Lymph nodes: No palpable lymphadenopathy   Neuro: No focal deficits      Current Outpatient Medications   Medication Sig Dispense Refill   • Cenobamate (Xcopri) 200 MG tablet Take 1 tablet by mouth every night at bedtime.     • losartan-hydrochlorothiazide (Hyzaar) 50-12.5 MG per tablet Take 1 tablet by mouth Daily. 90 tablet 3   • omeprazole (priLOSEC) 20 MG capsule TAKE 1 CAPSULE BY MOUTH EVERY DAY (Patient taking differently: Daily As Needed.) 30 capsule 0     No current facility-administered medications for this visit.        Diagnoses and all orders for this visit:    1. Preventative health care (Primary)  Counseled on healthy weight, nutrition, physical activity, cancer screening, and immunizations.    2. Encounter for prostate cancer screening    3. Primary hypertension    4. Gastroesophageal reflux disease, unspecified whether esophagitis  present    5. Obstructive sleep apnea syndrome in adult    6. Thymoma    7. Neutropenia, unspecified type    8. Seizure disorder         No follow-ups on file.     Dictated Utilizing Dragon Dictation    Please note that portions of this note were completed with a voice recognition program.    Part of this note may be an electronic transcription/translation of spoken language to printed text using the Dragon Dictation System.

## 2023-08-21 DIAGNOSIS — E66.01 CLASS 3 SEVERE OBESITY DUE TO EXCESS CALORIES WITHOUT SERIOUS COMORBIDITY WITH BODY MASS INDEX (BMI) OF 50.0 TO 59.9 IN ADULT: Primary | ICD-10-CM

## 2023-08-21 RX ORDER — SEMAGLUTIDE 0.25 MG/.5ML
0.25 INJECTION, SOLUTION SUBCUTANEOUS WEEKLY
Qty: 3 ML | Refills: 0 | Status: SHIPPED | OUTPATIENT
Start: 2023-08-21

## 2023-09-05 ENCOUNTER — LAB (OUTPATIENT)
Dept: LAB | Facility: HOSPITAL | Age: 43
End: 2023-09-05
Payer: COMMERCIAL

## 2023-09-05 ENCOUNTER — HOSPITAL ENCOUNTER (OUTPATIENT)
Dept: CT IMAGING | Facility: HOSPITAL | Age: 43
Discharge: HOME | End: 2023-09-05
Payer: COMMERCIAL

## 2023-09-05 DIAGNOSIS — C37 THYMIC CARCINOMA: ICD-10-CM

## 2023-09-05 LAB
ALBUMIN SERPL-MCNC: 4.3 G/DL (ref 3.5–5.2)
ALBUMIN/GLOB SERPL: 1.3 G/DL
ALP SERPL-CCNC: 52 U/L (ref 39–117)
ALT SERPL W P-5'-P-CCNC: 17 U/L (ref 1–41)
ANION GAP SERPL CALCULATED.3IONS-SCNC: 11 MMOL/L (ref 5–15)
AST SERPL-CCNC: 24 U/L (ref 1–40)
BASOPHILS # BLD AUTO: 0.02 10*3/MM3 (ref 0–0.2)
BASOPHILS NFR BLD AUTO: 0.7 % (ref 0–1.5)
BILIRUB SERPL-MCNC: 0.3 MG/DL (ref 0–1.2)
BUN SERPL-MCNC: 16 MG/DL (ref 6–20)
BUN/CREAT SERPL: 15.8 (ref 7–25)
CALCIUM SPEC-SCNC: 9.3 MG/DL (ref 8.6–10.5)
CHLORIDE SERPL-SCNC: 101 MMOL/L (ref 98–107)
CO2 SERPL-SCNC: 26 MMOL/L (ref 22–29)
CREAT BLDA-MCNC: 1.1 MG/DL (ref 0.6–1.3)
CREAT SERPL-MCNC: 1.01 MG/DL (ref 0.76–1.27)
DEPRECATED RDW RBC AUTO: 44.4 FL (ref 37–54)
EGFRCR SERPLBLD CKD-EPI 2021: 95.8 ML/MIN/1.73
EOSINOPHIL # BLD AUTO: 0.03 10*3/MM3 (ref 0–0.4)
EOSINOPHIL NFR BLD AUTO: 1 % (ref 0.3–6.2)
ERYTHROCYTE [DISTWIDTH] IN BLOOD BY AUTOMATED COUNT: 12.9 % (ref 12.3–15.4)
GLOBULIN UR ELPH-MCNC: 3.2 GM/DL
GLUCOSE SERPL-MCNC: 85 MG/DL (ref 65–99)
HCT VFR BLD AUTO: 40.1 % (ref 37.5–51)
HGB BLD-MCNC: 13 G/DL (ref 13–17.7)
IMM GRANULOCYTES # BLD AUTO: 0.01 10*3/MM3 (ref 0–0.05)
IMM GRANULOCYTES NFR BLD AUTO: 0.3 % (ref 0–0.5)
LYMPHOCYTES # BLD AUTO: 1.05 10*3/MM3 (ref 0.7–3.1)
LYMPHOCYTES NFR BLD AUTO: 36.1 % (ref 19.6–45.3)
MCH RBC QN AUTO: 30.4 PG (ref 26.6–33)
MCHC RBC AUTO-ENTMCNC: 32.4 G/DL (ref 31.5–35.7)
MCV RBC AUTO: 93.9 FL (ref 79–97)
MONOCYTES # BLD AUTO: 0.41 10*3/MM3 (ref 0.1–0.9)
MONOCYTES NFR BLD AUTO: 14.1 % (ref 5–12)
NEUTROPHILS NFR BLD AUTO: 1.39 10*3/MM3 (ref 1.7–7)
NEUTROPHILS NFR BLD AUTO: 47.8 % (ref 42.7–76)
NRBC BLD AUTO-RTO: 0 /100 WBC (ref 0–0.2)
PLATELET # BLD AUTO: 270 10*3/MM3 (ref 140–450)
PMV BLD AUTO: 10 FL (ref 6–12)
POTASSIUM SERPL-SCNC: 4.2 MMOL/L (ref 3.5–5.2)
PROT SERPL-MCNC: 7.5 G/DL (ref 6–8.5)
RBC # BLD AUTO: 4.27 10*6/MM3 (ref 4.14–5.8)
SODIUM SERPL-SCNC: 138 MMOL/L (ref 136–145)
TSH SERPL DL<=0.05 MIU/L-ACNC: 1.37 UIU/ML (ref 0.27–4.2)
WBC NRBC COR # BLD: 2.91 10*3/MM3 (ref 3.4–10.8)

## 2023-09-05 PROCEDURE — 85025 COMPLETE CBC W/AUTO DIFF WBC: CPT

## 2023-09-05 PROCEDURE — 36415 COLL VENOUS BLD VENIPUNCTURE: CPT

## 2023-09-05 PROCEDURE — 25510000001 IOPAMIDOL 61 % SOLUTION: Performed by: INTERNAL MEDICINE

## 2023-09-05 PROCEDURE — 71260 CT THORAX DX C+: CPT

## 2023-09-05 PROCEDURE — 80053 COMPREHEN METABOLIC PANEL: CPT

## 2023-09-05 PROCEDURE — 84443 ASSAY THYROID STIM HORMONE: CPT

## 2023-09-05 PROCEDURE — 82565 ASSAY OF CREATININE: CPT

## 2023-09-05 RX ADMIN — IOPAMIDOL 75 ML: 612 INJECTION, SOLUTION INTRAVENOUS at 13:43

## 2023-09-12 ENCOUNTER — OFFICE VISIT (OUTPATIENT)
Dept: ONCOLOGY | Facility: CLINIC | Age: 43
End: 2023-09-12
Payer: COMMERCIAL

## 2023-09-12 VITALS
HEIGHT: 68 IN | DIASTOLIC BLOOD PRESSURE: 80 MMHG | TEMPERATURE: 96.9 F | OXYGEN SATURATION: 96 % | BODY MASS INDEX: 47.74 KG/M2 | HEART RATE: 63 BPM | WEIGHT: 315 LBS | SYSTOLIC BLOOD PRESSURE: 132 MMHG | RESPIRATION RATE: 18 BRPM

## 2023-09-12 DIAGNOSIS — C37 THYMIC CARCINOMA: Primary | ICD-10-CM

## 2023-09-12 PROCEDURE — 99214 OFFICE O/P EST MOD 30 MIN: CPT | Performed by: NURSE PRACTITIONER

## 2023-09-12 NOTE — PROGRESS NOTES
DATE OF VISIT: 9/12/2023    REASON FOR VISIT: Followup for thymoma     PROBLEM LIST:  1.  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  Presented with shortness of breath and chest tightness  B.  CT chest done January 29 2022 revealed 8.8 cm mass right anterior mediastinum  C.  Diagnosed after CT-guided biopsy done February 1, 2022  D.  Status post thoracotomy with resection of mass done by Dr. Fu 2/23/2022  E.  Final pathology revealed 11.5 cm thymoma, encapsulated, clear surgical margins, 2 - lymph nodes.  2.  Morbid Obesity: BMI 52  3.  Heartburn    HISTORY OF PRESENT ILLNESS: The patient is a very pleasant 43 y.o. male  with past medical history significant for thymoma diagnosed January 2022.  He was treated with surgical resection.  The patient is here today for scheduled follow-up visit.    SUBJECTIVE: The patient is here today by himself. He has been doing well since his last visit. His breathing has continued to improve. He denies any difficulty with swallowing or chest discomfort. He is trying to stay active.     Past History:  Medical History: has a past medical history of Acromioclavicular separation (Roughly 20 years ago), ADHD (attention deficit hyperactivity disorder), Anemia, Anxiety, Arthritis, Asthma (1/2019), Benign tumor of thymus, Depression, GERD (gastroesophageal reflux disease), History of medical problems, Hypertension, Hypothyroidism, Knee sprain (Over 20 years ago), Knee swelling (Early Dec.), Obesity, Seizures, Stroke, and Visual impairment.   Surgical History: has a past surgical history that includes Thymectomy (N/A, 2/23/2022).   Family History: family history includes Cancer in his father; Depression in his mother; Heart disease in his paternal grandfather; Hypertension in his father and mother; Kidney disease in his father and mother; Mental illness in his mother; Stroke in his paternal grandfather.   Social History: reports that he has never smoked. He has never used smokeless tobacco. He  "reports current alcohol use of about 4.0 standard drinks per week. He reports that he does not use drugs.    (Not in a hospital admission)     Allergies: Patient has no known allergies.     Review of Systems   Constitutional:  Positive for fatigue.   Respiratory:  Positive for shortness of breath.         Improving     PHYSICAL EXAMINATION:   /80   Pulse 63   Temp 96.9 °F (36.1 °C) (Infrared)   Resp 18   Ht 172.7 cm (67.99\")   Wt (!) 156 kg (343 lb)   SpO2 96%   BMI 52.17 kg/m²    Pain Score    09/12/23 1326   PainSc: 0-No pain      ECOG score: 0        ECOG Performance Status: 0 - Asymptomatic  General Appearance:  alert, cooperative, no apparent distress, appears stated age, and obese by BMI criteria   Lungs:   Clear to auscultation bilaterally; respirations regular, even, and unlabored bilaterally   Heart:  Regular rate and rhythm, no murmurs appreciated   Abdomen:   Soft, non-tender, non-distended, and no organomegaly     Hospital Outpatient Visit on 09/05/2023   Component Date Value Ref Range Status    Creatinine 09/05/2023 1.10  0.60 - 1.30 mg/dL Final    Serial Number: [id number]:  [id number]   Lab on 09/05/2023   Component Date Value Ref Range Status    Glucose 09/05/2023 85  65 - 99 mg/dL Final    BUN 09/05/2023 16  6 - 20 mg/dL Final    Creatinine 09/05/2023 1.01  0.76 - 1.27 mg/dL Final    Sodium 09/05/2023 138  136 - 145 mmol/L Final    Potassium 09/05/2023 4.2  3.5 - 5.2 mmol/L Final    Slight hemolysis detected by analyzer. Results may be affected.    Chloride 09/05/2023 101  98 - 107 mmol/L Final    CO2 09/05/2023 26.0  22.0 - 29.0 mmol/L Final    Calcium 09/05/2023 9.3  8.6 - 10.5 mg/dL Final    Total Protein 09/05/2023 7.5  6.0 - 8.5 g/dL Final    Albumin 09/05/2023 4.3  3.5 - 5.2 g/dL Final    ALT (SGPT) 09/05/2023 17  1 - 41 U/L Final    AST (SGOT) 09/05/2023 24  1 - 40 U/L Final    Alkaline Phosphatase 09/05/2023 52  39 - 117 U/L Final    Total Bilirubin 09/05/2023 0.3  0.0 - 1.2 " mg/dL Final    Globulin 09/05/2023 3.2  gm/dL Final    Calculated Result    A/G Ratio 09/05/2023 1.3  g/dL Final    BUN/Creatinine Ratio 09/05/2023 15.8  7.0 - 25.0 Final    Anion Gap 09/05/2023 11.0  5.0 - 15.0 mmol/L Final    eGFR 09/05/2023 95.8  >60.0 mL/min/1.73 Final    TSH 09/05/2023 1.370  0.270 - 4.200 uIU/mL Final    WBC 09/05/2023 2.91 (L)  3.40 - 10.80 10*3/mm3 Final    RBC 09/05/2023 4.27  4.14 - 5.80 10*6/mm3 Final    Hemoglobin 09/05/2023 13.0  13.0 - 17.7 g/dL Final    Hematocrit 09/05/2023 40.1  37.5 - 51.0 % Final    MCV 09/05/2023 93.9  79.0 - 97.0 fL Final    MCH 09/05/2023 30.4  26.6 - 33.0 pg Final    MCHC 09/05/2023 32.4  31.5 - 35.7 g/dL Final    RDW 09/05/2023 12.9  12.3 - 15.4 % Final    RDW-SD 09/05/2023 44.4  37.0 - 54.0 fl Final    MPV 09/05/2023 10.0  6.0 - 12.0 fL Final    Platelets 09/05/2023 270  140 - 450 10*3/mm3 Final    Neutrophil % 09/05/2023 47.8  42.7 - 76.0 % Final    Lymphocyte % 09/05/2023 36.1  19.6 - 45.3 % Final    Monocyte % 09/05/2023 14.1 (H)  5.0 - 12.0 % Final    Eosinophil % 09/05/2023 1.0  0.3 - 6.2 % Final    Basophil % 09/05/2023 0.7  0.0 - 1.5 % Final    Immature Grans % 09/05/2023 0.3  0.0 - 0.5 % Final    Neutrophils, Absolute 09/05/2023 1.39 (L)  1.70 - 7.00 10*3/mm3 Final    Lymphocytes, Absolute 09/05/2023 1.05  0.70 - 3.10 10*3/mm3 Final    Monocytes, Absolute 09/05/2023 0.41  0.10 - 0.90 10*3/mm3 Final    Eosinophils, Absolute 09/05/2023 0.03  0.00 - 0.40 10*3/mm3 Final    Basophils, Absolute 09/05/2023 0.02  0.00 - 0.20 10*3/mm3 Final    Immature Grans, Absolute 09/05/2023 0.01  0.00 - 0.05 10*3/mm3 Final    nRBC 09/05/2023 0.0  0.0 - 0.2 /100 WBC Final        CT Chest With Contrast Diagnostic    Result Date: 9/6/2023  Narrative: CT CHEST W CONTRAST DIAGNOSTIC Date of Exam: 9/5/2023 1:29 PM EDT Indication: f/u s cans. Restaging thymic carcinoma Comparison: Chest CT with contrast dated 3/7/2023 and 9/6/2022 and CT of the chest dated 1/29/2022 Technique:  Axial CT images were obtained of the chest after the uneventful intravenous administration of 75 Isovue-300.  Reconstructed coronal and sagittal images were also obtained. Automated exposure control and iterative construction methods were used. Findings: There are surgical clips and stable postsurgical changes from previous sternotomy and mediastinal mass resection. No residual or recurrent soft tissue in this region is seen. The thyroid gland has a stable appearance with an enlarged right thyroid lobe and multiple nodules, possibly a goiter, this is unchanged. No pathologically enlarged thoracic or axillary lymph nodes. No pleural or pericardial effusions. The central tracheobronchial tree is widely patent. No new or suspicious pulmonary nodules or consolidations. No interstitial thickening or bronchiectasis or abnormal bronchial wall thickening. The thoracic aorta is nonaneurysmal. The pulmonary arteries are normal in caliber. Limited images of the upper abdomen demonstrate no acute or suspicious abnormality. No acute bony abnormality or aggressive appearing focal osseous lesions are seen in the bony thorax. Sternotomy wires are present.     Impression: Impression: Stable CT appearance of the chest without signs of residual, recurrent or metastatic disease. Electronically Signed: Adin Shaver DO  9/6/2023 12:56 PM EDT  Workstation ID: [id number]       ASSESSMENT: The patient is a very pleasant 43 y.o. male  with thymoma      PLAN:    1.  Thymoma:  A.  I reviewed the CAT scan results from 9/5/2023 with the patient. I reviewed the images myself. I reassured him he has stable findings without evidence of new or progressive disease.   B.  I reviewed the lab results from 9/5/2023. He continues to have mild but stable leukopenia with WBC of 2,910 with normal hemoglobin and platelet count. His creatinine is normal as well as liver enzymes. TSH was normal at 1.370.   C. We will see the patient back in 6 months with repeat  labs and scans prior. If he still has stable findings without evidence of recurrence, we will switch him to annual visits at that time.     2.  Thyroid goiter:  A.  TSH was normal on recent labs.   B.  We will continue to monitor his TSH with each visit.   C. If he has enlarging goiter or evidence of thyroid dysfunction we will consider sending him to endocrinology for further work up.     3.  Morbid obesity:  A.  BMI 52.  B.  I encouraged him to continue efforts towards weight loss and exercise.  He is aware that obesity is a risk factor for cancer.   C. He did have fatty liver changes on his last CT scan, however his liver enzymes continue to be normal.     4.  Seizure disorder:  A.  He will continue follow-up with neurology regarding management of seizure disorder.  B. He is currently on Xcorpi and Trileptal for management.     FOLLOW UP: 6 months CT chest.    Yanet Orr, APRN  9/12/2023

## 2024-01-27 DIAGNOSIS — I10 PRIMARY HYPERTENSION: ICD-10-CM

## 2024-01-29 RX ORDER — LOSARTAN POTASSIUM AND HYDROCHLOROTHIAZIDE 12.5; 5 MG/1; MG/1
1 TABLET ORAL DAILY
Qty: 90 TABLET | Refills: 1 | Status: SHIPPED | OUTPATIENT
Start: 2024-01-29

## 2024-07-11 DIAGNOSIS — I10 PRIMARY HYPERTENSION: ICD-10-CM

## 2024-07-11 RX ORDER — LOSARTAN POTASSIUM AND HYDROCHLOROTHIAZIDE 12.5; 5 MG/1; MG/1
1 TABLET ORAL DAILY
Qty: 90 TABLET | Refills: 0 | Status: SHIPPED | OUTPATIENT
Start: 2024-07-11

## 2024-07-11 NOTE — TELEPHONE ENCOUNTER
Rx Refill Note  Requested Prescriptions     Pending Prescriptions Disp Refills    losartan-hydrochlorothiazide (HYZAAR) 50-12.5 MG per tablet [Pharmacy Med Name: LOSARTAN-HCTZ 50-12.5 MG TAB] 90 tablet 1     Sig: TAKE 1 TABLET BY MOUTH EVERY DAY      Last office visit with prescribing clinician: 4/11/2023   Last telemedicine visit with prescribing clinician: Visit date not found   Next office visit with prescribing clinician: Visit date not found                         Would you like a call back once the refill request has been completed: [] Yes [] No    If the office needs to give you a call back, can they leave a voicemail: [] Yes [] No    Denisha Woodward MA  07/11/24, 08:49 EDT

## 2024-10-14 DIAGNOSIS — I10 PRIMARY HYPERTENSION: ICD-10-CM

## 2024-10-15 RX ORDER — LOSARTAN POTASSIUM AND HYDROCHLOROTHIAZIDE 12.5; 5 MG/1; MG/1
1 TABLET ORAL DAILY
Qty: 90 TABLET | Refills: 0 | Status: SHIPPED | OUTPATIENT
Start: 2024-10-15

## 2024-10-15 NOTE — TELEPHONE ENCOUNTER
Rx Refill Note  Requested Prescriptions     Pending Prescriptions Disp Refills    losartan-hydrochlorothiazide (HYZAAR) 50-12.5 MG per tablet [Pharmacy Med Name: LOSARTAN-HCTZ 50-12.5 MG TAB] 90 tablet 0     Sig: TAKE 1 TABLET BY MOUTH EVERY DAY      Last office visit with prescribing clinician: 4/11/2023   Last telemedicine visit with prescribing clinician: Visit date not found   Next office visit with prescribing clinician: Visit date not found                         Would you like a call back once the refill request has been completed: [] Yes [] No    If the office needs to give you a call back, can they leave a voicemail: [] Yes [] No    Denisha Woodward MA  10/15/24, 12:24 EDT

## 2025-01-13 DIAGNOSIS — I10 PRIMARY HYPERTENSION: ICD-10-CM

## 2025-01-13 RX ORDER — LOSARTAN POTASSIUM AND HYDROCHLOROTHIAZIDE 12.5; 5 MG/1; MG/1
1 TABLET ORAL DAILY
Qty: 90 TABLET | Refills: 0 | Status: SHIPPED | OUTPATIENT
Start: 2025-01-13

## 2025-01-13 NOTE — TELEPHONE ENCOUNTER
Rx Refill Note  Requested Prescriptions     Pending Prescriptions Disp Refills    losartan-hydrochlorothiazide (HYZAAR) 50-12.5 MG per tablet [Pharmacy Med Name: LOSARTAN-HCTZ 50-12.5 MG TAB] 90 tablet 0     Sig: TAKE 1 TABLET BY MOUTH EVERY DAY      Last office visit with prescribing clinician: 4/11/2023   Last telemedicine visit with prescribing clinician: Visit date not found   Next office visit with prescribing clinician: Visit date not found                         Would you like a call back once the refill request has been completed: [] Yes [] No    If the office needs to give you a call back, can they leave a voicemail: [] Yes [] No    Darlyn Lawrence MA  01/13/25, 13:29 EST

## 2025-06-26 ENCOUNTER — LAB (OUTPATIENT)
Dept: LAB | Facility: HOSPITAL | Age: 45
End: 2025-06-26
Payer: COMMERCIAL

## 2025-06-26 ENCOUNTER — OFFICE VISIT (OUTPATIENT)
Dept: FAMILY MEDICINE CLINIC | Facility: CLINIC | Age: 45
End: 2025-06-26
Payer: COMMERCIAL

## 2025-06-26 VITALS
SYSTOLIC BLOOD PRESSURE: 162 MMHG | HEART RATE: 71 BPM | HEIGHT: 68 IN | OXYGEN SATURATION: 97 % | DIASTOLIC BLOOD PRESSURE: 98 MMHG | BODY MASS INDEX: 47.74 KG/M2 | WEIGHT: 315 LBS

## 2025-06-26 DIAGNOSIS — Z00.00 PREVENTATIVE HEALTH CARE: ICD-10-CM

## 2025-06-26 DIAGNOSIS — R06.2 WHEEZING: ICD-10-CM

## 2025-06-26 DIAGNOSIS — I10 PRIMARY HYPERTENSION: ICD-10-CM

## 2025-06-26 DIAGNOSIS — R51.9 NONINTRACTABLE HEADACHE, UNSPECIFIED CHRONICITY PATTERN, UNSPECIFIED HEADACHE TYPE: Primary | ICD-10-CM

## 2025-06-26 DIAGNOSIS — I10 HYPERTENSION, UNSPECIFIED TYPE: ICD-10-CM

## 2025-06-26 LAB
25(OH)D3 SERPL-MCNC: 36.9 NG/ML (ref 30–100)
ALBUMIN SERPL-MCNC: 4.6 G/DL (ref 3.5–5.2)
ALBUMIN/GLOB SERPL: 1.4 G/DL
ALP SERPL-CCNC: 48 U/L (ref 39–117)
ALT SERPL W P-5'-P-CCNC: 24 U/L (ref 1–41)
ANION GAP SERPL CALCULATED.3IONS-SCNC: 10 MMOL/L (ref 5–15)
AST SERPL-CCNC: 31 U/L (ref 1–40)
BASOPHILS # BLD AUTO: 0.03 10*3/MM3 (ref 0–0.2)
BASOPHILS NFR BLD AUTO: 1.2 % (ref 0–1.5)
BILIRUB SERPL-MCNC: 0.5 MG/DL (ref 0–1.2)
BUN SERPL-MCNC: 10 MG/DL (ref 6–20)
BUN/CREAT SERPL: 9.2 (ref 7–25)
CALCIUM SPEC-SCNC: 10.1 MG/DL (ref 8.6–10.5)
CHLORIDE SERPL-SCNC: 103 MMOL/L (ref 98–107)
CHOLEST SERPL-MCNC: 161 MG/DL (ref 0–200)
CO2 SERPL-SCNC: 25 MMOL/L (ref 22–29)
CREAT SERPL-MCNC: 1.09 MG/DL (ref 0.76–1.27)
DEPRECATED RDW RBC AUTO: 44 FL (ref 37–54)
EGFRCR SERPLBLD CKD-EPI 2021: 86.4 ML/MIN/1.73
EOSINOPHIL # BLD AUTO: 0.03 10*3/MM3 (ref 0–0.4)
EOSINOPHIL NFR BLD AUTO: 1.2 % (ref 0.3–6.2)
ERYTHROCYTE [DISTWIDTH] IN BLOOD BY AUTOMATED COUNT: 12.6 % (ref 12.3–15.4)
GLOBULIN UR ELPH-MCNC: 3.2 GM/DL
GLUCOSE SERPL-MCNC: 94 MG/DL (ref 65–99)
HBA1C MFR BLD: 5.2 % (ref 4.8–5.6)
HCT VFR BLD AUTO: 43.3 % (ref 37.5–51)
HDLC SERPL-MCNC: 80 MG/DL (ref 40–60)
HGB BLD-MCNC: 13.9 G/DL (ref 13–17.7)
IMM GRANULOCYTES # BLD AUTO: 0 10*3/MM3 (ref 0–0.05)
IMM GRANULOCYTES NFR BLD AUTO: 0 % (ref 0–0.5)
LDLC SERPL CALC-MCNC: 74 MG/DL (ref 0–100)
LDLC/HDLC SERPL: 0.95 {RATIO}
LYMPHOCYTES # BLD AUTO: 0.72 10*3/MM3 (ref 0.7–3.1)
LYMPHOCYTES NFR BLD AUTO: 29.3 % (ref 19.6–45.3)
MCH RBC QN AUTO: 30.4 PG (ref 26.6–33)
MCHC RBC AUTO-ENTMCNC: 32.1 G/DL (ref 31.5–35.7)
MCV RBC AUTO: 94.7 FL (ref 79–97)
MONOCYTES # BLD AUTO: 0.32 10*3/MM3 (ref 0.1–0.9)
MONOCYTES NFR BLD AUTO: 13 % (ref 5–12)
NEUTROPHILS NFR BLD AUTO: 1.36 10*3/MM3 (ref 1.7–7)
NEUTROPHILS NFR BLD AUTO: 55.3 % (ref 42.7–76)
PLATELET # BLD AUTO: 273 10*3/MM3 (ref 140–450)
PMV BLD AUTO: 10.4 FL (ref 6–12)
POTASSIUM SERPL-SCNC: 4.8 MMOL/L (ref 3.5–5.2)
PROT SERPL-MCNC: 7.8 G/DL (ref 6–8.5)
RBC # BLD AUTO: 4.57 10*6/MM3 (ref 4.14–5.8)
SODIUM SERPL-SCNC: 138 MMOL/L (ref 136–145)
T4 FREE SERPL-MCNC: 1.08 NG/DL (ref 0.92–1.68)
TRIGL SERPL-MCNC: 25 MG/DL (ref 0–150)
TSH SERPL DL<=0.05 MIU/L-ACNC: 1.62 UIU/ML (ref 0.27–4.2)
VLDLC SERPL-MCNC: 7 MG/DL (ref 5–40)
WBC NRBC COR # BLD AUTO: 2.46 10*3/MM3 (ref 3.4–10.8)

## 2025-06-26 PROCEDURE — 80061 LIPID PANEL: CPT

## 2025-06-26 PROCEDURE — 83036 HEMOGLOBIN GLYCOSYLATED A1C: CPT

## 2025-06-26 PROCEDURE — 82306 VITAMIN D 25 HYDROXY: CPT

## 2025-06-26 PROCEDURE — 99214 OFFICE O/P EST MOD 30 MIN: CPT | Performed by: INTERNAL MEDICINE

## 2025-06-26 PROCEDURE — 84443 ASSAY THYROID STIM HORMONE: CPT

## 2025-06-26 PROCEDURE — 81003 URINALYSIS AUTO W/O SCOPE: CPT

## 2025-06-26 PROCEDURE — 80053 COMPREHEN METABOLIC PANEL: CPT

## 2025-06-26 PROCEDURE — 85025 COMPLETE CBC W/AUTO DIFF WBC: CPT

## 2025-06-26 PROCEDURE — 84439 ASSAY OF FREE THYROXINE: CPT

## 2025-06-26 RX ORDER — LAMOTRIGINE 100 MG/1
TABLET ORAL
COMMUNITY
Start: 2024-12-06

## 2025-06-26 RX ORDER — HYDROCHLOROTHIAZIDE 25 MG/1
25 TABLET ORAL DAILY
Qty: 30 TABLET | Refills: 5 | Status: SHIPPED | OUTPATIENT
Start: 2025-06-26

## 2025-06-26 RX ORDER — AMLODIPINE BESYLATE 5 MG/1
5 TABLET ORAL DAILY
Qty: 30 TABLET | Refills: 2 | Status: SHIPPED | OUTPATIENT
Start: 2025-06-26

## 2025-06-27 DIAGNOSIS — I10 HYPERTENSION, UNSPECIFIED TYPE: ICD-10-CM

## 2025-06-27 LAB
APPEARANCE UR: CLEAR
BACTERIA #/AREA URNS HPF: NORMAL /[HPF]
BILIRUB UR QL STRIP: NEGATIVE
CASTS URNS QL MICRO: NORMAL /LPF
COLOR UR: YELLOW
EPI CELLS #/AREA URNS HPF: NORMAL /HPF (ref 0–10)
GLUCOSE UR QL STRIP: NEGATIVE
HGB UR QL STRIP: NEGATIVE
KETONES UR QL STRIP: NEGATIVE
LEUKOCYTE ESTERASE UR QL STRIP: NEGATIVE
MICRO URNS: NORMAL
MICRO URNS: NORMAL
NITRITE UR QL STRIP: NEGATIVE
PH UR STRIP: 7 [PH] (ref 5–7.5)
PROT UR QL STRIP: NORMAL
RBC #/AREA URNS HPF: NORMAL /HPF (ref 0–2)
SP GR UR STRIP: 1.03 (ref 1–1.03)
URINALYSIS REFLEX: NORMAL
UROBILINOGEN UR STRIP-MCNC: 0.2 MG/DL (ref 0.2–1)
WBC #/AREA URNS HPF: NORMAL /HPF (ref 0–5)

## 2025-06-27 RX ORDER — AMLODIPINE BESYLATE 5 MG/1
5 TABLET ORAL DAILY
Qty: 90 TABLET | OUTPATIENT
Start: 2025-06-27

## 2025-06-30 NOTE — PROGRESS NOTES
Chief Complaint   Patient presents with    Headache     Headaches on and off for a few weeks, medication not helping. Some blurry vision, aura.        HPI:  Jose Hardin is a 45 y.o. male who presents today for headaches and hypertension.  Stopped taking blood pressure medication.    ROS:  Constitutional: no fevers, night sweats or unexplained weight loss  Eyes: no vision changes  ENT: no runny nose, ear pain, sore throat  Cardio: no chest pain, palpitations  Pulm: no shortness of breath, wheezing, or cough  GI: no abdominal pain or changes in bowel movements  : no difficulty urinating  MSK: no difficulty ambulating, no joint pain  Neuro: no weakness, dizziness or headache  Psych: no trouble sleeping  Endo: no change in appetite      Past Medical History:   Diagnosis Date    Acromioclavicular separation Roughly 20 years ago    High school football injury    ADHD (attention deficit hyperactivity disorder)     Anemia     Slight red blood count    Anxiety     Arthritis     Asthma 1/2019    cpap breathing machine    Benign tumor of thymus     Depression     GERD (gastroesophageal reflux disease)     Acid reflux    History of medical problems     Psuedotumor cerebri/Intracranial hypertension    Hypertension     Hypothyroidism     Slightly swollen thyroid, goiter    Knee sprain Over 20 years ago    Old injury    Knee swelling Early Dec.    Recent X-Ray for this visit    Obesity     Seizures     having seizures once every two weeks x 2 years    Stroke     Mini-stroke/TIA    TIA (transient ischemic attack)     Diagnosed with mini-stroke several years ago (roughly 2018). Could have been epilepsy condition being misdiagnosed.    Visual impairment       Family History   Problem Relation Age of Onset    Hypertension Mother     Mental illness Mother     Kidney disease Mother     Depression Mother     Sudden death Mother         COVID-19: 11/11/2021    Hypertension Father     Kidney disease Father     Cancer  Father     Heart disease Paternal Grandfather     Stroke Paternal Grandfather     Sudden death Paternal Grandfather         Heart attack      Social History     Socioeconomic History    Marital status: Single    Number of children: 0   Tobacco Use    Smoking status: Never     Passive exposure: Never    Smokeless tobacco: Never    Tobacco comments:     No history or use.   Vaping Use    Vaping status: Some Days    Substances: CBD    Devices: Pre-filled or refillable cartridge   Substance and Sexual Activity    Alcohol use: Not Currently     Alcohol/week: 4.0 standard drinks of alcohol     Types: 2 Cans of beer, 2 Drinks containing 0.5 oz of alcohol per week    Drug use: Never    Sexual activity: Yes     Partners: Female     Birth control/protection: I.U.D.      No Known Allergies   Immunization History   Administered Date(s) Administered    COVID-19 (PFIZER) Purple Cap Monovalent 03/25/2021, 04/16/2021    Fluzone (or Fluarix & Flulaval for VFC) >6mos 01/05/2021, 01/31/2022, 01/10/2023    Influenza, Unspecified 01/05/2021, 01/31/2022    PPD Test 10/26/2001    Tdap 09/02/2020        PE:  Vitals:    06/26/25 1033   BP: 162/98   Pulse: 71   SpO2: 97%      Body mass index is 54.45 kg/m².    Gen Appearance: NAD  HEENT: Normocephalic, PERRLA, no thyromegaly, trache midline  Heart: RRR, normal S1 and S2, no murmur  Lungs: CTA b/l, no wheezing, no crackles  Abdomen: Soft, non-tender, non-distended, no guarding and BSx4  MSK: Moves all extremities well, normal gait, no peripheral edema  Pulses: Palpable and equal b/l  Lymph nodes: No palpable lymphadenopathy   Neuro: No focal deficits      Current Outpatient Medications   Medication Sig Dispense Refill    lamoTRIgine (LaMICtal) 100 MG tablet       amLODIPine (NORVASC) 5 MG tablet Take 1 tablet by mouth Daily. 30 tablet 2    hydroCHLOROthiazide 25 MG tablet Take 1 tablet by mouth Daily. 30 tablet 5     No current facility-administered medications for this visit.      Recommend  resuming prior blood pressure medication.  Continue HCTZ 25 mg, reduce amlodipine to 5 mg.    Recheck blood pressure in 2 to 4 weeks.  Blood work prior.    Samples of Nurtec given to patient for headaches.    Diagnoses and all orders for this visit:    1. Nonintractable headache, unspecified chronicity pattern, unspecified headache type (Primary)    2. Hypertension, unspecified type  -     amLODIPine (NORVASC) 5 MG tablet; Take 1 tablet by mouth Daily.  Dispense: 30 tablet; Refill: 2    3. Primary hypertension  -     hydroCHLOROthiazide 25 MG tablet; Take 1 tablet by mouth Daily.  Dispense: 30 tablet; Refill: 5    4. Wheezing  -     XR Chest PA & Lateral; Future    5. Preventative health care  -     CBC & Differential; Future  -     Hemoglobin A1c; Future  -     Comprehensive Metabolic Panel; Future  -     Lipid Panel; Future  -     TSH+Free T4; Future  -     Vitamin D,25-Hydroxy; Future  -     UA / M With / Rflx Culture([facility] ONLY) - Urine, Clean Catch; Future         Return in about 2 weeks (around 7/10/2025) for Annual physical.     Dictated Utilizing Dragon Dictation    Please note that portions of this note were completed with a voice recognition program.    Part of this note may be an electronic transcription/translation of spoken language to printed text using the Dragon Dictation System.

## 2025-07-11 ENCOUNTER — OFFICE VISIT (OUTPATIENT)
Dept: FAMILY MEDICINE CLINIC | Facility: CLINIC | Age: 45
End: 2025-07-11
Payer: COMMERCIAL

## 2025-07-11 ENCOUNTER — TELEPHONE (OUTPATIENT)
Dept: ONCOLOGY | Facility: CLINIC | Age: 45
End: 2025-07-11
Payer: COMMERCIAL

## 2025-07-11 ENCOUNTER — TELEPHONE (OUTPATIENT)
Dept: ONCOLOGY | Facility: CLINIC | Age: 45
End: 2025-07-11

## 2025-07-11 VITALS
HEART RATE: 67 BPM | SYSTOLIC BLOOD PRESSURE: 146 MMHG | WEIGHT: 315 LBS | BODY MASS INDEX: 47.74 KG/M2 | OXYGEN SATURATION: 97 % | HEIGHT: 68 IN | DIASTOLIC BLOOD PRESSURE: 90 MMHG

## 2025-07-11 DIAGNOSIS — B49 FUNGAL INFECTION: ICD-10-CM

## 2025-07-11 DIAGNOSIS — Z00.00 PREVENTATIVE HEALTH CARE: Primary | ICD-10-CM

## 2025-07-11 DIAGNOSIS — G47.19 EXCESSIVE DAYTIME SLEEPINESS: ICD-10-CM

## 2025-07-11 DIAGNOSIS — I10 HYPERTENSION, UNSPECIFIED TYPE: ICD-10-CM

## 2025-07-11 DIAGNOSIS — C37 THYMIC CARCINOMA: Primary | ICD-10-CM

## 2025-07-11 DIAGNOSIS — G47.33 OBSTRUCTIVE SLEEP APNEA: ICD-10-CM

## 2025-07-11 DIAGNOSIS — Z87.898 HISTORY OF SEIZURES: Chronic | ICD-10-CM

## 2025-07-11 PROCEDURE — 99396 PREV VISIT EST AGE 40-64: CPT | Performed by: INTERNAL MEDICINE

## 2025-07-11 RX ORDER — LOSARTAN POTASSIUM 50 MG/1
50 TABLET ORAL DAILY
Qty: 30 TABLET | Refills: 2 | Status: SHIPPED | OUTPATIENT
Start: 2025-07-11

## 2025-07-11 RX ORDER — FLUCONAZOLE 100 MG/1
100 TABLET ORAL DAILY
Qty: 7 TABLET | Refills: 0 | Status: SHIPPED | OUTPATIENT
Start: 2025-07-11 | End: 2025-07-18

## 2025-07-11 NOTE — TELEPHONE ENCOUNTER
RETURNED CALL TO PATIENT TO SCHEDULE AND WAS UNABLE TO REACH PATIENT. LEFT VOICEMAIL FOR PATIENT TO RETURN CALL REGARDING SCHEDULING. PLEASE ADVISE.

## 2025-07-11 NOTE — TELEPHONE ENCOUNTER
Caller: Jose Hardin    Relationship to patient: Self    Best call back number:   Telephone Information:   Mobile [phone]     Chief complaint: TOLD TO F/U WITH DR. EDWARDS BY PCP    Type of visit: F/U 1     Requested date: TUES, WED OR FRI, CALL TO R/S     If rescheduling, when is the original appointment: 3/12/2024      CLINICAL:   PATIENT IS WANTING TO SCHEDULE A CT SCAN, PLEASE PUT IN ORDER SO HE CAN SCHEDULE APPT.    CALL BACK  NUMBER [phone]

## 2025-07-11 NOTE — PROGRESS NOTES
Chief Complaint   Patient presents with    Annual Exam       HPI:  Jose Hardin is a 45 y.o. male who presents today for annual exam.    ROS:  Constitutional: no fevers, night sweats or unexplained weight loss  Eyes: no vision changes  ENT: no runny nose, ear pain, sore throat  Cardio: no chest pain, palpitations  Pulm: no shortness of breath, wheezing, or cough  GI: no abdominal pain or changes in bowel movements  : no difficulty urinating  MSK: no difficulty ambulating, no joint pain  Neuro: no weakness, dizziness or headache  Psych: no trouble sleeping  Endo: no change in appetite      Past Medical History:   Diagnosis Date    Acromioclavicular separation Roughly 20 years ago    High school football injury    ADHD (attention deficit hyperactivity disorder)     Anemia     Slight red blood count    Anxiety     Arthritis     Asthma 1/2019    cpap breathing machine    Benign tumor of thymus     Depression     GERD (gastroesophageal reflux disease)     Acid reflux    History of medical problems     Psuedotumor cerebri/Intracranial hypertension    Hypertension     Hypothyroidism     Slightly swollen thyroid, goiter    Knee sprain Over 20 years ago    Old injury    Knee swelling Early Dec.    Recent X-Ray for this visit    Obesity     Seizures     having seizures once every two weeks x 2 years    Stroke     Mini-stroke/TIA    TIA (transient ischemic attack)     Diagnosed with mini-stroke several years ago (roughly 2018). Could have been epilepsy condition being misdiagnosed.    Visual impairment       Family History   Problem Relation Age of Onset    Hypertension Mother     Mental illness Mother     Kidney disease Mother     Depression Mother     Sudden death Mother         COVID-19: 11/11/2021    Hypertension Father     Kidney disease Father     Cancer Father     Heart disease Paternal Grandfather     Stroke Paternal Grandfather     Sudden death Paternal Grandfather         Heart attack      Social  History     Socioeconomic History    Marital status: Single    Number of children: 0   Tobacco Use    Smoking status: Never     Passive exposure: Never    Smokeless tobacco: Never    Tobacco comments:     No history or use.   Vaping Use    Vaping status: Some Days    Substances: CBD    Devices: Pre-filled or refillable cartridge   Substance and Sexual Activity    Alcohol use: Yes     Alcohol/week: 4.0 standard drinks of alcohol     Types: 2 Cans of beer, 2 Drinks containing 0.5 oz of alcohol per week    Drug use: Never    Sexual activity: Yes     Partners: Female     Birth control/protection: I.U.D.      No Known Allergies   Immunization History   Administered Date(s) Administered    COVID-19 (PFIZER) Purple Cap Monovalent 03/25/2021, 04/16/2021    Fluzone (or Fluarix & Flulaval for VFC) >6mos 01/05/2021, 01/31/2022, 01/10/2023    Influenza, Unspecified 01/05/2021, 01/31/2022    PPD Test 10/26/2001    Tdap 09/02/2020        PE:  Vitals:    07/11/25 1008   BP: 146/90   Pulse: 67   SpO2: 97%      Body mass index is 54.51 kg/m².    Gen Appearance: NAD  HEENT: Normocephalic, PERRLA, no thyromegaly, trache midline  Heart: RRR, normal S1 and S2, no murmur  Lungs: CTA b/l, no wheezing, no crackles  Abdomen: Soft, non-tender, non-distended, no guarding and BSx4  MSK: Moves all extremities well, normal gait, no peripheral edema  Pulses: Palpable and equal b/l  Lymph nodes: No palpable lymphadenopathy   Neuro: No focal deficits      Current Outpatient Medications   Medication Sig Dispense Refill    amLODIPine (NORVASC) 5 MG tablet Take 1 tablet by mouth Daily. 30 tablet 2    hydroCHLOROthiazide 25 MG tablet Take 1 tablet by mouth Daily. 30 tablet 5    lamoTRIgine (LaMICtal) 100 MG tablet       fluconazole (Diflucan) 100 MG tablet Take 1 tablet by mouth Daily for 7 days. 7 tablet 0    losartan (Cozaar) 50 MG tablet Take 1 tablet by mouth Daily. 30 tablet 2     No current facility-administered medications for this visit.         Diagnoses and all orders for this visit:    1. Preventative health care (Primary)  Counseled on healthy weight, nutrition, physical activity, cancer screening, and immunizations.    2. Hypertension, unspecified type  -     losartan (Cozaar) 50 MG tablet; Take 1 tablet by mouth Daily.  Dispense: 30 tablet; Refill: 2  Controlled blood pressure, add on losartan 50 mg, follow-up in 4 weeks.  3. History of seizures    4. Fungal infection  -     fluconazole (Diflucan) 100 MG tablet; Take 1 tablet by mouth Daily for 7 days.  Dispense: 7 tablet; Refill: 0    5. Obstructive sleep apnea  History of obstructive sleep apnea, currently not wearing CPAP.  Complaining of morning headaches, excessive daytime sleepiness.  Recommend rechecking home sleep study.  6. Excessive daytime sleepiness         Return in about 4 weeks (around 8/8/2025) for htn.     Dictated Utilizing Dragon Dictation    Please note that portions of this note were completed with a voice recognition program.    Part of this note may be an electronic transcription/translation of spoken language to printed text using the Dragon Dictation System.

## 2025-07-17 ENCOUNTER — HOSPITAL ENCOUNTER (OUTPATIENT)
Dept: SLEEP MEDICINE | Facility: HOSPITAL | Age: 45
Discharge: HOME | End: 2025-07-17
Admitting: INTERNAL MEDICINE
Payer: COMMERCIAL

## 2025-07-17 VITALS — HEIGHT: 68 IN | WEIGHT: 315 LBS | BODY MASS INDEX: 47.74 KG/M2

## 2025-07-17 DIAGNOSIS — G47.33 OBSTRUCTIVE SLEEP APNEA: ICD-10-CM

## 2025-07-17 DIAGNOSIS — G47.19 EXCESSIVE DAYTIME SLEEPINESS: ICD-10-CM

## 2025-07-17 PROCEDURE — 95800 SLP STDY UNATTENDED: CPT

## 2025-07-30 DIAGNOSIS — G47.33 SEVERE OBSTRUCTIVE SLEEP APNEA: Primary | ICD-10-CM

## 2025-08-01 ENCOUNTER — HOSPITAL ENCOUNTER (OUTPATIENT)
Dept: CT IMAGING | Facility: HOSPITAL | Age: 45
Discharge: HOME | End: 2025-08-01
Admitting: INTERNAL MEDICINE
Payer: COMMERCIAL

## 2025-08-01 DIAGNOSIS — C37 THYMIC CARCINOMA: ICD-10-CM

## 2025-08-01 PROCEDURE — 71260 CT THORAX DX C+: CPT

## 2025-08-01 PROCEDURE — 25510000001 IOPAMIDOL 61 % SOLUTION: Performed by: INTERNAL MEDICINE

## 2025-08-01 RX ORDER — IOPAMIDOL 612 MG/ML
100 INJECTION, SOLUTION INTRAVASCULAR
Status: COMPLETED | OUTPATIENT
Start: 2025-08-01 | End: 2025-08-01

## 2025-08-01 RX ADMIN — IOPAMIDOL 85 ML: 612 INJECTION, SOLUTION INTRAVENOUS at 16:14

## 2025-08-08 ENCOUNTER — OFFICE VISIT (OUTPATIENT)
Dept: FAMILY MEDICINE CLINIC | Facility: CLINIC | Age: 45
End: 2025-08-08
Payer: COMMERCIAL

## 2025-08-08 VITALS
DIASTOLIC BLOOD PRESSURE: 88 MMHG | HEART RATE: 67 BPM | OXYGEN SATURATION: 96 % | WEIGHT: 315 LBS | BODY MASS INDEX: 47.74 KG/M2 | SYSTOLIC BLOOD PRESSURE: 138 MMHG | HEIGHT: 68 IN

## 2025-08-08 DIAGNOSIS — F32.A ANXIETY AND DEPRESSION: ICD-10-CM

## 2025-08-08 DIAGNOSIS — I10 HYPERTENSION, UNSPECIFIED TYPE: Primary | ICD-10-CM

## 2025-08-08 DIAGNOSIS — G47.33 OBSTRUCTIVE SLEEP APNEA SYNDROME IN ADULT: ICD-10-CM

## 2025-08-08 DIAGNOSIS — E04.1 THYROID NODULE: ICD-10-CM

## 2025-08-08 DIAGNOSIS — R41.840 ATTENTION DEFICIT: ICD-10-CM

## 2025-08-08 DIAGNOSIS — F41.9 ANXIETY AND DEPRESSION: ICD-10-CM

## 2025-08-08 RX ORDER — VENLAFAXINE HYDROCHLORIDE 75 MG/1
75 CAPSULE, EXTENDED RELEASE ORAL DAILY
Qty: 30 CAPSULE | Refills: 2 | Status: SHIPPED | OUTPATIENT
Start: 2025-08-08

## 2025-08-08 RX ORDER — VENLAFAXINE HYDROCHLORIDE 37.5 MG/1
CAPSULE, EXTENDED RELEASE ORAL
Qty: 7 CAPSULE | Refills: 0 | Status: SHIPPED | OUTPATIENT
Start: 2025-08-08

## 2025-08-12 ENCOUNTER — OFFICE VISIT (OUTPATIENT)
Dept: ONCOLOGY | Facility: CLINIC | Age: 45
End: 2025-08-12
Payer: COMMERCIAL

## 2025-08-12 VITALS
HEART RATE: 89 BPM | TEMPERATURE: 97.3 F | BODY MASS INDEX: 47.74 KG/M2 | RESPIRATION RATE: 20 BRPM | HEIGHT: 68 IN | OXYGEN SATURATION: 97 % | DIASTOLIC BLOOD PRESSURE: 87 MMHG | SYSTOLIC BLOOD PRESSURE: 144 MMHG | WEIGHT: 315 LBS

## 2025-08-12 DIAGNOSIS — C37 THYMIC CARCINOMA: Primary | ICD-10-CM

## 2025-08-12 PROCEDURE — 99214 OFFICE O/P EST MOD 30 MIN: CPT | Performed by: INTERNAL MEDICINE

## 2025-08-21 ENCOUNTER — OFFICE VISIT (OUTPATIENT)
Age: 45
End: 2025-08-21
Payer: COMMERCIAL

## 2025-08-21 VITALS
DIASTOLIC BLOOD PRESSURE: 82 MMHG | HEIGHT: 68 IN | WEIGHT: 315 LBS | BODY MASS INDEX: 47.74 KG/M2 | OXYGEN SATURATION: 98 % | HEART RATE: 68 BPM | SYSTOLIC BLOOD PRESSURE: 132 MMHG

## 2025-08-21 DIAGNOSIS — E04.2 NONTOXIC MULTINODULAR GOITER: Primary | ICD-10-CM

## (undated) DEVICE — PAD MINOR UNIVERSAL: Brand: MEDLINE INDUSTRIES, INC.

## (undated) DEVICE — PENCL ROCKRSWCH MEGADYNE W/HOLSTR 10FT SS

## (undated) DEVICE — TBG PENCL TELESCP MEGADYNE SMOKE EVAC 10FT

## (undated) DEVICE — BNDR ABD PREMIUM/UNIV 10IN 27TO48IN

## (undated) DEVICE — 3M™ TEGADERM™ CHG DRESSING 25/CARTON 4 CARTONS/CASE 1658: Brand: TEGADERM™

## (undated) DEVICE — BLAKE CARDIO CONNECTOR 1:1: Brand: J-VAC

## (undated) DEVICE — ANTIBACTERIAL UNDYED BRAIDED (POLYGLACTIN 910), SYNTHETIC ABSORBABLE SUTURE: Brand: COATED VICRYL

## (undated) DEVICE — OASIS DRAIN, SINGLE, INLINE & ATS COMPATIBLE: Brand: OASIS

## (undated) DEVICE — GAUZE,SPONGE,4"X4",16PLY,XRAY,STRL,LF: Brand: MEDLINE

## (undated) DEVICE — SUT VIC 2 TP1 54IN J880T

## (undated) DEVICE — APPL CHLORAPREP TINTED 26ML TEAL

## (undated) DEVICE — STERNUM BLADE, OFFSET (31.7 X 0.64 X 6.3MM)

## (undated) DEVICE — SUT SILK 0 CT1 18IN 424H

## (undated) DEVICE — BLAKE SILICONE DRAINS CARDIO CONNECTOR 2:1: Brand: BLAKE

## (undated) DEVICE — SUT PROLN 3/0 SH D/A 36IN 8522H

## (undated) DEVICE — INTENDED FOR TISSUE SEPARATION, AND OTHER PROCEDURES THAT REQUIRE A SHARP SURGICAL BLADE TO PUNCTURE OR CUT.: Brand: BARD-PARKER ® STAINLESS STEEL BLADES

## (undated) DEVICE — PATIENT RETURN ELECTRODE, SINGLE-USE, CONTACT QUALITY MONITORING, ADULT, WITH 9FT CORD, FOR PATIENTS WEIGING OVER 33LBS. (15KG): Brand: MEGADYNE

## (undated) DEVICE — SPNG GZ STRL 2S 4X4 12PLY

## (undated) DEVICE — DRN WND CH RND FUL/FLUT NO/TROC 3/8IN 28F

## (undated) DEVICE — SUT MNCRYL PLS ANTIB UD 4/0 PS2 18IN

## (undated) DEVICE — SUT PROLN CARDIO V5 3/0 36IN 8936H

## (undated) DEVICE — TRAP FLD MINIVAC MEGADYNE 100ML

## (undated) DEVICE — SUT PROLN 3/0 V7 D/A 36IN 8976H

## (undated) DEVICE — ADHS SKIN PREMIERPRO EXOFIN TOPICAL HI/VISC .5ML

## (undated) DEVICE — KITTNER SPONGE: Brand: DEROYAL

## (undated) DEVICE — GLV SURG BIOGELULTRATOUCH POLYISPRN PF LF SZ7 STRL

## (undated) DEVICE — SUT ETHIB 1 CT1 30IN  X425H

## (undated) DEVICE — SHROD PENCL MEGADYNE ATTACHAVAC W/CONN/22MM

## (undated) DEVICE — 3M™ MEDIPORE™ H SOFT CLOTH SURGICAL TAPE, 2863, 3 IN X 10 YD, 12/CASE: Brand: 3M™ MEDIPORE™

## (undated) DEVICE — PK MINOR SPLT 10